# Patient Record
Sex: FEMALE | Race: WHITE | NOT HISPANIC OR LATINO | Employment: OTHER | URBAN - METROPOLITAN AREA
[De-identification: names, ages, dates, MRNs, and addresses within clinical notes are randomized per-mention and may not be internally consistent; named-entity substitution may affect disease eponyms.]

---

## 2018-02-16 ENCOUNTER — TELEPHONE (OUTPATIENT)
Dept: FAMILY MEDICINE | Facility: CLINIC | Age: 53
End: 2018-02-16

## 2018-02-16 NOTE — TELEPHONE ENCOUNTER
----- Message from Sherry Elliott sent at 2/16/2018 12:47 PM CST -----  Contact: Self  Pt is calling because she is new in the area and takes oxycontin and oxycodone and cannot wait until 2/22/18.  Pt says she needs to be seen sooner and requests Staff contact her for rescheduling.    She can be reached at [phone].    Thank you.

## 2018-02-20 ENCOUNTER — HOSPITAL ENCOUNTER (EMERGENCY)
Facility: HOSPITAL | Age: 53
Discharge: LEFT BEFORE DISCHARGE | End: 2018-02-20
Attending: EMERGENCY MEDICINE
Payer: COMMERCIAL

## 2018-02-20 ENCOUNTER — HOSPITAL ENCOUNTER (EMERGENCY)
Facility: HOSPITAL | Age: 53
Discharge: HOME | End: 2018-02-20
Attending: EMERGENCY MEDICINE
Payer: COMMERCIAL

## 2018-02-20 ENCOUNTER — TELEPHONE (OUTPATIENT)
Dept: FAMILY MEDICINE | Facility: CLINIC | Age: 53
End: 2018-02-20

## 2018-02-20 VITALS
RESPIRATION RATE: 15 BRPM | BODY MASS INDEX: 41.95 KG/M2 | OXYGEN SATURATION: 98 % | HEIGHT: 70 IN | DIASTOLIC BLOOD PRESSURE: 111 MMHG | HEART RATE: 95 BPM | SYSTOLIC BLOOD PRESSURE: 178 MMHG | TEMPERATURE: 98 F | WEIGHT: 293 LBS

## 2018-02-20 VITALS
BODY MASS INDEX: 40.09 KG/M2 | TEMPERATURE: 98 F | HEART RATE: 95 BPM | HEIGHT: 70 IN | DIASTOLIC BLOOD PRESSURE: 46 MMHG | SYSTOLIC BLOOD PRESSURE: 128 MMHG | RESPIRATION RATE: 20 BRPM | WEIGHT: 280 LBS | OXYGEN SATURATION: 98 %

## 2018-02-20 DIAGNOSIS — F11.93 OPIATE WITHDRAWAL: Primary | ICD-10-CM

## 2018-02-20 DIAGNOSIS — R11.10 VOMITING: ICD-10-CM

## 2018-02-20 DIAGNOSIS — R07.9 CHEST PAIN: ICD-10-CM

## 2018-02-20 DIAGNOSIS — F11.90 NARCOTIC DRUG USE: ICD-10-CM

## 2018-02-20 DIAGNOSIS — F41.0 ANXIETY ATTACK: Primary | ICD-10-CM

## 2018-02-20 LAB
ALBUMIN SERPL BCP-MCNC: 3.9 G/DL
ALBUMIN SERPL BCP-MCNC: 3.9 G/DL
ALP SERPL-CCNC: 122 U/L
ALP SERPL-CCNC: 127 U/L
ALT SERPL W/O P-5'-P-CCNC: 205 U/L
ALT SERPL W/O P-5'-P-CCNC: 244 U/L
ANION GAP SERPL CALC-SCNC: 11 MMOL/L
ANION GAP SERPL CALC-SCNC: 12 MMOL/L
AST SERPL-CCNC: 148 U/L
AST SERPL-CCNC: 204 U/L
BASOPHILS # BLD AUTO: 0.04 K/UL
BASOPHILS # BLD AUTO: 0.04 K/UL
BASOPHILS NFR BLD: 0.4 %
BASOPHILS NFR BLD: 0.6 %
BILIRUB SERPL-MCNC: 1 MG/DL
BILIRUB SERPL-MCNC: 1.7 MG/DL
BILIRUB UR QL STRIP: NEGATIVE
BNP SERPL-MCNC: 106 PG/ML
BUN SERPL-MCNC: 11 MG/DL
BUN SERPL-MCNC: 13 MG/DL
CALCIUM SERPL-MCNC: 10.1 MG/DL
CALCIUM SERPL-MCNC: 10.3 MG/DL
CHLORIDE SERPL-SCNC: 104 MMOL/L
CHLORIDE SERPL-SCNC: 108 MMOL/L
CLARITY UR: CLEAR
CO2 SERPL-SCNC: 19 MMOL/L
CO2 SERPL-SCNC: 20 MMOL/L
COLOR UR: YELLOW
CREAT SERPL-MCNC: 0.6 MG/DL
CREAT SERPL-MCNC: 0.7 MG/DL
DIFFERENTIAL METHOD: ABNORMAL
DIFFERENTIAL METHOD: NORMAL
EOSINOPHIL # BLD AUTO: 0.1 K/UL
EOSINOPHIL # BLD AUTO: 0.3 K/UL
EOSINOPHIL NFR BLD: 0.7 %
EOSINOPHIL NFR BLD: 4.7 %
ERYTHROCYTE [DISTWIDTH] IN BLOOD BY AUTOMATED COUNT: 14 %
ERYTHROCYTE [DISTWIDTH] IN BLOOD BY AUTOMATED COUNT: 14.5 %
EST. GFR  (AFRICAN AMERICAN): >60 ML/MIN/1.73 M^2
EST. GFR  (AFRICAN AMERICAN): >60 ML/MIN/1.73 M^2
EST. GFR  (NON AFRICAN AMERICAN): >60 ML/MIN/1.73 M^2
EST. GFR  (NON AFRICAN AMERICAN): >60 ML/MIN/1.73 M^2
GLUCOSE SERPL-MCNC: 122 MG/DL
GLUCOSE SERPL-MCNC: 157 MG/DL
GLUCOSE UR QL STRIP: NEGATIVE
HCT VFR BLD AUTO: 44.3 %
HCT VFR BLD AUTO: 46.3 %
HGB BLD-MCNC: 14.9 G/DL
HGB BLD-MCNC: 15.4 G/DL
HGB UR QL STRIP: NEGATIVE
IMM GRANULOCYTES # BLD AUTO: 0.03 K/UL
IMM GRANULOCYTES NFR BLD AUTO: 0.3 %
INR PPP: 1.3
KETONES UR QL STRIP: NEGATIVE
LEUKOCYTE ESTERASE UR QL STRIP: NEGATIVE
LIPASE SERPL-CCNC: 55 U/L
LYMPHOCYTES # BLD AUTO: 2 K/UL
LYMPHOCYTES # BLD AUTO: 4 K/UL
LYMPHOCYTES NFR BLD: 27.3 %
LYMPHOCYTES NFR BLD: 38.9 %
MAGNESIUM SERPL-MCNC: 1.6 MG/DL
MCH RBC QN AUTO: 30.9 PG
MCH RBC QN AUTO: 31.2 PG
MCHC RBC AUTO-ENTMCNC: 33.3 G/DL
MCHC RBC AUTO-ENTMCNC: 33.6 G/DL
MCV RBC AUTO: 93 FL
MCV RBC AUTO: 93 FL
MONOCYTES # BLD AUTO: 0.5 K/UL
MONOCYTES # BLD AUTO: 1 K/UL
MONOCYTES NFR BLD: 7.3 %
MONOCYTES NFR BLD: 9.9 %
NEUTROPHILS # BLD AUTO: 4.3 K/UL
NEUTROPHILS # BLD AUTO: 5.1 K/UL
NEUTROPHILS NFR BLD: 49.8 %
NEUTROPHILS NFR BLD: 60 %
NITRITE UR QL STRIP: NEGATIVE
NRBC BLD-RTO: 0 /100 WBC
PH UR STRIP: 6 [PH] (ref 5–8)
PLATELET # BLD AUTO: 137 K/UL
PLATELET # BLD AUTO: 178 K/UL
PMV BLD AUTO: 12.3 FL
PMV BLD AUTO: 12.5 FL
POTASSIUM SERPL-SCNC: 3.5 MMOL/L
POTASSIUM SERPL-SCNC: 4.8 MMOL/L
PROT SERPL-MCNC: 8.2 G/DL
PROT SERPL-MCNC: 9.2 G/DL
PROT UR QL STRIP: NEGATIVE
PROTHROMBIN TIME: 12.9 SEC
RBC # BLD AUTO: 4.77 M/UL
RBC # BLD AUTO: 4.99 M/UL
SODIUM SERPL-SCNC: 135 MMOL/L
SODIUM SERPL-SCNC: 139 MMOL/L
SP GR UR STRIP: 1.02 (ref 1–1.03)
TROPONIN I SERPL DL<=0.01 NG/ML-MCNC: 0.02 NG/ML
URN SPEC COLLECT METH UR: ABNORMAL
UROBILINOGEN UR STRIP-ACNC: ABNORMAL EU/DL
WBC # BLD AUTO: 10.2 K/UL
WBC # BLD AUTO: 7.17 K/UL

## 2018-02-20 PROCEDURE — 80053 COMPREHEN METABOLIC PANEL: CPT | Mod: 91

## 2018-02-20 PROCEDURE — 83880 ASSAY OF NATRIURETIC PEPTIDE: CPT

## 2018-02-20 PROCEDURE — 93005 ELECTROCARDIOGRAM TRACING: CPT

## 2018-02-20 PROCEDURE — 99285 EMERGENCY DEPT VISIT HI MDM: CPT | Mod: ,,, | Performed by: EMERGENCY MEDICINE

## 2018-02-20 PROCEDURE — 96375 TX/PRO/DX INJ NEW DRUG ADDON: CPT

## 2018-02-20 PROCEDURE — 83690 ASSAY OF LIPASE: CPT

## 2018-02-20 PROCEDURE — 84484 ASSAY OF TROPONIN QUANT: CPT

## 2018-02-20 PROCEDURE — 93010 ELECTROCARDIOGRAM REPORT: CPT | Mod: ,,, | Performed by: INTERNAL MEDICINE

## 2018-02-20 PROCEDURE — 63600175 PHARM REV CODE 636 W HCPCS: Performed by: EMERGENCY MEDICINE

## 2018-02-20 PROCEDURE — 85025 COMPLETE CBC W/AUTO DIFF WBC: CPT

## 2018-02-20 PROCEDURE — 81003 URINALYSIS AUTO W/O SCOPE: CPT

## 2018-02-20 PROCEDURE — 25000003 PHARM REV CODE 250: Performed by: EMERGENCY MEDICINE

## 2018-02-20 PROCEDURE — 96361 HYDRATE IV INFUSION ADD-ON: CPT

## 2018-02-20 PROCEDURE — 85610 PROTHROMBIN TIME: CPT

## 2018-02-20 PROCEDURE — 96374 THER/PROPH/DIAG INJ IV PUSH: CPT

## 2018-02-20 PROCEDURE — 80053 COMPREHEN METABOLIC PANEL: CPT

## 2018-02-20 PROCEDURE — 99285 EMERGENCY DEPT VISIT HI MDM: CPT | Mod: 25,27

## 2018-02-20 PROCEDURE — 83735 ASSAY OF MAGNESIUM: CPT

## 2018-02-20 PROCEDURE — 99284 EMERGENCY DEPT VISIT MOD MDM: CPT | Mod: 25

## 2018-02-20 PROCEDURE — 85025 COMPLETE CBC W/AUTO DIFF WBC: CPT | Mod: 91

## 2018-02-20 RX ORDER — HYDROMORPHONE HYDROCHLORIDE 2 MG/ML
1 INJECTION, SOLUTION INTRAMUSCULAR; INTRAVENOUS; SUBCUTANEOUS
Status: COMPLETED | OUTPATIENT
Start: 2018-02-20 | End: 2018-02-20

## 2018-02-20 RX ORDER — DILTIAZEM HYDROCHLORIDE 240 MG/1
240 CAPSULE, COATED, EXTENDED RELEASE ORAL
Status: DISCONTINUED | OUTPATIENT
Start: 2018-02-21 | End: 2018-02-21 | Stop reason: HOSPADM

## 2018-02-20 RX ORDER — CLONIDINE HYDROCHLORIDE 0.2 MG/1
0.2 TABLET ORAL
Status: COMPLETED | OUTPATIENT
Start: 2018-02-20 | End: 2018-02-20

## 2018-02-20 RX ORDER — METOCLOPRAMIDE HYDROCHLORIDE 5 MG/ML
10 INJECTION INTRAMUSCULAR; INTRAVENOUS
Status: COMPLETED | OUTPATIENT
Start: 2018-02-20 | End: 2018-02-20

## 2018-02-20 RX ADMIN — SODIUM CHLORIDE 1000 ML: 0.9 INJECTION, SOLUTION INTRAVENOUS at 02:02

## 2018-02-20 RX ADMIN — HYDROMORPHONE HYDROCHLORIDE 1 MG: 2 INJECTION INTRAMUSCULAR; INTRAVENOUS; SUBCUTANEOUS at 02:02

## 2018-02-20 RX ADMIN — CLONIDINE HYDROCHLORIDE 0.2 MG: 0.2 TABLET ORAL at 03:02

## 2018-02-20 RX ADMIN — METOCLOPRAMIDE 10 MG: 5 INJECTION, SOLUTION INTRAMUSCULAR; INTRAVENOUS at 02:02

## 2018-02-20 NOTE — TELEPHONE ENCOUNTER
Patient's daughter in law states she will try to get patient into [facility] but will keep appointment with Dr. Davila if she needs an referral.

## 2018-02-20 NOTE — ED NOTES
Pt. Returned to the room from x ray.  Pt. Moving around in bed. States we are not helping her pain and wants to leave and go home. Dr. Alarcon aware and states pt. Can leave AMA.

## 2018-02-20 NOTE — TELEPHONE ENCOUNTER
----- Message from Prudence Hays sent at 2/20/2018  7:50 AM CST -----  Contact: [phone]/Prema pt's daughter in law   Pt's daughter in law called stating pt was admitted in the hospital yesterday because she was having severe pain . Pt's daughter in law was told pt its addicted to pain medications and she needs help on what to do to get her off the pain medications . Please advise

## 2018-02-20 NOTE — ED PROVIDER NOTES
Encounter Date: 2/20/2018       History     Chief Complaint   Patient presents with    Emesis     pt. reports vomiting since this evening and generalized body pain all over. pt. just moved here from [facility]. and is out of oxycodone. pt. has been on oxycodone 30mg six times for years.      Patient recently moved from [place] to [place] after the death of her . She is leaving with her sister in law. She said she has been suffering from chronic pain syndrome for years.  She also said she has been taking 200 mg of oxycodone which she gets from a provider in [place].  She recently ran out of her opiate medication, she wants it to be refilled in the ED.  I explained to her that it cannot be done in the emergency room that she would need to see a pain specialist to get that kind of narcotic prescription.  Patient begged me to give a dose of Dilaudid in the ED which I obliged one time.          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 Arroyo at [place]  [facility]    TONSILLECTOMY      TOTAL KNEE ARTHROPLASTY Bilateral right 6/2014, left 9/30/2014    TOTAL KNEE ARTHROPLASTY Left 7/21/2015    revision     Family History   Problem Relation Age of Onset    Bladder Cancer Father     Heart disease Father      heart attack    Diabetes Father     Hypertension Father     Pulmonary embolism Mother      Social History   Substance Use Topics    Smoking status: Never Smoker    Smokeless tobacco: Never Used    Alcohol use 0.0 oz/week      Comment: occassionally, 2 beers for football games     Review of Systems   Constitutional: Negative.  Negative for chills and fatigue.   HENT: Negative for congestion, ear pain and sneezing.    Eyes: Negative.    Respiratory: Negative for apnea, chest tightness, shortness of breath and wheezing.    Cardiovascular: Negative for chest pain and leg swelling.   Gastrointestinal: Positive for nausea and vomiting. Negative for abdominal distention, abdominal pain, blood in stool and diarrhea.   Endocrine: Negative.    Genitourinary: Negative for difficulty urinating, dysuria, flank pain, frequency and urgency.   Musculoskeletal: Positive for myalgias.   Skin: Negative for color change and pallor.   Allergic/Immunologic: Negative.    Neurological: Negative for dizziness, seizures and headaches.   Hematological: Negative.    Psychiatric/Behavioral: Negative for agitation, behavioral problems and suicidal ideas.   All other systems reviewed and are negative.      Physical Exam     Initial Vitals [02/20/18 0108]   BP Pulse Resp Temp SpO2   (!) 217/115 92 20 98.1 °F (36.7 °C) 97 %      MAP       149         Physical Exam    Nursing note and vitals reviewed.  Constitutional: She appears well-developed and well-nourished.   HENT:   Head: Normocephalic and atraumatic.   Eyes: Conjunctivae and EOM are normal. Pupils are equal, round, and reactive to light.   Neck: Normal range of motion. Neck supple.   Cardiovascular: Normal rate, regular  rhythm, normal heart sounds and intact distal pulses.   Pulmonary/Chest: Breath sounds normal.   Abdominal: Soft. Bowel sounds are normal. She exhibits no distension and no mass. There is no tenderness. There is no rebound and no guarding.   Musculoskeletal: Normal range of motion.   Neurological: She is alert and oriented to person, place, and time. She has normal strength.   Skin: Skin is warm and dry. Capillary refill takes less than 2 seconds.   Psychiatric: She has a normal mood and affect. Her behavior is normal. Judgment and thought content normal.         ED Course   Procedures  Labs Reviewed   CBC W/ AUTO DIFFERENTIAL - Abnormal; Notable for the following:        Result Value    MCH 31.2 (*)     Platelets 137 (*)     All other components within normal limits   COMPREHENSIVE METABOLIC PANEL - Abnormal; Notable for the following:     CO2 20 (*)     Glucose 157 (*)      (*)      (*)     All other components within normal limits   URINALYSIS - Abnormal; Notable for the following:     Urobilinogen, UA 4.0-6.0 (*)     All other components within normal limits   MAGNESIUM   LIPASE   POCT URINE PREGNANCY        Imaging Results          X-Ray Abdomen AP 1 View (KUB) (Final result)  Result time 02/20/18 04:04:02    Final result by Ren Giron MD (02/20/18 04:04:02)                 Impression:             Unremarkable bowel gas pattern.      Electronically signed by: REN GIRON MD  Date:     02/20/18  Time:    04:04              Narrative:    Abdomen, single view.    Indication:.    Findings:    Cholecystectomy clips are upper quadrant.    The bowel gas pattern is within normal limits.                                 Medical Decision Making:   Differential Diagnosis:   Opiate Drug Withdrawa.  Vomiting.  ED Management:  Patient wanted more narcotics in the ED.  She also want to be prescribed 200 mg of oxycodone to go home.  I told her that it is not possible to give her that amount of narcotics  to go home.  I also told her that she needs to see with a pain management specialist who can assess pain and decide on how to manage chronic pain.  Patient then got upset and decided to leave AGAINST MEDICAL ADVICE.  She is alert and oriented ×4 she has medical decision-making capacity.  She signed left AGAINST MEDICAL ADVICE with her sister-in-law.                      Clinical Impression:   The primary encounter diagnosis was Opiate withdrawal. Diagnoses of Vomiting and Narcotic drug use were also pertinent to this visit.    Disposition:   Disposition: AMA  Condition: Stable                        Rachel Alarcon MD  02/20/18 0412

## 2018-02-20 NOTE — ED TRIAGE NOTES
Pt. To the ER with c/o body aches and vomiting after opiate withdrawal. Pt. Placed on cardiac, BP and pulse oximetry monitoring. Pt. Is moving around in bed and instructed to stay in bed. Daughter in law is at the bedside. Side rails elevated x 2 and the bed is in the low position. Pt. Is vomiting yellow bile.

## 2018-02-21 NOTE — ED PROVIDER NOTES
Encounter Date: 2/20/2018    SCRIBE #1 NOTE: I, Todd Montes, am scribing for, and in the presence of,  Dr. Davis NORRIS I have scribed the following portions of the note - Other sections scribed: Partial MDM.       History     Chief Complaint   Patient presents with    Chest Pain     Pt reports CP since being admitted into [facility] - being sent for medical clearance.      Patient is a 52 year old female who presents after having chest pain at a detox facility right as she was enrolling in the program. Patient is accompanied by her son. Patient is reportedly having opiate dependence issues stating she takes oxy for leg pain ever since she had knee replacement years ago. She just recently left [facility] last night after physician would not give her pain medication. She states this situation prompted her to get help. Her and her son were at the detox facility [facility] when patient started complaining of chest pain so the facility required patient to be cleared at hospital before enrolling. Patient states it was just a panic attack which she states she has had similar episodes previously. Describes the chest pain as substernal, no radiation, not associated with exertion. States she does not have CP currently. Admits she should not have called out chest pain saying it was a stupid decision. Denies fever, chills, nausea, vomiting, dysuria, frequency.          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 at [facility]    TONSILLECTOMY      TOTAL KNEE ARTHROPLASTY Bilateral right 6/2014, left 9/30/2014    TOTAL KNEE ARTHROPLASTY Left 7/21/2015    revision     Family History   Problem Relation Age of Onset    Bladder Cancer Father     Heart disease Father      heart attack    Diabetes Father     Hypertension Father     Pulmonary embolism Mother      Social History   Substance Use Topics    Smoking status: Never Smoker    Smokeless tobacco: Never Used    Alcohol use 0.0 oz/week      Comment: occassionally, 2 beers for football games     Review of Systems   Constitutional: Negative for activity change, chills and fever.   HENT: Negative for congestion and sore throat.    Eyes: Negative.    Respiratory: Negative for cough and shortness of breath.    Cardiovascular: Negative for chest pain and leg swelling.   Gastrointestinal: Negative for constipation and diarrhea.   Endocrine: Negative.    Genitourinary: Negative for difficulty urinating, dysuria and frequency.   Musculoskeletal: Negative.        Physical Exam     Initial Vitals   BP Pulse Resp Temp SpO2   02/20/18 2104 02/20/18 2104 02/20/18 2104 02/20/18 2104 02/20/18 2233   (!) 136/90 107 18 97.6 °F (36.4 °C) 99 %      MAP       02/20/18 2104       105.33         Physical Exam    Constitutional: She appears well-developed and well-nourished.   HENT:   Head: Normocephalic and atraumatic.   Eyes: EOM are normal.   Neck: Normal range of motion.   Cardiovascular: Normal rate and regular rhythm. Exam  reveals no gallop and no friction rub.    Pulmonary/Chest: No respiratory distress. She has no wheezes. She has no rales.   Abdominal: Soft. There is no tenderness.   Neurological: She is alert and oriented to person, place, and time.         ED Course   Procedures  Labs Reviewed   PROTIME-INR - Abnormal; Notable for the following:        Result Value    Prothrombin Time 12.9 (*)     INR 1.3 (*)     All other components within normal limits   CBC W/ AUTO DIFFERENTIAL   TROPONIN I   COMPREHENSIVE METABOLIC PANEL   B-TYPE NATRIURETIC PEPTIDE             Medical Decision Making:   History:   Old Medical Records: I decided to obtain old medical records.  Differential Diagnosis:   Ddx includes malingering, MI, aortic dissection, GERD, costochondritis.  Clinical Tests:   Lab Tests: Ordered and Reviewed  Radiological Study: Ordered and Reviewed  Medical Tests: Ordered and Reviewed       APC / Resident Notes:   Patient is a 53 yo F who presents after having chest pain while enrolling at detox facility. CBC, CMP unrevealing. CXR with no acute processes. Troponin flat. Patient discharged in stable condition. Counseled on opiate addiction.       Scribe Attestation:   Scribe #1: I performed the above scribed service and the documentation accurately describes the services I performed. I attest to the accuracy of the note.               Clinical Impression:   The primary encounter diagnosis was Anxiety attack. A diagnosis of Chest pain was also pertinent to this visit.    Disposition:   Disposition: Discharged  Condition: Stable                        Jules Cannon MD  02/21/18 0595

## 2018-02-21 NOTE — ED NOTES
Pain:pt complains of a lot of pain    Psychosocial: Patient is calm and cooperative. Patients insight and judgement are appropriate to situation. Appears clean, well maintained, with clothing appropriate to environment. No evidence of delusions, hallucinations, or psychosis.    Neuro: Eyes open spontaneously. Awake, alert, oriented x 4. Speech clear and appropriate. Tolerating saliva secretions well. Able to follow commands, demonstrating ability to actively and appropriately communicate within context of current conversation. Symmetrical facial muscles. Moving all extremities well with no noted weakness. Adequate muscle tone present. Movement is purposeful. No evidence of impaired sensation. Responds to external stimuli with appropriate reflexes.     Airway: Bilateral chest rise and fall. RR regular and non-labored. Air entry patent and clear x 5 lobes of the lungs. No crepitus or subcutaneous emphysema noted on palpation.     Circulatory: Skin warm, dry, and pink. Apical and radial pulses strong and regular. Capillary refill/skin blanching less than 3 seconds to distal of 4 extremities. Placed on CM in NSR without ectopy.    Abdomen: Abdomen obese, soft and non-distended. Positive normo-active bowel sounds x 4 quadrants.     Urinary: Patient reports routine urination without pain, frequency, or urgency. Voids independently. Reports urine appears rodney/yellow in color.    Extremities: No redness, heat, swelling, deformity, or pain.     Skin: Intact with no bruising/discolorations noted.

## 2018-02-21 NOTE — ED NOTES
Pt is resting comfortably in stretcher with eyes open. Pt is AAOx3.  Respirations are full, even, and non labored. NAD noted. Pt denies pain at this time. No needs verbalized at this time. Call bell is in reach, side rails are up x 2, and bed is in lowest, locked, position. Will continue to monitor.

## 2018-02-21 NOTE — ED TRIAGE NOTES
52 year old pt presents to the ed with complaints of chest pain. Pt is a transfer from [facility] for medical evaluation. Pt denies any sob or nausea. Pt is awake alert and oriented x3. Pt denies any si or hi at the moment.

## 2018-02-23 DIAGNOSIS — R11.10 VOMITING: Primary | ICD-10-CM

## 2018-02-26 ENCOUNTER — OFFICE VISIT (OUTPATIENT)
Dept: FAMILY MEDICINE | Facility: CLINIC | Age: 53
End: 2018-02-26
Payer: COMMERCIAL

## 2018-02-26 VITALS
RESPIRATION RATE: 18 BRPM | OXYGEN SATURATION: 97 % | WEIGHT: 293 LBS | HEART RATE: 80 BPM | HEIGHT: 70 IN | DIASTOLIC BLOOD PRESSURE: 82 MMHG | BODY MASS INDEX: 41.95 KG/M2 | SYSTOLIC BLOOD PRESSURE: 131 MMHG | TEMPERATURE: 98 F

## 2018-02-26 DIAGNOSIS — Z00.00 ANNUAL PHYSICAL EXAM: Primary | ICD-10-CM

## 2018-02-26 DIAGNOSIS — T84.84XD PAINFUL TOTAL KNEE REPLACEMENT, SUBSEQUENT ENCOUNTER: ICD-10-CM

## 2018-02-26 DIAGNOSIS — G89.4 CHRONIC PAIN DISORDER: ICD-10-CM

## 2018-02-26 DIAGNOSIS — Z96.659 PAINFUL TOTAL KNEE REPLACEMENT, SUBSEQUENT ENCOUNTER: ICD-10-CM

## 2018-02-26 PROCEDURE — 90471 IMMUNIZATION ADMIN: CPT | Mod: S$GLB,,, | Performed by: FAMILY MEDICINE

## 2018-02-26 PROCEDURE — 3008F BODY MASS INDEX DOCD: CPT | Mod: S$GLB,,, | Performed by: FAMILY MEDICINE

## 2018-02-26 PROCEDURE — 90715 TDAP VACCINE 7 YRS/> IM: CPT | Mod: S$GLB,,, | Performed by: FAMILY MEDICINE

## 2018-02-26 PROCEDURE — 99396 PREV VISIT EST AGE 40-64: CPT | Mod: 25,S$GLB,, | Performed by: FAMILY MEDICINE

## 2018-02-26 PROCEDURE — 99999 PR PBB SHADOW E&M-EST. PATIENT-LVL V: CPT | Mod: PBBFAC,,, | Performed by: FAMILY MEDICINE

## 2018-02-27 NOTE — PROGRESS NOTES
HPI:  Danika Voss is a 52 y.o. year old female that  Presents to become established. She has recently been discharged from rehab after being in for 5 days detoxing from oxycodine addiction. She states that she did not tolerate suboxone. She has severe left knee pain and has recently moved here to live with her son , who is a . She has had 3 knee replacements on the right knee , per the patient. She was being prescribed over 100 tablets of oxycodone per month and when she moved her she was no longer able to get them and went through withdrawals.   Chief Complaint   Patient presents with    Knee Pain    Establish Care   .     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    MRSA, Peptostreptococcus    NSAID induced gastritis 9/8/2015    Obesity     Parkinson disease 11/17/2015     Social History     Social History    Marital status:      Spouse name: N/A    Number of children: 3    Years of education: N/A     Occupational History    Homemaker      Social History Main Topics    Smoking status: Never Smoker    Smokeless tobacco: Never Used    Alcohol use 0.0 oz/week      Comment: occassionally, 2 beers for football games    Drug use: No    Sexual activity: Not on file     Other Topics Concern    Not on file     Social History Narrative    Living with son and his family here in [facility]. Recently  (06/17)     Past Surgical History:   Procedure Laterality Date    ABCESS DRAINAGE Left 8/6/2015    left knee washout    CARDIAC SURGERY  4/7/2010    artery on wrong side, performed in [place]    CHOLECYSTECTOMY  3/2010    HYSTERECTOMY      No cervix    PERIPHERALLY INSERTED CENTRAL CATHETER INSERTION Right 8/6/2015     "shoulder cyst removal Left 2014    performed by [facility] at [facility]    TONSILLECTOMY      TOTAL KNEE ARTHROPLASTY Bilateral right 6/2014, left 9/30/2014    TOTAL KNEE ARTHROPLASTY Left 7/21/2015    revision     Family History   Problem Relation Age of Onset    Bladder Cancer Father     Heart disease Father      heart attack    Diabetes Father     Hypertension Father     Pulmonary embolism Mother            Review of Systems  General ROS: negative for chills, fever or weight loss  Psychological ROS: negative for hallucination, depression or suicidal ideation  Ophthalmic ROS: negative for blurry vision, photophobia or eye pain  ENT ROS: negative for epistaxis, sore throat or rhinorrhea  Respiratory ROS: no cough, shortness of breath, or wheezing  Cardiovascular ROS: no chest pain or dyspnea on exertion  Gastrointestinal ROS: no abdominal pain, change in bowel habits, or black/ bloody stools  Genito-Urinary ROS: no dysuria, trouble voiding, or hematuria  Musculoskeletal ROS: pos for gait disturbance , lreft knee and lower leg pain  Neurological ROS: no syncope or seizures; no ataxia  Dermatological ROS: negative for pruritis, rash and jaundice      Physical Exam:  /82   Pulse 80   Temp 98.1 °F (36.7 °C)   Resp 18   Ht 5' 10" (1.778 m)   Wt 133.8 kg (295 lb)   SpO2 97%   BMI 42.33 kg/m²   General appearance: alert, cooperative, no distress  Constitutional:Oriented to person, place, and time.appears well-developed and well-nourished.  HEENT: Normocephalic, atraumatic, neck symmetrical, no nasal discharge, TM - clear bilaterally   Eyes: conjunctivae/corneas clear, PERRL, EOM's intact  Lungs: clear to auscultation bilaterally, no dullness to percussion bilaterally  Heart: regular rate and rhythm without rub; no displacement of the PMI   Abdomen: soft, non-tender; bowel sounds normoactive; no organomegaly  Extremities:left knee incision scar visible; no clubbing, cyanosis, or edema, " pos pain to light palpation of the left knee  Integument: Skin color, texture, turgor normal; no rashes; hair distrubution normal  Neurologic: Alert and oriented X 3, normal strength, normal coordination and gait  Psychiatric: no pressured speech; normal affect; no evidence of impaired cognition   Physical Exam  LABS:    Complete Blood Count  Lab Results   Component Value Date    RBC 4.99 02/20/2018    HGB 15.4 02/20/2018    HCT 46.3 02/20/2018    MCV 93 02/20/2018    MCH 30.9 02/20/2018    MCHC 33.3 02/20/2018    RDW 14.5 02/20/2018     02/20/2018    MPV 12.5 02/20/2018    GRAN 5.1 02/20/2018    GRAN 49.8 02/20/2018    LYMPH 4.0 02/20/2018    LYMPH 38.9 02/20/2018    MONO 1.0 02/20/2018    MONO 9.9 02/20/2018    EOS 0.1 02/20/2018    BASO 0.04 02/20/2018    EOSINOPHIL 0.7 02/20/2018    BASOPHIL 0.4 02/20/2018    DIFFMETHOD Automated 02/20/2018       Comprehensive Metabolic Panel  Lab Results   Component Value Date     (H) 02/20/2018    BUN 13 02/20/2018    CREATININE 0.7 02/20/2018     (L) 02/20/2018    K 4.8 02/20/2018     02/20/2018    PROT 9.2 (H) 02/20/2018    ALBUMIN 3.9 02/20/2018    BILITOT 1.7 (H) 02/20/2018     (H) 02/20/2018    ALKPHOS 122 02/20/2018    CO2 19 (L) 02/20/2018     (H) 02/20/2018    ANIONGAP 12 02/20/2018    EGFRNONAA >60.0 02/20/2018    ESTGFRAFRICA >60.0 02/20/2018       LIPID  Lab Results   Component Value Date    CHOL 105 (L) 01/16/2016    HDL 25 01/16/2016       TSH  Lab Results   Component Value Date    TSH 6.360 (H) 01/18/2016       Current Outpatient Prescriptions   Medication Sig Dispense Refill    diltiazem (CARDIZEM CD) 240 MG 24 hr capsule Take 1 capsule (240 mg total) by mouth once daily. 30 capsule 11    levothyroxine (SYNTHROID) 25 MCG tablet Take 1 tablet (25 mcg total) by mouth once daily. 10 tablet 0    NALOXEGOL OXALATE (NALOXEGOL ORAL) Take by mouth.      venlafaxine (EFFEXOR-XR) 150 MG Cp24 Take 1 capsule (150 mg total) by  mouth once daily. 10 capsule 0    losartan (COZAAR) 100 MG tablet Take 1 tablet (100 mg total) by mouth once daily. 30 tablet 11    propranolol (INDERAL LA) 60 MG 24 hr capsule Take 1 capsule (60 mg total) by mouth once daily. 10 capsule 0     No current facility-administered medications for this visit.        Assessment:    ICD-10-CM ICD-9-CM    1. Annual physical exam Z00.00 V70.0 Lipid panel      TSH      Mammo Digital Screening Bilateral With CAD      Tdap Vaccine   2. Chronic pain disorder G89.4 338.4 Ambulatory Referral to Pain Clinic   3. Painful total knee replacement, subsequent encounter T84.84XD V58.89 Ambulatory Referral to Orthopedics    Z96.659 996.77 Ambulatory Referral to Pain Clinic     338.18      V43.65          Plan:    Follow-up in 4 weeks (on 3/26/2018).          Marge Davila MD

## 2018-03-02 ENCOUNTER — OFFICE VISIT (OUTPATIENT)
Dept: PAIN MEDICINE | Facility: CLINIC | Age: 53
End: 2018-03-02
Payer: MEDICAID

## 2018-03-02 VITALS
WEIGHT: 293 LBS | HEART RATE: 87 BPM | HEIGHT: 70 IN | RESPIRATION RATE: 20 BRPM | SYSTOLIC BLOOD PRESSURE: 149 MMHG | DIASTOLIC BLOOD PRESSURE: 97 MMHG | BODY MASS INDEX: 41.95 KG/M2

## 2018-03-02 DIAGNOSIS — Z96.659 PAIN DUE TO TOTAL KNEE REPLACEMENT, UNSPECIFIED LATERALITY, SUBSEQUENT ENCOUNTER: Primary | Chronic | ICD-10-CM

## 2018-03-02 DIAGNOSIS — Z12.11 COLON CANCER SCREENING: ICD-10-CM

## 2018-03-02 DIAGNOSIS — F33.1 MAJOR DEPRESSIVE DISORDER, RECURRENT EPISODE, MODERATE: Chronic | ICD-10-CM

## 2018-03-02 DIAGNOSIS — T84.84XD PAIN DUE TO TOTAL KNEE REPLACEMENT, UNSPECIFIED LATERALITY, SUBSEQUENT ENCOUNTER: Primary | Chronic | ICD-10-CM

## 2018-03-02 DIAGNOSIS — E66.01 MORBID OBESITY WITH BMI OF 40.0-44.9, ADULT: Chronic | ICD-10-CM

## 2018-03-02 DIAGNOSIS — G89.4 CHRONIC PAIN DISORDER: Chronic | ICD-10-CM

## 2018-03-02 PROCEDURE — 99999 PR PBB SHADOW E&M-EST. PATIENT-LVL III: CPT | Mod: PBBFAC,,, | Performed by: PAIN MEDICINE

## 2018-03-02 PROCEDURE — 99213 OFFICE O/P EST LOW 20 MIN: CPT | Mod: PBBFAC,PO | Performed by: PAIN MEDICINE

## 2018-03-02 PROCEDURE — 99204 OFFICE O/P NEW MOD 45 MIN: CPT | Mod: S$PBB,,, | Performed by: PAIN MEDICINE

## 2018-03-02 RX ORDER — BUPRENORPHINE HYDROCHLORIDE AND NALOXONE HYDROCHLORIDE DIHYDRATE 2; .5 MG/1; MG/1
TABLET SUBLINGUAL EVERY 6 HOURS PRN
COMMUNITY
End: 2018-05-01

## 2018-03-02 RX ORDER — FLECAINIDE ACETATE 50 MG/1
100 TABLET ORAL 2 TIMES DAILY
COMMUNITY
End: 2020-07-27

## 2018-03-02 NOTE — LETTER
March 2, 2018      Marge Davila MD  [address]  [address]  [place] [address]           [facility]  [address]  [facility] [address]  Phone: [phone]          Patient: Danika Voss   MR Number: [id number]   YOB: 1965   Date of Visit: 3/2/2018       Dear Dr. Marge Davila:    Thank you for referring Danika Voss to me for evaluation. Attached you will find relevant portions of my assessment and plan of care.    If you have questions, please do not hesitate to call me. I look forward to following Danika Voss along with you.    Sincerely,    Nay Brown Jr., MD    Enclosure  CC:  No Recipients    If you would like to receive this communication electronically, please contact [email or web address] or [phone] to request more information on [employer] access.    For providers and/or their staff who would like to refer a patient to [facility], please contact us through our one-stop-shop provider referral line, [facility], at [phone].    If you feel you have received this communication in error or would no longer like to receive these types of communications, please e-mail [email or web address]

## 2018-03-02 NOTE — PROGRESS NOTES
[facility] New Patient Evaluation    Referred by: Marge Davila   Reason for referral:     CC:   Chief Complaint   Patient presents with    Back Pain    Knee Pain     left     HPI: Danika Voss is a 52 y.o. female who complains of back and left knee pain as characterized below.    Location: mid back and left knee  Severity: Currently: 7/10   Typical Range: 6/10     Exacerbation: 10/10   Onset: 3 to 4 years ago following second knee replacement   Quality: Aching  Radiation: stays in left knee  Axial/Extremity Percentage of Pain: NA  Exacerbating Factors: standing and walking for more than 5 minutes  Mitigating Factors: rest and sitting  Assoc: denies night fever/night sweats, urinary incontinence, bowel incontinence, significant weight loss, significant motor weakness and loss of sensations    Previous Therapies:  PT: Completed after each surgery on knee in 2015 and 2016  HEP:  Intermittently performing  TENS:  Injections: Synvisc and Cortisone injections   Surgery:    - Left knee replacement in 2015   - Left knee revision in 2016  Medications:   - NSAIDS: avoids due to ulcers  - MSK Relaxants: robaxin helped  - TCAs:   - SNRIs: Current  - Topicals: Voltaren didn't help  - Anticonvulsants: Gabapentin & Lyrica didn't help  - Opioids: Previously, but recently completed rehab to wean off of Oxycodone 30 q4    Current Pain Medications:  1. Velanfaxine 150 daily  2. Suboxone - currently reporting lots of side effects    Full Medication List:    Current Outpatient Prescriptions:     buprenorphine-naloxone 2-0.5 mg (SUBOXONE) 2-0.5 mg Subl, Place under the tongue every 6 (six) hours as needed., Disp: , Rfl:     diltiazem (CARDIZEM CD) 240 MG 24 hr capsule, Take 1 capsule (240 mg total) by mouth once daily., Disp: 30 capsule, Rfl: 11    flecainide (TAMBOCOR) 50 MG Tab, Take 100 mg by mouth 2 (two) times daily., Disp: , Rfl:     levothyroxine (SYNTHROID) 25 MCG tablet, Take 1 tablet (25 mcg total) by mouth  once daily., Disp: 10 tablet, Rfl: 0    venlafaxine (EFFEXOR-XR) 150 MG Cp24, Take 1 capsule (150 mg total) by mouth once daily., Disp: 10 capsule, Rfl: 0    losartan (COZAAR) 100 MG tablet, Take 1 tablet (100 mg total) by mouth once daily., Disp: 30 tablet, Rfl: 11    NALOXEGOL OXALATE (NALOXEGOL ORAL), Take by mouth., Disp: , Rfl:     propranolol (INDERAL LA) 60 MG 24 hr capsule, Take 1 capsule (60 mg total) by mouth once daily., Disp: 10 capsule, Rfl: 0     Review of Systems:  Review of Systems   Constitutional: Negative for chills and fever.   HENT: Negative for nosebleeds.    Eyes: Negative for pain.   Respiratory: Negative for hemoptysis.    Cardiovascular: Negative for chest pain.   Gastrointestinal: Negative for nausea and vomiting.   Genitourinary: Negative for dysuria.   Skin: Negative for rash.       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    TONSILLECTOMY      TOTAL KNEE ARTHROPLASTY Bilateral right 6/2014, left 9/30/2014    TOTAL KNEE ARTHROPLASTY Left 7/21/2015    revision        Social  "History:  Social History     Social History    Marital status:      Spouse name: N/A    Number of children: 3    Years of education: N/A     Occupational History    Homemaker      Social History Main Topics    Smoking status: Never Smoker    Smokeless tobacco: Never Used    Alcohol use 0.0 oz/week      Comment: occassionally, 2 beers for football games    Drug use: No    Sexual activity: Not on file     Other Topics Concern    Not on file     Social History Narrative    Living with son and his family here in [facility]. Recently  (06/17)       Physical Exam:  Vitals:    03/02/18 1510   BP: (!) 149/97   Pulse: 87   Resp: 20   Weight: (!) 138.6 kg (305 lb 8.9 oz)   Height: 5' 10" (1.778 m)   PainSc:   7     General    Nursing note and vitals reviewed.  Constitutional: She is oriented to person, place, and time. She appears well-developed and well-nourished. No distress.   HENT:   Head: Normocephalic and atraumatic.   Nose: Nose normal.   Eyes: Conjunctivae and EOM are normal. Pupils are equal, round, and reactive to light. Right eye exhibits no discharge. Left eye exhibits no discharge. No scleral icterus.   Neck: No JVD present.   Cardiovascular: Intact distal pulses.    Pulmonary/Chest: Effort normal. No respiratory distress.   Abdominal: She exhibits no distension.   Neurological: She is alert and oriented to person, place, and time. Coordination normal.   Psychiatric: Judgment and thought content normal.   Labile mood.  Easily tearful.     General Musculoskeletal Exam   Gait: abnormal and antalgic       Right Knee Exam   Right knee exam is normal.    Left Knee Exam     Inspection   Scars: present  Swelling: absent  Effusion: absent    Tenderness   The patient tender to palpation of the lateral joint line, medial retinaculum, patella, patellar tendon and pes anserinus.    Range of Motion   The patient has normal left knee ROM.      Imaging:X-Ray Knee 3 View Left  Three views obtained. "  Comparison December 2015.  Once again there are surgical changes of total knee arthroplasty.  Hardware is intact and there has not been a significant change when compared to the prior study.  No evidence of fracture.  There is edema within the anterior soft tissues.   Impression      As above       Electronically signed by: Harmony White MD  Date: 02/01/16  Time: 11:46          Labs:  BMP  Lab Results   Component Value Date     (L) 02/20/2018    K 4.8 02/20/2018     02/20/2018    CO2 19 (L) 02/20/2018    BUN 13 02/20/2018    CREATININE 0.7 02/20/2018    CALCIUM 10.3 02/20/2018    ANIONGAP 12 02/20/2018    ESTGFRAFRICA >60.0 02/20/2018    EGFRNONAA >60.0 02/20/2018     Lab Results   Component Value Date     (H) 02/20/2018     (H) 02/20/2018    ALKPHOS 122 02/20/2018    BILITOT 1.7 (H) 02/20/2018       Assessment:  Problem List Items Addressed This Visit     Morbid obesity with BMI of 40.0-44.9, adult (Chronic)    Major depressive disorder, recurrent episode, moderate (Chronic)    Painful total knee replacement - Primary (Chronic)    Chronic pain disorder (Chronic)          Miss Voss is a 52 female with chronic left knee pain status post left TKA in 2015 and TKA revision in 2016 without relief.  She is scheduled for orthopedic surgery evaluation in the near future.  She was previously on high-dose opioid therapy for treatment of this pain but recently completed an inpatient rehabilitation program and is currently on Suboxone.  Toward treating her pain, I have recommended a left genicular nerve block and ablation.  The inpatient facility handling her opioid weaning did not provide her with outpatient follow-up.  I strongly recommended that she seek a provider who writes for Suboxone to help her wean off of this medication, which she stated multiple times as a goal.    Treatment Plan:   PT/OT/HEP: I will defer the need for additional PT to orthopedic surgery  Procedures: Left Genicular block  and RFA.  She may be a candidate for DRG stim trial.  Medications: None recommended at this time.  She has tried numerous medications and would be best served by intervention at this point.  Imaging: The need for additional imaging is deferred to ortho.    Follow Up: RTC p natalia Brown Jr, MD  Interventional Pain Medicine / Anesthesiology    Disclaimer: This note was partly generated using dictation software which may occasionally result in transcription errors.

## 2018-03-05 ENCOUNTER — TELEPHONE (OUTPATIENT)
Dept: FAMILY MEDICINE | Facility: CLINIC | Age: 53
End: 2018-03-05

## 2018-03-05 NOTE — TELEPHONE ENCOUNTER
----- Message from Prudence Hays sent at 3/5/2018  1:50 PM CST -----  Contact: [phone]/self  Pt called stating she is having a lot of chest rattling and wheezing and its staring to get painful . Pt want to know what to do . Please advise

## 2018-03-05 NOTE — TELEPHONE ENCOUNTER
Patient wanted to know of any expectorants she can get from OTC, informed her she can take Mucinex or Robitussin. Informed patient to call back if anything changes.

## 2018-03-21 ENCOUNTER — TELEPHONE (OUTPATIENT)
Dept: PAIN MEDICINE | Facility: CLINIC | Age: 53
End: 2018-03-21

## 2018-03-21 NOTE — TELEPHONE ENCOUNTER
----- Message from Tamy Barrientos sent at 3/21/2018  3:26 PM CDT -----  Contact: self / [phone]  Patient is requesting a call back regarding, she said she received a letter from her insurance company saying her procedure will not be covered on tomorrow. Please advise

## 2018-03-21 NOTE — TELEPHONE ENCOUNTER
Spoke with patient regarding procedure scheduled for 3/22/18. Patient stated that she received a letter stating the procedure is denied by the insurance. Patient was informed that the procedure was canceled. Patient stated that she would like to come in to see what else she can do for her pain. Patient verbalized and confirmed appt date and time on 3/26/18 at 145 PM.

## 2018-03-26 ENCOUNTER — TELEPHONE (OUTPATIENT)
Dept: FAMILY MEDICINE | Facility: CLINIC | Age: 53
End: 2018-03-26

## 2018-03-26 NOTE — TELEPHONE ENCOUNTER
Patient would like to know if there is any pain management that takes Medicaid. Informed patient I will give her a phone call back when I receive information.

## 2018-03-26 NOTE — TELEPHONE ENCOUNTER
----- Message from Denise Pizarro sent at 3/26/2018 11:28 AM CDT -----  Contact: self, [phone]  Patient requests to be seen sooner than the next available appointment on 4/24. Please advise.

## 2018-03-27 ENCOUNTER — TELEPHONE (OUTPATIENT)
Dept: FAMILY MEDICINE | Facility: CLINIC | Age: 53
End: 2018-03-27

## 2018-03-27 NOTE — TELEPHONE ENCOUNTER
----- Message from Prudence Hays sent at 3/27/2018  2:16 PM CDT -----  Contact: [phone]/ self   Pt requesting to speak with you in regarding a personal and important question  . Please advise

## 2018-04-02 ENCOUNTER — TELEPHONE (OUTPATIENT)
Dept: PAIN MEDICINE | Facility: CLINIC | Age: 53
End: 2018-04-02

## 2018-04-02 NOTE — TELEPHONE ENCOUNTER
Spoke with patient regarding questions about appt. Patient stated that she had BCBS when seeing the doctor but she now has Medicaid and would like to know if the office takes Medicaid. Patient was informed that Medicaid does not pay for Pain Management procedures. Patient verbalized understanding.

## 2018-04-02 NOTE — TELEPHONE ENCOUNTER
----- Message from Prudence Hays sent at 4/2/2018  2:34 PM CDT -----  Contact: [phone]/ self   Pt requesting to speak with you in regarding an important question . Please advise

## 2018-05-01 ENCOUNTER — OFFICE VISIT (OUTPATIENT)
Dept: FAMILY MEDICINE | Facility: CLINIC | Age: 53
End: 2018-05-01
Payer: MEDICAID

## 2018-05-01 ENCOUNTER — TELEPHONE (OUTPATIENT)
Dept: FAMILY MEDICINE | Facility: CLINIC | Age: 53
End: 2018-05-01

## 2018-05-01 ENCOUNTER — TELEPHONE (OUTPATIENT)
Dept: ORTHOPEDICS | Facility: CLINIC | Age: 53
End: 2018-05-01

## 2018-05-01 VITALS
SYSTOLIC BLOOD PRESSURE: 128 MMHG | OXYGEN SATURATION: 98 % | HEIGHT: 70 IN | HEART RATE: 97 BPM | TEMPERATURE: 98 F | WEIGHT: 293 LBS | BODY MASS INDEX: 41.95 KG/M2 | RESPIRATION RATE: 20 BRPM | DIASTOLIC BLOOD PRESSURE: 78 MMHG

## 2018-05-01 DIAGNOSIS — G47.00 INSOMNIA, UNSPECIFIED TYPE: Primary | ICD-10-CM

## 2018-05-01 DIAGNOSIS — I10 ESSENTIAL HYPERTENSION: Chronic | ICD-10-CM

## 2018-05-01 DIAGNOSIS — E03.9 ACQUIRED HYPOTHYROIDISM: ICD-10-CM

## 2018-05-01 PROCEDURE — 99999 PR PBB SHADOW E&M-EST. PATIENT-LVL III: CPT | Mod: PBBFAC,,, | Performed by: FAMILY MEDICINE

## 2018-05-01 PROCEDURE — 99213 OFFICE O/P EST LOW 20 MIN: CPT | Mod: PBBFAC,PN | Performed by: FAMILY MEDICINE

## 2018-05-01 PROCEDURE — 99214 OFFICE O/P EST MOD 30 MIN: CPT | Mod: S$PBB,,, | Performed by: FAMILY MEDICINE

## 2018-05-01 RX ORDER — TRAZODONE HYDROCHLORIDE 50 MG/1
50 TABLET ORAL NIGHTLY
Qty: 90 TABLET | Refills: 2 | Status: SHIPPED | OUTPATIENT
Start: 2018-05-01 | End: 2018-05-15

## 2018-05-01 RX ORDER — VENLAFAXINE HYDROCHLORIDE 75 MG/1
TABLET, EXTENDED RELEASE ORAL DAILY
COMMUNITY
End: 2018-09-18 | Stop reason: CLARIF

## 2018-05-01 RX ORDER — CARBIDOPA AND LEVODOPA 25; 100 MG/1; MG/1
1 TABLET ORAL 3 TIMES DAILY
COMMUNITY
End: 2018-08-29

## 2018-05-01 NOTE — TELEPHONE ENCOUNTER
----- Message from Kelley Arguello sent at 5/1/2018  7:50 AM CDT -----  Contact: [phone]/self  Patient would like to be seen sooner than the next available appointment. Please advise.

## 2018-05-01 NOTE — PROGRESS NOTES
HPI:  Danika Voss is a 52 y.o. year old female that  presents with complaint of pain that is uncontrolled.  She states that she is following as well has bruises on her arm and leg.  She is having a hard time dealing with the loss of her father and previously her .  She has been to the emergency room for evaluation of fall and cleared.  She has appointments with grief counselor to deal with her depression ongoing.  She still is dealing with her previous addiction to pain medication.  Chief Complaint   Patient presents with    Fall     pt has had 2 falls recently--has bruises on arms and legs    Insomnia     pt states she is going to grief counselor tmrw--she recently lost her father   .     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    MRSA, Peptostreptococcus    NSAID induced gastritis 9/8/2015    Obesity     Parkinson disease 11/17/2015     Social History     Social History    Marital status:      Spouse name: N/A    Number of children: 3    Years of education: N/A     Occupational History    Homemaker      Social History Main Topics    Smoking status: Never Smoker    Smokeless tobacco: Never Used    Alcohol use 0.0 oz/week      Comment: occassionally, 2 beers for football games    Drug use: No    Sexual activity: Not on file     Other Topics Concern    Not on file     Social History Narrative    Living with son and his family here in [facility]. Recently  (06/17)     Past Surgical History:   Procedure Laterality Date    ABCESS DRAINAGE Left 8/6/2015    left knee washout    CARDIAC SURGERY  4/7/2010    artery on wrong side, performed in [place]    CHOLECYSTECTOMY  3/2010    HYSTERECTOMY      No cervix     "PERIPHERALLY INSERTED CENTRAL CATHETER INSERTION Right 8/6/2015    shoulder cyst removal Left 2014    per[facility] at [facility]    TONSILLECTOMY      TOTAL KNEE ARTHROPLASTY Bilateral right 6/2014, left 9/30/2014    TOTAL KNEE ARTHROPLASTY Left 7/21/2015    revision     Family History   Problem Relation Age of Onset    Bladder Cancer Father     Heart disease Father         heart attack    Diabetes Father     Hypertension Father     Pulmonary embolism Mother            Review of Systems  General ROS: negative for chills, fever or weight loss  ENT ROS: negative for epistaxis, sore throat or rhinorrhea  Respiratory ROS: no cough, shortness of breath, or wheezing  Cardiovascular ROS: no chest pain or dyspnea on exertion  Gastrointestinal ROS: no abdominal pain, change in bowel habits, or black/ bloody stools    Physical Exam:  /78 (BP Location: Right arm, Patient Position: Sitting, BP Method: Medium (Manual))   Pulse 97   Temp 98.4 °F (36.9 °C) (Oral)   Resp 20   Ht 5' 10" (1.778 m)   Wt (!) 137 kg (302 lb)   SpO2 98%   BMI 43.33 kg/m²   General appearance: alert, cooperative, no distress  Constitutional:Oriented to person, place, and time.appears well-developed and well-nourished.  HEENT: Normocephalic, atraumatic, neck symmetrical, no nasal discharge, TM- clear bilaterally  Lungs: clear to auscultation bilaterally, no dullness to percussion bilaterally  Heart: regular rate and rhythm without rub; no displacement of the PMI , S1&S2 present  Abdomen: soft, non-tender; bowel sounds normoactive; no organomegaly  Skin: Large ecchymosis on the right arm without firmness.  Physical Exam    LABS:    Complete Blood Count  Lab Results   Component Value Date    RBC 4.99 02/20/2018    HGB 15.4 02/20/2018    HCT 46.3 02/20/2018    MCV 93 02/20/2018    MCH 30.9 02/20/2018    MCHC 33.3 02/20/2018    RDW 14.5 02/20/2018     02/20/2018    MPV 12.5 02/20/2018    GRAN 5.1 " 02/20/2018    GRAN 49.8 02/20/2018    LYMPH 4.0 02/20/2018    LYMPH 38.9 02/20/2018    MONO 1.0 02/20/2018    MONO 9.9 02/20/2018    EOS 0.1 02/20/2018    BASO 0.04 02/20/2018    EOSINOPHIL 0.7 02/20/2018    BASOPHIL 0.4 02/20/2018    DIFFMETHOD Automated 02/20/2018       Comprehensive Metabolic Panel  Lab Results   Component Value Date     (H) 02/20/2018    BUN 13 02/20/2018    CREATININE 0.7 02/20/2018     (L) 02/20/2018    K 4.8 02/20/2018     02/20/2018    PROT 9.2 (H) 02/20/2018    ALBUMIN 3.9 02/20/2018    BILITOT 1.7 (H) 02/20/2018     (H) 02/20/2018    ALKPHOS 122 02/20/2018    CO2 19 (L) 02/20/2018     (H) 02/20/2018    ANIONGAP 12 02/20/2018    EGFRNONAA >60.0 02/20/2018    ESTGFRAFRICA >60.0 02/20/2018       LIPID  Lab Results   Component Value Date    CHOL 105 (L) 01/16/2016    HDL 25 01/16/2016         TSH  Lab Results   Component Value Date    TSH 1.210 05/02/2018       Current Outpatient Prescriptions   Medication Sig Dispense Refill    carbidopa-levodopa  mg (SINEMET)  mg per tablet Take 1 tablet by mouth 3 (three) times daily.      diltiazem (CARDIZEM CD) 240 MG 24 hr capsule Take 1 capsule (240 mg total) by mouth once daily. 30 capsule 11    flecainide (TAMBOCOR) 50 MG Tab Take 100 mg by mouth 2 (two) times daily.      levothyroxine (SYNTHROID) 25 MCG tablet Take 1 tablet (25 mcg total) by mouth once daily. 10 tablet 0    propranolol (INDERAL LA) 60 MG 24 hr capsule Take 1 capsule (60 mg total) by mouth once daily. 10 capsule 0    venlafaxine (EFFEXOR-XR) 150 MG Cp24 Take 1 capsule (150 mg total) by mouth once daily. 10 capsule 0    venlafaxine 75 mg TR24 Take by mouth.      losartan (COZAAR) 100 MG tablet Take 1 tablet (100 mg total) by mouth once daily. 30 tablet 11    traZODone (DESYREL) 50 MG tablet Take 1 tablet (50 mg total) by mouth every evening. 90 tablet 2     No current facility-administered medications for this visit.         Assessment:    ICD-10-CM ICD-9-CM    1. Insomnia, unspecified type G47.00 780.52 traZODone (DESYREL) 50 MG tablet   2. Acquired hypothyroidism E03.9 244.9 TSH   3. Essential hypertension I10 401.9          Plan:    Follow-up in 2 weeks (on 5/15/2018).          Marge Davila MD

## 2018-05-01 NOTE — TELEPHONE ENCOUNTER
Left message for patient to return call to office to reschedule appointment.      ----- Message from Vidya Malik MA sent at 4/26/2018  4:32 PM CDT -----  Anup Man Staff  Caller: Unspecified (Today,  3:33 PM)         Pt has appt for 5/22/2018 but would like to be seen earlier than that, pain in left knee

## 2018-05-04 ENCOUNTER — TELEPHONE (OUTPATIENT)
Dept: FAMILY MEDICINE | Facility: CLINIC | Age: 53
End: 2018-05-04

## 2018-05-04 ENCOUNTER — PATIENT MESSAGE (OUTPATIENT)
Dept: FAMILY MEDICINE | Facility: CLINIC | Age: 53
End: 2018-05-04

## 2018-05-04 NOTE — TELEPHONE ENCOUNTER
----- Message from Amalia Agarwal sent at 5/4/2018  8:09 AM CDT -----  Contact: Self/ [phone]  Patient called in to speak with you since she is still not able to sleep with the medication she was prescribed.    Please call and advise.

## 2018-05-04 NOTE — TELEPHONE ENCOUNTER
Patient states she has tried taking the Trazodone with sleepy time tea and melantonin and it's not working for her, she states she will like another medication for sleep.

## 2018-05-04 NOTE — TELEPHONE ENCOUNTER
----- Message from Mann Block sent at 5/4/2018  4:07 PM CDT -----  Contact: [phone]  Patient advised the traZODone (DESYREL) 50 MG tablet is not working and requested to speak with the nurse. Please call and advise.

## 2018-05-07 ENCOUNTER — TELEPHONE (OUTPATIENT)
Dept: FAMILY MEDICINE | Facility: CLINIC | Age: 53
End: 2018-05-07

## 2018-05-07 DIAGNOSIS — G47.00 INSOMNIA, UNSPECIFIED TYPE: Primary | ICD-10-CM

## 2018-05-07 RX ORDER — ZOLPIDEM TARTRATE 5 MG/1
5 TABLET ORAL NIGHTLY PRN
Qty: 30 TABLET | Refills: 2
Start: 2018-05-07 | End: 2018-05-15

## 2018-05-07 NOTE — TELEPHONE ENCOUNTER
----- Message from Dafne Chacon sent at 5/7/2018  7:39 AM CDT -----  No. [phone]   Patient is having body pain and cannot sleep.   Please call.

## 2018-05-07 NOTE — TELEPHONE ENCOUNTER
Patient states she has been 200mg of the Trazodone since 5/1 and she still hasn't been sleeping at all.

## 2018-05-08 NOTE — TELEPHONE ENCOUNTER
----- Message from Jose Cooper sent at 5/7/2018  4:52 PM CDT -----  Contact: [phone]/self  Pt requesting to speak with you concerning not being able to sleep.  Pt states she doesn't know how to get on her patient portal and she would like a callback from the nurse.  Please call and advise

## 2018-05-09 ENCOUNTER — TELEPHONE (OUTPATIENT)
Dept: FAMILY MEDICINE | Facility: CLINIC | Age: 53
End: 2018-05-09

## 2018-05-09 NOTE — TELEPHONE ENCOUNTER
----- Message from Carole Araujo sent at 5/8/2018  3:25 PM CDT -----  Contact: [phone]  Patient called in requesting to speak with you. Patient prefers to speak with a nurse. Please advise.

## 2018-05-10 ENCOUNTER — TELEPHONE (OUTPATIENT)
Dept: FAMILY MEDICINE | Facility: CLINIC | Age: 53
End: 2018-05-10

## 2018-05-10 NOTE — TELEPHONE ENCOUNTER
Patient called in for pain medication for her legs. She states the pain in her legs are keeping her up at night.

## 2018-05-10 NOTE — TELEPHONE ENCOUNTER
----- Message from Amalia Agarwal sent at 5/10/2018 12:56 PM CDT -----  Contact: Self/ [phone]  Patient called to let you know she is not sleeping and her legs are aching. Patient said she felt 3 times and has been sleeping about 3 hrs at night. Patient needs something called in.     Please call and advise.

## 2018-05-11 NOTE — TELEPHONE ENCOUNTER
Patient would like to have referral to pain management. Referral would have to be external because of her insurance.

## 2018-05-15 ENCOUNTER — OFFICE VISIT (OUTPATIENT)
Dept: FAMILY MEDICINE | Facility: CLINIC | Age: 53
End: 2018-05-15
Payer: MEDICAID

## 2018-05-15 VITALS
BODY MASS INDEX: 41.95 KG/M2 | TEMPERATURE: 98 F | OXYGEN SATURATION: 97 % | HEIGHT: 70 IN | HEART RATE: 104 BPM | SYSTOLIC BLOOD PRESSURE: 128 MMHG | WEIGHT: 293 LBS | RESPIRATION RATE: 18 BRPM | DIASTOLIC BLOOD PRESSURE: 78 MMHG

## 2018-05-15 DIAGNOSIS — M79.662 PAIN OF LEFT LOWER LEG: Primary | ICD-10-CM
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
## 2018-05-15 PROCEDURE — 99999 PR PBB SHADOW E&M-EST. PATIENT-LVL IV: CPT | Mod: PBBFAC,,, | Performed by: FAMILY MEDICINE

## 2018-05-15 PROCEDURE — 99214 OFFICE O/P EST MOD 30 MIN: CPT | Mod: S$PBB,,, | Performed by: FAMILY MEDICINE

## 2018-05-15 PROCEDURE — 99214 OFFICE O/P EST MOD 30 MIN: CPT | Mod: PBBFAC,PN | Performed by: FAMILY MEDICINE

## 2018-05-15 RX ORDER — GABAPENTIN 600 MG/1
TABLET ORAL
COMMUNITY
Start: 2018-05-14 | End: 2018-05-15 | Stop reason: DRUGHIGH

## 2018-05-15 RX ORDER — MIRTAZAPINE 15 MG/1
TABLET, FILM COATED ORAL
COMMUNITY
Start: 2018-05-14 | End: 2018-05-30

## 2018-05-15 RX ORDER — CARBIDOPA AND LEVODOPA 25; 100 MG/1; MG/1
TABLET, EXTENDED RELEASE ORAL
COMMUNITY
Start: 2018-05-08 | End: 2018-05-15

## 2018-05-15 RX ORDER — GABAPENTIN 300 MG/1
300 CAPSULE ORAL 3 TIMES DAILY
Qty: 90 CAPSULE | Refills: 2 | Status: SHIPPED | OUTPATIENT
Start: 2018-05-15 | End: 2018-07-11

## 2018-05-15 NOTE — PROGRESS NOTES
HPI:  Danika Voss is a 52 y.o. year old female that  presents fo f/u. She has been doing much better since her last visit and is sleeping better now.Her leg pain is still present and would like to try something for that  As well as a agrees with pain management referal  Chief Complaint   Patient presents with    Follow-up     lab results--pt has been having trembling in hands--pt would like to have referral for pain management   .     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    MRSA, Peptostreptococcus    NSAID induced gastritis 9/8/2015    Obesity     Parkinson disease 11/17/2015     Social History     Social History    Marital status:      Spouse name: N/A    Number of children: 3    Years of education: N/A     Occupational History    Homemaker      Social History Main Topics    Smoking status: Never Smoker    Smokeless tobacco: Never Used    Alcohol use 0.0 oz/week      Comment: occassionally, 2 beers for football games    Drug use: No    Sexual activity: Not on file     Other Topics Concern    Not on file     Social History Narrative    Living with son and his family here in [facility]. Recently  (06/17)     Past Surgical History:   Procedure Laterality Date    ABCESS DRAINAGE Left 8/6/2015    left knee washout    CARDIAC SURGERY  4/7/2010    artery on wrong side, performed in [place]    CHOLECYSTECTOMY  3/2010    HYSTERECTOMY      No cervix    PERIPHERALLY INSERTED CENTRAL CATHETER INSERTION Right 8/6/2015    shoulder cyst removal Left 2014    performed by Dr[facility] Arroyo at [facility]    TONSILLECTOMY      TOTAL KNEE ARTHROPLASTY Bilateral right 6/2014, left 9/30/2014    TOTAL KNEE ARTHROPLASTY Left  "7/21/2015    revision     Family History   Problem Relation Age of Onset    Bladder Cancer Father     Heart disease Father         heart attack    Diabetes Father     Hypertension Father     Pulmonary embolism Mother            Review of Systems  General ROS: negative for chills, fever or weight loss  ENT ROS: negative for epistaxis, sore throat or rhinorrhea  Respiratory ROS: no cough, shortness of breath, or wheezing  Cardiovascular ROS: no chest pain or dyspnea on exertion  Gastrointestinal ROS: no abdominal pain, change in bowel habits, or black/ bloody stools    Physical Exam:  /78 (BP Location: Right arm, Patient Position: Sitting, BP Method: Medium (Manual))   Pulse 104   Temp 98 °F (36.7 °C) (Oral)   Resp 18   Ht 5' 10" (1.778 m)   Wt 134.3 kg (296 lb)   SpO2 97%   BMI 42.47 kg/m²   General appearance: alert, cooperative, no distress  Constitutional:Oriented to person, place, and time.appears well-developed and well-nourished.  HEENT: Normocephalic, atraumatic, neck symmetrical, no nasal discharge, TM- clear bilaterally  Lungs: clear to auscultation bilaterally, no dullness to percussion bilaterally  Heart: regular rate and rhythm without rub; no displacement of the PMI , S1&S2 present  Abdomen: soft, non-tender; bowel sounds normoactive; no organomegaly  Physical Exam    LABS:    Complete Blood Count  Lab Results   Component Value Date    RBC 4.99 02/20/2018    HGB 15.4 02/20/2018    HCT 46.3 02/20/2018    MCV 93 02/20/2018    MCH 30.9 02/20/2018    MCHC 33.3 02/20/2018    RDW 14.5 02/20/2018     02/20/2018    MPV 12.5 02/20/2018    GRAN 5.1 02/20/2018    GRAN 49.8 02/20/2018    LYMPH 4.0 02/20/2018    LYMPH 38.9 02/20/2018    MONO 1.0 02/20/2018    MONO 9.9 02/20/2018    EOS 0.1 02/20/2018    BASO 0.04 02/20/2018    EOSINOPHIL 0.7 02/20/2018    BASOPHIL 0.4 02/20/2018    DIFFMETHOD Automated 02/20/2018       Comprehensive Metabolic Panel  Lab Results   Component Value Date    GLU " 122 (H) 02/20/2018    BUN 13 02/20/2018    CREATININE 0.7 02/20/2018     (L) 02/20/2018    K 4.8 02/20/2018     02/20/2018    PROT 9.2 (H) 02/20/2018    ALBUMIN 3.9 02/20/2018    BILITOT 1.7 (H) 02/20/2018     (H) 02/20/2018    ALKPHOS 122 02/20/2018    CO2 19 (L) 02/20/2018     (H) 02/20/2018    ANIONGAP 12 02/20/2018    EGFRNONAA >60.0 02/20/2018    ESTGFRAFRICA >60.0 02/20/2018       LIPID  Lab Results   Component Value Date    CHOL 105 (L) 01/16/2016    HDL 25 01/16/2016         TSH  Lab Results   Component Value Date    TSH 1.210 05/02/2018       Current Outpatient Prescriptions   Medication Sig Dispense Refill    carbidopa-levodopa  mg (SINEMET)  mg per tablet Take 1 tablet by mouth 3 (three) times daily.      diltiazem (CARDIZEM CD) 240 MG 24 hr capsule Take 1 capsule (240 mg total) by mouth once daily. 30 capsule 11    flecainide (TAMBOCOR) 50 MG Tab Take 100 mg by mouth 2 (two) times daily.      levothyroxine (SYNTHROID) 25 MCG tablet Take 1 tablet (25 mcg total) by mouth once daily. 10 tablet 0    mirtazapine (REMERON) 15 MG tablet       venlafaxine (EFFEXOR-XR) 150 MG Cp24 Take 1 capsule (150 mg total) by mouth once daily. 10 capsule 0    venlafaxine 75 mg TR24 Take by mouth.      gabapentin (NEURONTIN) 300 MG capsule Take 1 capsule (300 mg total) by mouth 3 (three) times daily. 90 capsule 2    losartan (COZAAR) 100 MG tablet Take 1 tablet (100 mg total) by mouth once daily. 30 tablet 11    propranolol (INDERAL LA) 60 MG 24 hr capsule Take 1 capsule (60 mg total) by mouth once daily. 10 capsule 0     No current facility-administered medications for this visit.        Assessment:    ICD-10-CM ICD-9-CM    1. Pain of left lower leg M79.662 729.5 gabapentin (NEURONTIN) 300 MG capsule      Ambulatory Referral to Pain Clinic   2. Fall, initial encounter W19.XXXA E888.9 Ambulatory Referral to Neurology         Plan:    Follow-up in 3 months (on  8/15/2018).          Marge Davila MD

## 2018-05-21 DIAGNOSIS — M25.562 LEFT KNEE PAIN, UNSPECIFIED CHRONICITY: Primary | ICD-10-CM

## 2018-05-22 ENCOUNTER — OFFICE VISIT (OUTPATIENT)
Dept: ORTHOPEDICS | Facility: CLINIC | Age: 53
End: 2018-05-22
Payer: MEDICAID

## 2018-05-22 ENCOUNTER — TELEPHONE (OUTPATIENT)
Dept: FAMILY MEDICINE | Facility: CLINIC | Age: 53
End: 2018-05-22

## 2018-05-22 DIAGNOSIS — Z96.652 PAIN DUE TO TOTAL LEFT KNEE REPLACEMENT, INITIAL ENCOUNTER: Primary | ICD-10-CM

## 2018-05-22 DIAGNOSIS — T84.84XA PAIN DUE TO TOTAL LEFT KNEE REPLACEMENT, INITIAL ENCOUNTER: Primary | ICD-10-CM

## 2018-05-22 PROCEDURE — 99212 OFFICE O/P EST SF 10 MIN: CPT | Mod: PBBFAC,PN | Performed by: ORTHOPAEDIC SURGERY

## 2018-05-22 PROCEDURE — 99999 PR PBB SHADOW E&M-EST. PATIENT-LVL II: CPT | Mod: PBBFAC,,, | Performed by: ORTHOPAEDIC SURGERY

## 2018-05-22 PROCEDURE — 99213 OFFICE O/P EST LOW 20 MIN: CPT | Mod: S$PBB,,, | Performed by: ORTHOPAEDIC SURGERY

## 2018-05-22 NOTE — LETTER
May 22, 2018      Gina Kwong NP  [address] [facility]  [facility] [address]  [facility] [address]  Phone: [phone]  Fax: [phone]          Patient: Danika Voss   MR Number: [id number]   YOB: 1965   Date of Visit: 5/22/2018       Dear Gina Kwong:    Thank you for referring Danika Voss to me for evaluation. Attached you will find relevant portions of my assessment and plan of care.    If you have questions, please do not hesitate to call me. I look forward to following Danika Voss along with you.    Sincerely,    Jeremy Hand MD    Enclosure  CC:  No Recipients    If you would like to receive this communication electronically, please contact [email or web address] or [phone] to request more information on [employer] Link access.    For providers and/or their staff who would like to refer a patient to [facility], please contact us through our one-stop-shop provider referral line, [facility], at [phone].    If you feel you have received this communication in error or would no longer like to receive these types of communications, please e-mail [email or web address]

## 2018-05-22 NOTE — TELEPHONE ENCOUNTER
Patient calling in to check status of pain management. Informed patient coordinator has been working or referral everyday. Patient verbalized understanding.

## 2018-05-22 NOTE — TELEPHONE ENCOUNTER
----- Message from Anisha Huber sent at 5/22/2018 11:32 AM CDT -----  Contact: Self [phone]  Patient is requesting to talk to nurse has personal questions. Please advice

## 2018-05-22 NOTE — PROGRESS NOTES
Subjective:      Patient ID: Danika Voss is a 52 y.o. female.    Chief Complaint:  Left knee instability      HPI     The patient complains of ongoing giving way in the left knee. She had a revision left total knee several years ago complicated by postoperative infection.  She reports that this was successfully treated with surgery and antibiotics.  Ever since, she has noted instability and giving way.  She denies any recent fever, constitutional symptoms or change in the status of her knee.    Review of Systems   Constitution: Negative for fever and weight loss.   HENT: Negative for congestion.    Eyes: Negative for visual disturbance.   Cardiovascular: Negative for chest pain.   Respiratory: Negative for shortness of breath.    Hematologic/Lymphatic: Negative for bleeding problem. Does not bruise/bleed easily.   Skin: Negative for poor wound healing.   Musculoskeletal: Positive for joint pain.   Gastrointestinal: Negative for abdominal pain.   Genitourinary: Negative for dysuria.   Neurological: Negative for seizures.   Psychiatric/Behavioral: Negative for altered mental status.   Allergic/Immunologic: Negative for persistent infections.         Objective:            Ortho/SPM Exam    Overweight woman  Walks with a slight limp but most  There are multiple healed incisions in the left knee  No erythema, warmth or drainage  The patient is too large to  for effusion  There is diffuse allodynia about the left knee  Active range of motion 0-90 degrees with pain  1+ valgus laxity noted  Distal neurovascular exam is intact    I reviewed the relevant radiographic images for the patient's condition:  There is a well-positioned and well-fixed revision type total knee device without evident complicating process        Assessment:       Encounter Diagnosis   Name Primary?    Pain due to total left knee replacement, initial encounter Yes            Although I cannot rule out chronic, low-grade infection, the patient's  current complaints and impairment are mostly the result of low-grade instability, typical for the procedure  Plan:       Diagnoses and all orders for this visit:    Pain due to total left knee replacement, initial encounter          I explained my diagnostic impression and the reasoning behind it in detail, using layman's terms.  Models and/or pictures were used to help in the explanation.    Weight loss recommended    Brace given    Use of cane recommended    Over the counter analgesics only    Patient instructed to call for any acute signs of infection such as fever, swelling or drainage

## 2018-05-30 ENCOUNTER — OFFICE VISIT (OUTPATIENT)
Dept: FAMILY MEDICINE | Facility: CLINIC | Age: 53
End: 2018-05-30
Payer: MEDICAID

## 2018-05-30 ENCOUNTER — TELEPHONE (OUTPATIENT)
Dept: FAMILY MEDICINE | Facility: CLINIC | Age: 53
End: 2018-05-30

## 2018-05-30 VITALS
HEART RATE: 89 BPM | OXYGEN SATURATION: 97 % | DIASTOLIC BLOOD PRESSURE: 68 MMHG | HEIGHT: 70 IN | RESPIRATION RATE: 20 BRPM | SYSTOLIC BLOOD PRESSURE: 124 MMHG | WEIGHT: 293 LBS | BODY MASS INDEX: 41.95 KG/M2 | TEMPERATURE: 99 F

## 2018-05-30 DIAGNOSIS — J22 LOWER RESPIRATORY INFECTION: Primary | ICD-10-CM

## 2018-05-30 DIAGNOSIS — R05.9 COUGH: ICD-10-CM

## 2018-05-30 DIAGNOSIS — I10 ESSENTIAL HYPERTENSION: ICD-10-CM

## 2018-05-30 PROCEDURE — 99214 OFFICE O/P EST MOD 30 MIN: CPT | Mod: PBBFAC,PN | Performed by: FAMILY MEDICINE

## 2018-05-30 PROCEDURE — 99215 OFFICE O/P EST HI 40 MIN: CPT | Mod: 25,S$PBB,, | Performed by: FAMILY MEDICINE

## 2018-05-30 PROCEDURE — 99999 PR PBB SHADOW E&M-EST. PATIENT-LVL IV: CPT | Mod: PBBFAC,,, | Performed by: FAMILY MEDICINE

## 2018-05-30 RX ORDER — MIRTAZAPINE 30 MG/1
TABLET, FILM COATED ORAL
Refills: 0 | COMMUNITY
Start: 2018-05-29 | End: 2018-10-02

## 2018-05-30 RX ORDER — ALBUTEROL SULFATE 90 UG/1
2 AEROSOL, METERED RESPIRATORY (INHALATION) EVERY 6 HOURS PRN
Qty: 18 G | Refills: 1 | Status: SHIPPED | OUTPATIENT
Start: 2018-05-30 | End: 2020-08-19 | Stop reason: SDUPTHER

## 2018-05-30 RX ORDER — AZITHROMYCIN 250 MG/1
TABLET, FILM COATED ORAL
Qty: 6 TABLET | Refills: 0 | Status: SHIPPED | OUTPATIENT
Start: 2018-05-30 | End: 2018-06-15

## 2018-05-30 RX ORDER — GUAIFENESIN 1200 MG/1
1 TABLET, EXTENDED RELEASE ORAL 2 TIMES DAILY
Qty: 30 TABLET | Refills: 1 | COMMUNITY
Start: 2018-05-30 | End: 2018-06-15

## 2018-05-30 NOTE — TELEPHONE ENCOUNTER
----- Message from Anisha Huber sent at 5/30/2018  8:53 AM CDT -----  Contact: Self [phone]  Patient is calling to talk to nurse concerning to see if she can be seen today due to a severe cough. Please advice

## 2018-05-30 NOTE — PROGRESS NOTES
HPI:  Danika Voss is a 52 y.o. year old female that  presents with cough  That is productive with slight amount of clear sputum. She denies any fever. She started with a cough that has progressively gotten worse. She has not used an inhaler in long time.She has been getting chills at night.  Chief Complaint   Patient presents with    Cough     non-productive cough, pt states she hears rattling in her chest along with chills at night    .     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    MRSA, Peptostreptococcus    NSAID induced gastritis 9/8/2015    Obesity     Parkinson disease 11/17/2015     Social History     Social History    Marital status:      Spouse name: N/A    Number of children: 3    Years of education: N/A     Occupational History    Homemaker      Social History Main Topics    Smoking status: Never Smoker    Smokeless tobacco: Never Used    Alcohol use 0.0 oz/week      Comment: occassionally, 2 beers for football games    Drug use: No    Sexual activity: Not on file     Other Topics Concern    Not on file     Social History Narrative    Living with son and his family here in [facility]. Recently  (06/17)     Past Surgical History:   Procedure Laterality Date    ABCESS DRAINAGE Left 8/6/2015    left knee washout    CARDIAC SURGERY  4/7/2010    artery on wrong side, performed in [place]    CHOLECYSTECTOMY  3/2010    HYSTERECTOMY      No cervix    PERIPHERALLY INSERTED CENTRAL CATHETER INSERTION Right 8/6/2015    shoulder cyst removal Left 2014    per[facility] at [facility]    TONSILLECTOMY      TOTAL KNEE ARTHROPLASTY Bilateral right 6/2014, left 9/30/2014    TOTAL KNEE ARTHROPLASTY  "Left 7/21/2015    revision     Family History   Problem Relation Age of Onset    Bladder Cancer Father     Heart disease Father         heart attack    Diabetes Father     Hypertension Father     Pulmonary embolism Mother            Review of Systems  General ROS: negative for chills, fever or weight loss  ENT ROS: negative for epistaxis, sore throat or rhinorrhea  Respiratory ROS: pos cough, shortness of breath, or wheezing  Cardiovascular ROS: no chest pain or dyspnea on exertion  Gastrointestinal ROS: no abdominal pain, change in bowel habits, or black/ bloody stools    Physical Exam:  /68   Pulse 89   Temp 98.6 °F (37 °C) (Oral)   Resp 20   Ht 5' 10" (1.778 m)   Wt (!) 138.8 kg (306 lb)   SpO2 97%   BMI 43.91 kg/m²   General appearance: alert, cooperative, no distress  Constitutional:Oriented to person, place, and time.appears well-developed and well-nourished, with mild resp distress due to cough.  HEENT: Normocephalic, atraumatic, neck symmetrical, no nasal discharge, TM- clear bilaterally  Lungs: right sided rhonchi, no dullness to percussion bilaterally; better respiratory effort   Heart: regular rate and rhythm without rub; no displacement of the PMI , S1&S2 present  Abdomen: soft, non-tender; bowel sounds normoactive; no organomegaly  Physical Exam    LABS:    Complete Blood Count  Lab Results   Component Value Date    RBC 4.25 05/30/2018    HGB 13.6 05/30/2018    HCT 41.0 05/30/2018    MCV 97 05/30/2018    MCH 32.0 (H) 05/30/2018    MCHC 33.2 05/30/2018    RDW 13.3 05/30/2018     05/30/2018    MPV 11.3 05/30/2018    GRAN 3.9 05/30/2018    GRAN 40.0 05/30/2018    LYMPH 4.3 05/30/2018    LYMPH 44.0 05/30/2018    MONO 1.0 05/30/2018    MONO 9.7 05/30/2018    EOS 0.5 05/30/2018    BASO 0.08 05/30/2018    EOSINOPHIL 5.5 05/30/2018    BASOPHIL 0.8 05/30/2018    DIFFMETHOD Automated 05/30/2018       Comprehensive Metabolic Panel  Lab Results   Component Value Date    GLU 78 05/30/2018    " BUN 13 05/30/2018    CREATININE 0.59 05/30/2018     05/30/2018    K 3.5 05/30/2018     05/30/2018    PROT 7.7 05/30/2018    ALBUMIN 3.6 05/30/2018    BILITOT 0.5 05/30/2018    AST 75 (H) 05/30/2018    ALKPHOS 123 05/30/2018    CO2 25 05/30/2018     (H) 05/30/2018    ANIONGAP 9 05/30/2018    EGFRNONAA >60.0 05/30/2018    ESTGFRAFRICA >60.0 05/30/2018       LIPID  Lab Results   Component Value Date    CHOL 105 (L) 01/16/2016    HDL 25 01/16/2016         TSH  Lab Results   Component Value Date    TSH 1.210 05/02/2018       Current Outpatient Prescriptions   Medication Sig Dispense Refill    carbidopa-levodopa  mg (SINEMET)  mg per tablet Take 1 tablet by mouth 3 (three) times daily.      diltiazem (CARDIZEM CD) 240 MG 24 hr capsule Take 1 capsule (240 mg total) by mouth once daily. 30 capsule 11    flecainide (TAMBOCOR) 50 MG Tab Take 100 mg by mouth 2 (two) times daily.      gabapentin (NEURONTIN) 300 MG capsule Take 1 capsule (300 mg total) by mouth 3 (three) times daily. 90 capsule 2    levothyroxine (SYNTHROID) 25 MCG tablet Take 1 tablet (25 mcg total) by mouth once daily. 10 tablet 0    losartan (COZAAR) 100 MG tablet Take 1 tablet (100 mg total) by mouth once daily. 30 tablet 11    mirtazapine (REMERON) 30 MG tablet Take 1 tablet(s) every day by oral route.  0    propranolol (INDERAL LA) 60 MG 24 hr capsule Take 1 capsule (60 mg total) by mouth once daily. 10 capsule 0    venlafaxine (EFFEXOR-XR) 150 MG Cp24 Take 1 capsule (150 mg total) by mouth once daily. 10 capsule 0    venlafaxine 75 mg TR24 Take by mouth.      albuterol 90 mcg/actuation inhaler Inhale 2 puffs into the lungs every 6 (six) hours as needed for Wheezing. Rescue 18 g 1    azithromycin (ZITHROMAX Z-CHUCK) 250 MG tablet 2 tabs on day one then 1 tab po q day for 4 days 6 tablet 0    benzonatate (TESSALON) 100 MG capsule Take 1 capsule (100 mg total) by mouth 3 (three) times daily as needed for Cough. 20  capsule 0    guaiFENesin 1,200 mg Ta12 Take 1 tablet by mouth 2 (two) times daily. 30 tablet 1     No current facility-administered medications for this visit.        Assessment:    ICD-10-CM ICD-9-CM    1. Lower respiratory infection J22 519.8 azithromycin (ZITHROMAX Z-CHUCK) 250 MG tablet      albuterol 90 mcg/actuation inhaler      guaiFENesin 1,200 mg Ta12      X-Ray Chest PA And Lateral   2. Essential hypertension I10 401.9    3. Cough R05 786.2 albuterol 90 mcg/actuation inhaler      guaiFENesin 1,200 mg Ta12      X-Ray Chest PA And Lateral         Plan:    Follow-up if symptoms worsen or fail to improve.          Marge Davila MD

## 2018-06-14 ENCOUNTER — TELEPHONE (OUTPATIENT)
Dept: FAMILY MEDICINE | Facility: CLINIC | Age: 53
End: 2018-06-14

## 2018-06-14 NOTE — TELEPHONE ENCOUNTER
Patient states her left leg is three times bigger than its usual size and she has been elevating it since yesterday with no change. Patient denies any warmth but states it does have redness. Please advise.

## 2018-06-14 NOTE — TELEPHONE ENCOUNTER
----- Message from Brittany Piña sent at 6/14/2018 10:22 AM CDT -----  Contact: Self. [phone]  Patient would like to speak with you about her leg swelling and she would like to know what can she do. Please advise

## 2018-06-15 ENCOUNTER — HOSPITAL ENCOUNTER (OUTPATIENT)
Dept: RADIOLOGY | Facility: HOSPITAL | Age: 53
Discharge: HOME | End: 2018-06-15
Attending: FAMILY MEDICINE
Payer: MEDICAID

## 2018-06-15 ENCOUNTER — OFFICE VISIT (OUTPATIENT)
Dept: FAMILY MEDICINE | Facility: CLINIC | Age: 53
End: 2018-06-15
Payer: MEDICAID

## 2018-06-15 VITALS
HEIGHT: 70 IN | RESPIRATION RATE: 18 BRPM | DIASTOLIC BLOOD PRESSURE: 68 MMHG | WEIGHT: 293 LBS | SYSTOLIC BLOOD PRESSURE: 108 MMHG | OXYGEN SATURATION: 96 % | BODY MASS INDEX: 41.95 KG/M2 | TEMPERATURE: 98 F | HEART RATE: 75 BPM

## 2018-06-15 DIAGNOSIS — L03.116 CELLULITIS OF LEFT LOWER EXTREMITY: Primary | ICD-10-CM

## 2018-06-15 DIAGNOSIS — I10 ESSENTIAL HYPERTENSION: ICD-10-CM

## 2018-06-15 DIAGNOSIS — L03.116 CELLULITIS OF LEFT LOWER EXTREMITY: ICD-10-CM

## 2018-06-15 DIAGNOSIS — R60.0 LEG EDEMA, LEFT: ICD-10-CM

## 2018-06-15 PROCEDURE — 99214 OFFICE O/P EST MOD 30 MIN: CPT | Mod: S$PBB,,, | Performed by: FAMILY MEDICINE

## 2018-06-15 PROCEDURE — 99213 OFFICE O/P EST LOW 20 MIN: CPT | Mod: PBBFAC,PN,25 | Performed by: FAMILY MEDICINE

## 2018-06-15 PROCEDURE — 99999 PR PBB SHADOW E&M-EST. PATIENT-LVL III: CPT | Mod: PBBFAC,,, | Performed by: FAMILY MEDICINE

## 2018-06-15 PROCEDURE — 93971 EXTREMITY STUDY: CPT | Mod: 26,,, | Performed by: RADIOLOGY

## 2018-06-15 PROCEDURE — 93971 EXTREMITY STUDY: CPT | Mod: TC

## 2018-06-15 RX ORDER — GABAPENTIN 600 MG/1
TABLET ORAL
Refills: 0 | COMMUNITY
Start: 2018-06-09 | End: 2018-09-18 | Stop reason: SDUPTHER

## 2018-06-15 RX ORDER — BUPRENORPHINE 5 UG/H
PATCH TRANSDERMAL
Refills: 0 | COMMUNITY
Start: 2018-06-05 | End: 2018-07-12

## 2018-06-15 RX ORDER — CLINDAMYCIN HYDROCHLORIDE 300 MG/1
300 CAPSULE ORAL DAILY
Qty: 10 CAPSULE | Refills: 0 | Status: SHIPPED | OUTPATIENT
Start: 2018-06-15 | End: 2018-06-25

## 2018-06-15 RX ORDER — LEVOTHYROXINE SODIUM 100 UG/1
100 TABLET ORAL DAILY
Refills: 2 | COMMUNITY
Start: 2018-06-07 | End: 2018-06-15

## 2018-06-15 NOTE — PROGRESS NOTES
HPI:  Danika Voss is a 52 y.o. year old female that  presents with  Left leg swelling for  2 days. She denies any trauma. She has had swelling of this leg before but not with the pain and drainage that she is having now.  Chief Complaint   Patient presents with    Leg Swelling     left leg for about 2 days & she has been elevating it--pt states it is burning now     Cough   .     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    MRSA, Peptostreptococcus    NSAID induced gastritis 9/8/2015    Obesity     Parkinson disease 11/17/2015     Social History     Social History    Marital status:      Spouse name: N/A    Number of children: 3    Years of education: N/A     Occupational History    Homemaker      Social History Main Topics    Smoking status: Never Smoker    Smokeless tobacco: Never Used    Alcohol use 0.0 oz/week      Comment: occassionally, 2 beers for football games    Drug use: No    Sexual activity: Not on file     Other Topics Concern    Not on file     Social History Narrative    Living with son and his family here in [facility]. Recently  (06/17)     Past Surgical History:   Procedure Laterality Date    ABCESS DRAINAGE Left 8/6/2015    left knee washout    CARDIAC SURGERY  4/7/2010    artery on wrong side, performed in [place]    CHOLECYSTECTOMY  3/2010    HYSTERECTOMY      No cervix    PERIPHERALLY INSERTED CENTRAL CATHETER INSERTION Right 8/6/2015    shoulder cyst removal Left 2014    [facility]    TONSILLECTOMY      TOTAL KNEE ARTHROPLASTY Bilateral right 6/2014, left 9/30/2014    TOTAL KNEE ARTHROPLASTY Left 7/21/2015    revision     Family History   Problem  "Relation Age of Onset    Bladder Cancer Father     Heart disease Father         heart attack    Diabetes Father     Hypertension Father     Pulmonary embolism Mother            Review of Systems  General ROS: negative for chills, fever or weight loss  ENT ROS: negative for epistaxis, sore throat or rhinorrhea  Respiratory ROS: no cough, shortness of breath, or wheezing  Cardiovascular ROS: no chest pain or dyspnea on exertion  Gastrointestinal ROS: no abdominal pain, change in bowel habits, or black/ bloody stools    Physical Exam:  /68   Pulse 75   Temp 98 °F (36.7 °C) (Oral)   Resp 18   Ht 5' 10" (1.778 m)   Wt (!) 151.1 kg (333 lb 1.8 oz)   SpO2 96%   BMI 47.80 kg/m²   General appearance: alert, cooperative, no distress  Constitutional:Oriented to person, place, and time.appears well-developed and well-nourished.  HEENT: Normocephalic, atraumatic, neck symmetrical, no nasal discharge, TM- clear bilaterally  Lungs: clear to auscultation bilaterally, no dullness to percussion bilaterally  Heart: regular rate and rhythm without rub; no displacement of the PMI , S1&S2 present  Extrem: left leg edematous, erythema half way up the pretibial with seeping serosang fluid , pos tenderness of calf  Physical Exam    LABS:    Complete Blood Count  Lab Results   Component Value Date    RBC 4.25 05/30/2018    HGB 13.6 05/30/2018    HCT 41.0 05/30/2018    MCV 97 05/30/2018    MCH 32.0 (H) 05/30/2018    MCHC 33.2 05/30/2018    RDW 13.3 05/30/2018     05/30/2018    MPV 11.3 05/30/2018    GRAN 3.9 05/30/2018    GRAN 40.0 05/30/2018    LYMPH 4.3 05/30/2018    LYMPH 44.0 05/30/2018    MONO 1.0 05/30/2018    MONO 9.7 05/30/2018    EOS 0.5 05/30/2018    BASO 0.08 05/30/2018    EOSINOPHIL 5.5 05/30/2018    BASOPHIL 0.8 05/30/2018    DIFFMETHOD Automated 05/30/2018       Comprehensive Metabolic Panel  Lab Results   Component Value Date    GLU 78 05/30/2018    BUN 13 05/30/2018    CREATININE 0.59 05/30/2018    NA " 141 05/30/2018    K 3.5 05/30/2018     05/30/2018    PROT 7.7 05/30/2018    ALBUMIN 3.6 05/30/2018    BILITOT 0.5 05/30/2018    AST 75 (H) 05/30/2018    ALKPHOS 123 05/30/2018    CO2 25 05/30/2018     (H) 05/30/2018    ANIONGAP 9 05/30/2018    EGFRNONAA >60.0 05/30/2018    ESTGFRAFRICA >60.0 05/30/2018       LIPID  Lab Results   Component Value Date    CHOL 105 (L) 01/16/2016    HDL 25 01/16/2016         TSH  Lab Results   Component Value Date    TSH 1.210 05/02/2018       Current Outpatient Prescriptions   Medication Sig Dispense Refill    albuterol 90 mcg/actuation inhaler Inhale 2 puffs into the lungs every 6 (six) hours as needed for Wheezing. Rescue 18 g 1    buprenorphine 5 mcg/hour weekly patch APPLY 1 PATCH ONTO THE SKIN EVERY WEEK FOR 30 DAYS  0    carbidopa-levodopa  mg (SINEMET)  mg per tablet Take 1 tablet by mouth 3 (three) times daily.      diltiazem (CARDIZEM CD) 240 MG 24 hr capsule Take 1 capsule (240 mg total) by mouth once daily. 30 capsule 11    flecainide (TAMBOCOR) 50 MG Tab Take 100 mg by mouth 2 (two) times daily.      gabapentin (NEURONTIN) 300 MG capsule Take 1 capsule (300 mg total) by mouth 3 (three) times daily. 90 capsule 2    gabapentin (NEURONTIN) 600 MG tablet TAKE 1 TABLET BY MOUTH 6 TIMES A DAY  0    levothyroxine (SYNTHROID) 25 MCG tablet Take 1 tablet (25 mcg total) by mouth once daily. 10 tablet 0    losartan (COZAAR) 100 MG tablet Take 1 tablet (100 mg total) by mouth once daily. 30 tablet 11    mirtazapine (REMERON) 30 MG tablet Take 1 tablet(s) every day by oral route.  0    propranolol (INDERAL LA) 60 MG 24 hr capsule Take 1 capsule (60 mg total) by mouth once daily. 10 capsule 0    venlafaxine (EFFEXOR-XR) 150 MG Cp24 Take 1 capsule (150 mg total) by mouth once daily. 10 capsule 0    venlafaxine 75 mg TR24 Take by mouth.      clindamycin (CLEOCIN) 300 MG capsule Take 1 capsule (300 mg total) by mouth Daily. 10 capsule 0     No current  facility-administered medications for this visit.        Assessment:    ICD-10-CM ICD-9-CM    1. Cellulitis of left lower extremity L03.116 682.6 CAR Ultrasound doppler venous leg left         2. Leg edema, left R60.0 782.3 CAR Ultrasound doppler venous leg left         3. Essential hypertension I10 401.9          Plan:  F/u 1 week  Follow-up in 1 week (on 6/22/2018).          Marge Davila MD

## 2018-06-26 DIAGNOSIS — M51.36 DDD (DEGENERATIVE DISC DISEASE), LUMBAR: ICD-10-CM

## 2018-06-26 DIAGNOSIS — M47.26 OTHER SPONDYLOSIS WITH RADICULOPATHY, LUMBAR REGION: Primary | ICD-10-CM

## 2018-06-26 DIAGNOSIS — M47.896 OTHER SPONDYLOSIS, LUMBAR REGION: ICD-10-CM

## 2018-06-26 DIAGNOSIS — M47.816 FACET ARTHROPATHY, LUMBAR: ICD-10-CM

## 2018-07-11 ENCOUNTER — OFFICE VISIT (OUTPATIENT)
Dept: GASTROENTEROLOGY | Facility: CLINIC | Age: 53
End: 2018-07-11
Payer: MEDICAID

## 2018-07-11 ENCOUNTER — TELEPHONE (OUTPATIENT)
Dept: GASTROENTEROLOGY | Facility: CLINIC | Age: 53
End: 2018-07-11

## 2018-07-11 ENCOUNTER — APPOINTMENT (OUTPATIENT)
Dept: LAB | Facility: HOSPITAL | Age: 53
End: 2018-07-11
Attending: PATHOLOGY
Payer: MEDICAID

## 2018-07-11 VITALS — HEIGHT: 71 IN | SYSTOLIC BLOOD PRESSURE: 120 MMHG | DIASTOLIC BLOOD PRESSURE: 68 MMHG

## 2018-07-11 DIAGNOSIS — R10.13 ABDOMINAL PAIN, EPIGASTRIC: ICD-10-CM

## 2018-07-11 DIAGNOSIS — Z80.0 FAMILY HISTORY OF COLON CANCER: ICD-10-CM

## 2018-07-11 DIAGNOSIS — K74.60 HEPATIC CIRRHOSIS, UNSPECIFIED HEPATIC CIRRHOSIS TYPE, UNSPECIFIED WHETHER ASCITES PRESENT: ICD-10-CM

## 2018-07-11 DIAGNOSIS — K22.70 BARRETT'S ESOPHAGUS WITHOUT DYSPLASIA: ICD-10-CM

## 2018-07-11 DIAGNOSIS — K21.00 GASTROESOPHAGEAL REFLUX DISEASE WITH ESOPHAGITIS: Chronic | ICD-10-CM

## 2018-07-11 DIAGNOSIS — R93.2 ABNORMAL LIVER CT: ICD-10-CM

## 2018-07-11 DIAGNOSIS — B18.2 CHRONIC HEPATITIS C WITHOUT HEPATIC COMA: Primary | ICD-10-CM

## 2018-07-11 DIAGNOSIS — R11.0 NAUSEA: ICD-10-CM

## 2018-07-11 DIAGNOSIS — Z12.11 COLON CANCER SCREENING: ICD-10-CM

## 2018-07-11 PROCEDURE — 99204 OFFICE O/P NEW MOD 45 MIN: CPT | Mod: S$PBB,,, | Performed by: NURSE PRACTITIONER

## 2018-07-11 PROCEDURE — 99214 OFFICE O/P EST MOD 30 MIN: CPT | Mod: PBBFAC,PN | Performed by: NURSE PRACTITIONER

## 2018-07-11 PROCEDURE — 99999 PR PBB SHADOW E&M-EST. PATIENT-LVL IV: CPT | Mod: PBBFAC,,, | Performed by: NURSE PRACTITIONER

## 2018-07-11 RX ORDER — PANTOPRAZOLE SODIUM 40 MG/1
40 TABLET, DELAYED RELEASE ORAL DAILY
Qty: 30 TABLET | Refills: 3 | Status: SHIPPED | OUTPATIENT
Start: 2018-07-11 | End: 2018-08-31

## 2018-07-11 RX ORDER — OXYCODONE HYDROCHLORIDE 5 MG/1
5 CAPSULE ORAL EVERY 8 HOURS PRN
Status: ON HOLD | COMMUNITY
End: 2018-07-17

## 2018-07-11 RX ORDER — POLYETHYLENE GLYCOL 3350, SODIUM SULFATE ANHYDROUS, SODIUM BICARBONATE, SODIUM CHLORIDE, POTASSIUM CHLORIDE 236; 22.74; 6.74; 5.86; 2.97 G/4L; G/4L; G/4L; G/4L; G/4L
4 POWDER, FOR SOLUTION ORAL SEE ADMIN INSTRUCTIONS
Qty: 4000 ML | Refills: 0 | Status: ON HOLD | OUTPATIENT
Start: 2018-07-11 | End: 2018-07-27 | Stop reason: ALTCHOICE

## 2018-07-11 NOTE — PROGRESS NOTES
"Subjective:       Patient ID: Danika Voss is a 52 y.o. female.    Chief Complaint: Hepatitis C    HPI Reports diagnosis of hepatitis C ten years ago.  She has no history of IV drug use or blood transfusion.  No tattoos.  Has sons who have Hepatitis C from IV drug use.  She has never been treated.    Recent CT scan with evidence of cirrhosis ( has never been told previously that she had cirrhosis):  No evidence of urolithiasis or obstructive uropathy.    Nodular contour to the liver suggestive of cirrhosis.  Splenomegaly.    Prior cholecystectomy and hysterectomy.    Mild colonic diverticulosis.    Nonspecific soft tissue stranding along the anterior abdominal wall, left more than right.    LFT normal on recent labs.  Platelets slightly low.  Has chronic knee pain and has been on large doses of oxycodone which is now being weaned.  Denies alcohol use.  She reports a new diagnosis of Parkinson's.    Has history of Schafer's esophagus and EGD 3 years ago with reported "mild Schafer's".  Report is in media but I do not see any pathology.  She has chronic acid reflux that is occurring daily.  She is using TUMS for relief.  Has not been on any PPI.  She reports chronic nausea and upper abdominal pain.  Denies dysphagia.  Has had black stool that "comes and goes".  Denies red blood with bowel movements.  Reports regular bowel habit.  Had colonoscopy over ten years ago.  No polyps.  Father with colon cancer.   She has recently moved to this area to live with her son who is a  with the [facility]'s department.    Review of Systems   Constitutional: Negative.  Negative for activity change, appetite change, fatigue, fever and unexpected weight change.   HENT: Negative.  Negative for sore throat and trouble swallowing.    Respiratory: Negative.  Negative for cough, choking and shortness of breath.    Cardiovascular: Negative.  Negative for chest pain.   Gastrointestinal: Positive for abdominal pain and nausea. Negative " for blood in stool, constipation, diarrhea and vomiting.   Genitourinary: Negative.  Negative for difficulty urinating, dysuria and hematuria.   Musculoskeletal: Positive for arthralgias. Negative for neck pain and neck stiffness.   Skin: Negative.    Neurological: Negative.  Negative for dizziness, syncope, weakness and light-headedness.   Psychiatric/Behavioral: Negative.        Objective:      Physical Exam   Constitutional: She is oriented to person, place, and time. She appears well-developed and well-nourished. No distress.   HENT:   Head: Normocephalic.   Eyes: No scleral icterus.   Neck: Neck supple.   Cardiovascular: Normal rate.    Pulmonary/Chest: Effort normal. No respiratory distress.   Abdominal: Soft. She exhibits no distension. There is no tenderness.   Neurological: She is alert and oriented to person, place, and time.   Skin: Skin is warm and dry. She is not diaphoretic.   Psychiatric: She has a normal mood and affect. Her behavior is normal. Judgment and thought content normal.   Vitals reviewed.      Assessment:       1. Chronic hepatitis C without hepatic coma    2. Hepatic cirrhosis, unspecified hepatic cirrhosis type, unspecified whether ascites present    3. Gastroesophageal reflux disease with esophagitis    4. Nausea    5. Abdominal pain, epigastric    6. Schafer's esophagus without dysplasia    7. Family history of colon cancer    8. Colon cancer screening    9. Abnormal liver CT        Plan:         Danika was seen today for hepatitis c.    Diagnoses and all orders for this visit:    Chronic hepatitis C without hepatic coma  -     CBC auto differential; Future  -     Comprehensive metabolic panel; Future  -     AFP tumor marker; Future  -     Protime-INR; Future  -     HCV FIBROSURE; Future  -     Hepatitis panel, acute; Future  -     Hepatitis C genotype; Future  -     HEPATITIS C RNA, QUANTITATIVE, PCR; Future  -     Ambulatory Referral to Hepatology    Hepatic cirrhosis, unspecified  hepatic cirrhosis type, unspecified whether ascites present  -     CBC auto differential; Future  -     Comprehensive metabolic panel; Future  -     AFP tumor marker; Future  -     Protime-INR; Future  -     HCV FIBROSURE; Future  -     Hepatitis panel, acute; Future  -     Hepatitis C genotype; Future  -     HEPATITIS C RNA, QUANTITATIVE, PCR; Future  -     Case request GI: ESOPHAGOGASTRODUODENOSCOPY (EGD)  -     Ambulatory Referral to Hepatology    Gastroesophageal reflux disease with esophagitis  -     Case request GI: ESOPHAGOGASTRODUODENOSCOPY (EGD)    Nausea  -     Case request GI: ESOPHAGOGASTRODUODENOSCOPY (EGD)    Abdominal pain, epigastric  -     Case request GI: ESOPHAGOGASTRODUODENOSCOPY (EGD)    Schafer's esophagus without dysplasia  -     Case request GI: ESOPHAGOGASTRODUODENOSCOPY (EGD)    Family history of colon cancer  -     Case request GI: COLONOSCOPY    Colon cancer screening  -     Case request GI: COLONOSCOPY    Abnormal liver CT  -     Ambulatory Referral to Hepatology    Other orders  -     Cancel: Case request GI: ESOPHAGOGASTRODUODENOSCOPY (EGD)  -     Cancel: Case request GI: COLONOSCOPY Golytely  -     polyethylene glycol (GOLYTELY,NULYTELY) 236-22.74-6.74 -5.86 gram suspension; Take 4,000 mLs (4 L total) by mouth As instructed.  -     pantoprazole (PROTONIX) 40 MG tablet; Take 1 tablet (40 mg total) by mouth once daily.    Will schedule EGD for variceal screening and Schafer's surveillance as well as complaints of constant GERD, nausea and abdominal pain.  Begin PPI.    Colonoscopy for colon cancer screening.    We have discussed hepatitis C and transmission.  Briefly have discussed treatment of hepatitis C  I have reviewed imaging with patient and concern for cirrhosis.  Will refer to hepatology for hepatitis C treatment and cirrhosis evaluation.

## 2018-07-11 NOTE — LETTER
July 11, 2018      Marge Davila MD  [address] [address] [facility] [address]  [address]  [facility] [address]           [facility]  [address] , [address]   [facility] [address]  Phone: [phone]  Fax: [phone]          Patient: Danika Voss   MR Number: [id number]   YOB: 1965   Date of Visit: 7/11/2018       Dear Dr. Marge Davila:    Thank you for referring Danika Voss to me for evaluation. Attached you will find relevant portions of my assessment and plan of care.    If you have questions, please do not hesitate to call me. I look forward to following Danika Voss along with you.    Sincerely,    Anisha Houston, [facility]    Enclosure  CC:  No Recipients    If you would like to receive this communication electronically, please contact [email or web address] or [phone] to request more information on [employer] access.    For providers and/or their staff who would like to refer a patient to [facility], please contact us through our one-stop-shop provider referral line, [facility], at [phone].    If you feel you have received this communication in error or would no longer like to receive these types of communications, please e-mail [email or web address]

## 2018-07-13 ENCOUNTER — DOCUMENTATION ONLY (OUTPATIENT)
Dept: TRANSPLANT | Facility: CLINIC | Age: 53
End: 2018-07-13

## 2018-07-13 NOTE — LETTER
July 13, 2018    Dan[place] [id number]      Dear Danika Voss:    Your doctor has referred you to the [facility]. We are sending this letter as a reminder for you to make an appointment with us to complete the referral process.   Please call us at your earliest convenience at [phone] to schedule your appointment.  We look forward to seeing you soon.  If you have already been scheduled, please disregard this letter.      Sincerely,        [facility]   [address] [place] [id number]  [phone]

## 2018-07-13 NOTE — NURSING
Pt records reviewed.   Pt will be referred to Hepatology.    Initial referral received  from the [facility]que.   Referring Provider/diagnosis  YASMIN POTTS Provider:   Diagnosis: Chronic hepatitis C without hepatic coma  Hepatic cirrhosis, unspecified hepatic cirrhosis type, unspecified whether ascites present  Abnormal liver CT           Referral letter sent to provider and patient.

## 2018-07-16 ENCOUNTER — TELEPHONE (OUTPATIENT)
Dept: GASTROENTEROLOGY | Facility: CLINIC | Age: 53
End: 2018-07-16

## 2018-07-16 ENCOUNTER — TELEPHONE (OUTPATIENT)
Dept: HEPATOLOGY | Facility: CLINIC | Age: 53
End: 2018-07-16

## 2018-07-16 DIAGNOSIS — E03.4 HYPOTHYROIDISM DUE TO ACQUIRED ATROPHY OF THYROID: Chronic | ICD-10-CM

## 2018-07-16 RX ORDER — LEVOTHYROXINE SODIUM 100 UG/1
25 CAPSULE ORAL DAILY
OUTPATIENT
Start: 2018-07-16

## 2018-07-16 RX ORDER — METHOCARBAMOL 500 MG/1
TABLET, FILM COATED ORAL
Refills: 0 | COMMUNITY
Start: 2018-06-28 | End: 2018-08-29

## 2018-07-16 RX ORDER — ZOLPIDEM TARTRATE 5 MG/1
5 TABLET ORAL NIGHTLY
Refills: 1 | Status: ON HOLD | COMMUNITY
Start: 2018-07-02 | End: 2018-07-17 | Stop reason: HOSPADM

## 2018-07-16 RX ORDER — OXYCODONE AND ACETAMINOPHEN 5; 325 MG/1; MG/1
TABLET ORAL
Refills: 0 | COMMUNITY
Start: 2018-07-09 | End: 2018-10-15 | Stop reason: SDUPTHER

## 2018-07-16 RX ORDER — LEVOTHYROXINE SODIUM 25 UG/1
25 TABLET ORAL DAILY
Qty: 10 TABLET | Refills: 0 | OUTPATIENT
Start: 2018-07-16

## 2018-07-16 NOTE — TELEPHONE ENCOUNTER
Christ,  According to patient records - patient has not been prescribed this medication since Jan. 2016 and only for 10 tablets by Dr. Esteves.  Please call pharmacy and confirm that this was last time medication filled and what doctor prescribed.

## 2018-07-16 NOTE — TELEPHONE ENCOUNTER
----- Message from Monse Quinones sent at 7/16/2018 11:54 AM CDT -----  Contact: [phone]/self  Patient requesting to speak with you regarding instructions for his upcoming procedure. Please call and advise.

## 2018-07-16 NOTE — TELEPHONE ENCOUNTER
Advise patient that I am sorry - but Dr. Davila is out of town and I have no record of patient being on this medication since 2016 and not on that dose and she has been following Dr. Davila since February 2018 - so I will not fill this medication.  She will have to wait until Dr. Davila returns next week to discuss.  Then forward this message to Dr. Davila.

## 2018-07-16 NOTE — TELEPHONE ENCOUNTER
----- Message from Amalia Agarwal sent at 7/16/2018 10:26 AM CDT -----  Contact: Self/ [phone]  Patient called in to get prescription refill. Please call and advise.     levothyroxine (SYNTHROID) 25 MCG     [facility] - [address] - [address] [facility] [address]

## 2018-07-16 NOTE — TELEPHONE ENCOUNTER
----- Message from Beth Iraheta sent at 7/16/2018  9:47 AM CDT -----  Patient Requesting Sooner Appointment.     Reason for sooner appt.:  When is the first available appointment?  Communication Preference: [phone]  Additional Information:  Pt needs appt for Hep C

## 2018-07-16 NOTE — TELEPHONE ENCOUNTER
Patient states her levothyroxine is now 100mcg and she was receiving it from a doctor in [place].

## 2018-07-17 PROBLEM — K22.70 BARRETT'S ESOPHAGUS: Status: ACTIVE | Noted: 2018-07-17

## 2018-07-18 ENCOUNTER — TELEPHONE (OUTPATIENT)
Dept: GASTROENTEROLOGY | Facility: CLINIC | Age: 53
End: 2018-07-18

## 2018-07-18 NOTE — TELEPHONE ENCOUNTER
----- Message from JERMAIN Bond sent at 7/18/2018  3:36 PM CDT -----  Let her know that she does have hepatitis C as we suspected.  She has type 3.  Her labs suggest minimal fibrosis or damage to the liver but does show active inflammation.  She should keep her appt for July 30 with hepatology at [facility] to discuss treatment.

## 2018-07-19 ENCOUNTER — TELEPHONE (OUTPATIENT)
Dept: FAMILY MEDICINE | Facility: CLINIC | Age: 53
End: 2018-07-19

## 2018-07-19 DIAGNOSIS — E03.4 HYPOTHYROIDISM DUE TO ACQUIRED ATROPHY OF THYROID: Chronic | ICD-10-CM

## 2018-07-19 RX ORDER — LEVOTHYROXINE SODIUM 25 UG/1
25 TABLET ORAL DAILY
Qty: 30 TABLET | Refills: 0 | Status: SHIPPED | OUTPATIENT
Start: 2018-07-19 | End: 2018-08-14 | Stop reason: SDUPTHER

## 2018-07-19 NOTE — TELEPHONE ENCOUNTER
Prescription for Levothyroxine 100 mcg has been filled for 1 month. Please call pt to schedule an appt to discuss continuation of this prescription with Dr. Davila.

## 2018-07-19 NOTE — TELEPHONE ENCOUNTER
----- Message from Tamy Barrientos sent at 7/19/2018  9:55 AM CDT -----  Contact: [phone]  Patient is requesting a refill on the below. Please advise        levothyroxine       Pharmacy     [facility] - [place] - [address]

## 2018-07-25 ENCOUNTER — TELEPHONE (OUTPATIENT)
Dept: GASTROENTEROLOGY | Facility: CLINIC | Age: 53
End: 2018-07-25

## 2018-07-25 NOTE — TELEPHONE ENCOUNTER
----- Message from Teresa Sena MD sent at 7/23/2018  7:58 AM CDT -----  No evidence of Schafer's on biopsies, and the endoscopic evaluation showed only small patches of possible Schafer's .  HP negative as well.  No signs of portal hypertension, repeat EGD in 2 years.

## 2018-07-27 PROBLEM — Z80.0 FAMILY HISTORY OF COLON CANCER: Status: ACTIVE | Noted: 2018-07-27

## 2018-07-27 PROBLEM — Z12.11 SCREEN FOR COLON CANCER: Status: ACTIVE | Noted: 2018-07-27

## 2018-08-14 DIAGNOSIS — E03.4 HYPOTHYROIDISM DUE TO ACQUIRED ATROPHY OF THYROID: Chronic | ICD-10-CM

## 2018-08-15 RX ORDER — ZOLPIDEM TARTRATE 5 MG/1
TABLET ORAL
Qty: 30 TABLET | Refills: 2 | Status: SHIPPED | OUTPATIENT
Start: 2018-08-15 | End: 2018-08-29

## 2018-08-15 RX ORDER — LEVOTHYROXINE SODIUM 100 UG/1
TABLET ORAL
Qty: 30 TABLET | Refills: 2 | Status: SHIPPED | OUTPATIENT
Start: 2018-08-15 | End: 2018-10-29 | Stop reason: SDUPTHER

## 2018-08-29 ENCOUNTER — TELEPHONE (OUTPATIENT)
Dept: FAMILY MEDICINE | Facility: CLINIC | Age: 53
End: 2018-08-29

## 2018-08-29 ENCOUNTER — OFFICE VISIT (OUTPATIENT)
Dept: FAMILY MEDICINE | Facility: CLINIC | Age: 53
End: 2018-08-29
Payer: MEDICAID

## 2018-08-29 VITALS
WEIGHT: 293 LBS | SYSTOLIC BLOOD PRESSURE: 130 MMHG | TEMPERATURE: 98 F | OXYGEN SATURATION: 96 % | RESPIRATION RATE: 16 BRPM | HEART RATE: 92 BPM | BODY MASS INDEX: 41.02 KG/M2 | HEIGHT: 71 IN | DIASTOLIC BLOOD PRESSURE: 80 MMHG

## 2018-08-29 DIAGNOSIS — L02.214 ABSCESS OF GROIN, RIGHT: ICD-10-CM

## 2018-08-29 PROCEDURE — 10060 I&D ABSCESS SIMPLE/SINGLE: CPT | Mod: S$PBB,,, | Performed by: FAMILY MEDICINE

## 2018-08-29 PROCEDURE — 99999 PR PBB SHADOW E&M-EST. PATIENT-LVL III: CPT | Mod: PBBFAC,,, | Performed by: FAMILY MEDICINE

## 2018-08-29 PROCEDURE — 10060 I&D ABSCESS SIMPLE/SINGLE: CPT | Mod: PBBFAC,PN | Performed by: FAMILY MEDICINE

## 2018-08-29 PROCEDURE — 99213 OFFICE O/P EST LOW 20 MIN: CPT | Mod: S$PBB,25,, | Performed by: FAMILY MEDICINE

## 2018-08-29 PROCEDURE — 99213 OFFICE O/P EST LOW 20 MIN: CPT | Mod: PBBFAC,PN,25 | Performed by: FAMILY MEDICINE

## 2018-08-29 RX ORDER — DOXYCYCLINE 100 MG/1
100 CAPSULE ORAL 2 TIMES DAILY
Qty: 14 CAPSULE | Refills: 0 | Status: SHIPPED | OUTPATIENT
Start: 2018-08-29 | End: 2018-09-05

## 2018-08-29 NOTE — ASSESSMENT & PLAN NOTE
- s/p I&D packing placed  - rec doxycycline 100 mg BID x 7 day  - RTC for dressing change  - wound care instruction given

## 2018-08-29 NOTE — PROGRESS NOTES
"FAMILY MEDICINE    Patient Active Problem List   Diagnosis    Essential hypertension    Morbid obesity with BMI of 40.0-44.9, adult    Major depressive disorder, recurrent episode, moderate    Hypothyroidism due to acquired atrophy of thyroid    Chronic hepatitis C without hepatic coma    Painful total knee replacement    Gastroesophageal reflux disease with esophagitis    Parkinson disease    Familial tremor    Opioid dependence    Chronic diastolic heart failure    Chronic pain disorder    Schafer's esophagus    Family history of colon cancer    Abscess of groin, right       CC:   Chief Complaint   Patient presents with    Cyst     lower abdomen cyst memo 2 days ago smelly with bleeding with pain       SUBJECTIVE:  Danika Voss   is a 52 y.o. female  - with HTN, anxiety/depression and chronic pain presents today s/p 2 day h/o of boil right groin area and reports that yesterday it "burst" with +foul odor drainage and bleeding. Pain is throbbing in the area 6/10. No fevers. No prior similar issues. No pelvic pain, vaginal bleeding (h/o hysterectomy), dysuria, or unintended weight loss.         ROS: Review of Systems   Constitutional: Negative for appetite change, chills, fatigue, fever and unexpected weight change.   Respiratory: Negative for cough, chest tightness and shortness of breath.    Cardiovascular: Negative for chest pain.   Gastrointestinal: Negative for abdominal pain, constipation, diarrhea and vomiting.   Genitourinary: Negative for decreased urine volume, difficulty urinating, dyspareunia, dysuria, flank pain, frequency, genital sores, hematuria, pelvic pain, urgency, vaginal bleeding and vaginal pain.   Musculoskeletal: Negative for myalgias.   Skin: Positive for rash.   Neurological: Negative for dizziness, facial asymmetry, weakness, light-headedness and numbness.   Psychiatric/Behavioral: The patient is nervous/anxious.        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    TONSILLECTOMY      TOTAL KNEE ARTHROPLASTY Bilateral right 6/2014, left 9/30/2014    TOTAL KNEE ARTHROPLASTY Left 7/21/2015    revision       ALLERGIES:   Review of patient's allergies indicates:   Allergen Reactions    Fentanyl Other (See Comments)    Lisinopril Other (See Comments)     cough    Nsaids (non-steroidal anti-inflammatory drug) Other (See Comments)     gastritis    Toradol [ketorolac] Nausea And Vomiting and Rash       MEDS:   Current Outpatient Medications:     albuterol 90 mcg/actuation inhaler, Inhale 2 puffs into the lungs every 6 (six) hours as needed for Wheezing. Rescue, Disp: 18 g, Rfl: 1    diltiazem (CARDIZEM CD) 240 MG 24 hr capsule, Take 1 capsule (240 mg total) by mouth once daily., Disp: 30 capsule, Rfl: 11    flecainide (TAMBOCOR) 50 MG Tab, Take 100 mg by mouth 2 (two) times daily., Disp: , Rfl:     gabapentin (NEURONTIN) 600 MG tablet, TAKE 1 TABLET BY MOUTH 6 TIMES A DAY, Disp: , Rfl: 0    levothyroxine (SYNTHROID) 100 MCG tablet, Take 1 tablet by mouth once daily., Disp: 30 tablet, Rfl: 2    mirtazapine (REMERON) 30 MG tablet, Take 1 tablet(s) every day by oral route., Disp: , Rfl: 0    oxyCODONE-acetaminophen  "(PERCOCET) 5-325 mg per tablet, TAKE 1 TABLET BY MOUTH THREE TIMES A DAY FOR 30 DAYS, Disp: , Rfl: 0    pantoprazole (PROTONIX) 40 MG tablet, Take 1 tablet (40 mg total) by mouth once daily., Disp: 30 tablet, Rfl: 3    venlafaxine (EFFEXOR-XR) 150 MG Cp24, Take 1 capsule (150 mg total) by mouth once daily., Disp: 10 capsule, Rfl: 0    venlafaxine 75 mg TR24, Take by mouth once daily. , Disp: , Rfl:     doxycycline (VIBRAMYCIN) 100 MG Cap, Take 1 capsule (100 mg total) by mouth 2 (two) times daily. for 7 days, Disp: 14 capsule, Rfl: 0    OBJECTIVE:   Vitals:    08/29/18 1321   BP: 130/80   BP Location: Left arm   Patient Position: Sitting   BP Method: Large (Manual)   Pulse: 92   Resp: 16   Temp: 98.4 °F (36.9 °C)   TempSrc: Oral   SpO2: 96%   Weight: (!) 145.5 kg (320 lb 12.8 oz)   Height: 5' 10.5" (1.791 m)       Physical Exam   Constitutional: No distress.   Neck: Neck supple.   Cardiovascular: Normal rate, regular rhythm, normal heart sounds and intact distal pulses.   No murmur heard.  Pulmonary/Chest: Effort normal and breath sounds normal.   Abdominal:       Neurological: She is alert.       ASSESSMENT:  Problem List Items Addressed This Visit     Abscess of groin, right    Current Assessment & Plan     - s/p I&D packing placed  - rec doxycycline 100 mg BID x 7 day  - RTC for dressing change  - wound care instruction given         Relevant Medications    doxycycline (VIBRAMYCIN) 100 MG Cap          PLAN:   Orders Placed This Encounter    doxycycline (VIBRAMYCIN) 100 MG Cap     Follow-up with Dr. Lewis in 2-3 days for dressing change.     Dr. Rosario Lewis D.O.   Family Medicine    PROCEDURE NOTE    PROCEDURE: I&D right groin abscess    CONSENT: Risks and benefits of procedure were discussed with the patient and questions illicited and answered. Pt consented verbally.    INDICATION: abscess    DESCRIPTION: Pt placed in supine position and area draped. Right groin abscess prepped with betadine and alcohol swab. " Skin surrounding the area was anesthetized with 2% lidocaine with epi about 1 mL used. Incision made into the center of lesion with +prurulent bloody drainage. Incision about 0.6 cm and wound explored for any pocketing. No tracking. Depth of wound 0.5 cm. Hemostatis achieved with pressure and packing placed. Wound covered with 2x2 and papertape. Area cleaned.     ASSISTANT: Tessie Brady    COMPLICATIONS: none    EBL: <0.5 mL    DISPO: Pt awake and alert. No issues. Rec keep area covered and RTC 2 days for dressing and packing change.     Follow-up 2-3 days    Dr. Rosario Lewis D.O.   [facility]

## 2018-08-29 NOTE — TELEPHONE ENCOUNTER
----- Message from Norma George sent at 8/29/2018  7:17 AM CDT -----  Contact: self [phone]  Patient would like to be seen today. Please call and advise.

## 2018-08-31 ENCOUNTER — OFFICE VISIT (OUTPATIENT)
Dept: FAMILY MEDICINE | Facility: CLINIC | Age: 53
End: 2018-08-31
Payer: MEDICAID

## 2018-08-31 VITALS
WEIGHT: 293 LBS | OXYGEN SATURATION: 97 % | SYSTOLIC BLOOD PRESSURE: 150 MMHG | DIASTOLIC BLOOD PRESSURE: 104 MMHG | TEMPERATURE: 99 F | HEART RATE: 90 BPM | BODY MASS INDEX: 41.02 KG/M2 | HEIGHT: 71 IN

## 2018-08-31 DIAGNOSIS — I10 ESSENTIAL HYPERTENSION: Chronic | ICD-10-CM

## 2018-08-31 DIAGNOSIS — S31.109D: ICD-10-CM

## 2018-08-31 DIAGNOSIS — L02.214 ABSCESS OF GROIN, RIGHT: Primary | ICD-10-CM

## 2018-08-31 PROBLEM — S31.109A OPEN WOUND OF GROIN WITHOUT COMPLICATION: Status: ACTIVE | Noted: 2018-08-31

## 2018-08-31 PROCEDURE — 99024 POSTOP FOLLOW-UP VISIT: CPT | Mod: S$PBB,,, | Performed by: FAMILY MEDICINE

## 2018-08-31 PROCEDURE — 99214 OFFICE O/P EST MOD 30 MIN: CPT | Mod: PBBFAC,PN | Performed by: FAMILY MEDICINE

## 2018-08-31 PROCEDURE — 99999 PR PBB SHADOW E&M-EST. PATIENT-LVL IV: CPT | Mod: PBBFAC,,, | Performed by: FAMILY MEDICINE

## 2018-08-31 NOTE — ASSESSMENT & PLAN NOTE
- prurulent drainge resolved  - healing well  - depth decrease and no longer needs packing  - complete abx

## 2018-08-31 NOTE — PATIENT INSTRUCTIONS
1. Okay to removed dressing tomorrow evening with bath  2. Wash area with warm water and soap and rinse. Pat dry. Do no scrub  3. Place Medipore dressing to cover area until fully heals  4. Follow-up with PCP in 1-2 weeks

## 2018-08-31 NOTE — ASSESSMENT & PLAN NOTE
- healing well with decrease in depth to 0.3 cm  - packing removed and no new packing placed  - close wound with steristrips  - cover with Medipore dressing  - wound care instructions  - f/u 2 weeks for wound check or if any concerns

## 2018-08-31 NOTE — PROGRESS NOTES
FAMILY MEDICINE    Patient Active Problem List   Diagnosis    Essential hypertension    Morbid obesity with BMI of 40.0-44.9, adult    Major depressive disorder, recurrent episode, moderate    Hypothyroidism due to acquired atrophy of thyroid    Chronic hepatitis C without hepatic coma    Painful total knee replacement    Gastroesophageal reflux disease with esophagitis    Parkinson disease    Familial tremor    Opioid dependence    Chronic diastolic heart failure    Chronic pain disorder    Schafer's esophagus    Family history of colon cancer    Abscess of groin, right    Open wound of groin without complication       CC:   Chief Complaint   Patient presents with    Follow-up       SUBJECTIVE:  Danika Voss   is a 52 y.o. female  - here for follow-up wound and packing change from I&D right groin abscess 8/29/18. Pt reports doing well without any pain at the site. Dressing has remained in place. Rash improved. No fevers or chills. Reports BP elevated 2/2 worsening acute on chronic diffuse pain and working with pain management.         ROS: Review of Systems   Constitutional: Negative for chills and fever.   Respiratory: Negative for chest tightness and shortness of breath.    Cardiovascular: Negative for chest pain, palpitations and leg swelling.   Gastrointestinal: Negative for abdominal pain, constipation, diarrhea, nausea and vomiting.   Genitourinary: Negative for difficulty urinating, flank pain, frequency, genital sores and urgency.   Musculoskeletal: Negative for myalgias.   Psychiatric/Behavioral: The patient is nervous/anxious.        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    MRSA, Peptostreptococcus    NSAID induced gastritis  9/8/2015    Obesity     Parkinson disease 11/17/2015       Past Surgical History:   Procedure Laterality Date    ABCESS DRAINAGE Left 8/6/2015    left knee washout    CARDIAC SURGERY  4/7/2010    artery on wrong side, performed in [place]    CHOLECYSTECTOMY  3/2010    HYSTERECTOMY      No cervix    PERIPHERALLY INSERTED CENTRAL CATHETER INSERTION Right 8/6/2015    shoulder cyst removal Left 2014    performed [facility]o at [facility]    TONSILLECTOMY      TOTAL KNEE ARTHROPLASTY Bilateral right 6/2014, left 9/30/2014    TOTAL KNEE ARTHROPLASTY Left 7/21/2015    revision       ALLERGIES:   Review of patient's allergies indicates:   Allergen Reactions    Fentanyl Other (See Comments)    Lisinopril Other (See Comments)     cough    Nsaids (non-steroidal anti-inflammatory drug) Other (See Comments)     gastritis    Toradol [ketorolac] Nausea And Vomiting and Rash       MEDS:   Current Outpatient Medications:     albuterol 90 mcg/actuation inhaler, Inhale 2 puffs into the lungs every 6 (six) hours as needed for Wheezing. Rescue, Disp: 18 g, Rfl: 1    diltiazem (CARDIZEM CD) 240 MG 24 hr capsule, Take 1 capsule (240 mg total) by mouth once daily., Disp: 30 capsule, Rfl: 11    doxycycline (VIBRAMYCIN) 100 MG Cap, Take 1 capsule (100 mg total) by mouth 2 (two) times daily. for 7 days, Disp: 14 capsule, Rfl: 0    flecainide (TAMBOCOR) 50 MG Tab, Take 100 mg by mouth 2 (two) times daily., Disp: , Rfl:     gabapentin (NEURONTIN) 600 MG tablet, TAKE 1 TABLET BY MOUTH 6 TIMES A DAY, Disp: , Rfl: 0    levothyroxine (SYNTHROID) 100 MCG tablet, Take 1 tablet by mouth once daily., Disp: 30 tablet, Rfl: 2    mirtazapine (REMERON) 30 MG tablet, Take 1 tablet(s) every day by oral route., Disp: , Rfl: 0    oxyCODONE-acetaminophen (PERCOCET) 5-325 mg per tablet, TAKE 1 TABLET BY MOUTH THREE TIMES A DAY FOR 30 DAYS, Disp: , Rfl: 0    venlafaxine (EFFEXOR-XR) 150 MG Cp24, Take 1 capsule (150 mg total)  "by mouth once daily., Disp: 10 capsule, Rfl: 0    venlafaxine 75 mg TR24, Take by mouth once daily. , Disp: , Rfl:     OBJECTIVE:   Vitals:    08/31/18 1010   BP: (!) 150/104   BP Location: Left arm   Patient Position: Sitting   BP Method: Large (Manual)   Pulse: 90   Temp: 98.6 °F (37 °C)   TempSrc: Oral   SpO2: 97%   Weight: (!) 145.5 kg (320 lb 12.3 oz)   Height: 5' 10.5" (1.791 m)       Physical Exam   Constitutional: No distress.   Cardiovascular: Normal rate.   Pulmonary/Chest: Breath sounds normal.   Skin:            ASSESSMENT/PLAN:  Problem List Items Addressed This Visit     Essential hypertension (Chronic)    Current Assessment & Plan     - poorly contrlled  - 2/2 pain?   - rec f/u with PCP         Abscess of groin, right - Primary    Current Assessment & Plan     - prurulent drainge resolved  - healing well  - depth decrease and no longer needs packing  - complete abx         Open wound of groin without complication    Current Assessment & Plan     - healing well with decrease in depth to 0.3 cm  - packing removed and no new packing placed  - close wound with steristrips  - cover with Medipore dressing  - wound care instructions  - f/u 2 weeks for wound check or if any concerns             Follow-up with Dr. Davila in 1-2 weeks.     Dr. Rosario Lewis D.O.   Family Medicine    "

## 2018-09-18 ENCOUNTER — OFFICE VISIT (OUTPATIENT)
Dept: FAMILY MEDICINE | Facility: CLINIC | Age: 53
End: 2018-09-18
Payer: MEDICAID

## 2018-09-18 VITALS
HEART RATE: 92 BPM | WEIGHT: 293 LBS | HEIGHT: 71 IN | SYSTOLIC BLOOD PRESSURE: 182 MMHG | BODY MASS INDEX: 41.02 KG/M2 | DIASTOLIC BLOOD PRESSURE: 100 MMHG | OXYGEN SATURATION: 96 % | RESPIRATION RATE: 16 BRPM | TEMPERATURE: 98 F

## 2018-09-18 DIAGNOSIS — B18.2 CHRONIC HEPATITIS C WITHOUT HEPATIC COMA: Chronic | ICD-10-CM

## 2018-09-18 DIAGNOSIS — G89.4 CHRONIC PAIN DISORDER: Chronic | ICD-10-CM

## 2018-09-18 DIAGNOSIS — I10 ESSENTIAL HYPERTENSION: Primary | Chronic | ICD-10-CM

## 2018-09-18 DIAGNOSIS — I11.9 HYPERTENSIVE LEFT VENTRICULAR HYPERTROPHY, WITHOUT HEART FAILURE: ICD-10-CM

## 2018-09-18 DIAGNOSIS — S31.109D: ICD-10-CM

## 2018-09-18 DIAGNOSIS — F33.1 MAJOR DEPRESSIVE DISORDER, RECURRENT EPISODE, MODERATE: Chronic | ICD-10-CM

## 2018-09-18 DIAGNOSIS — L02.214 ABSCESS OF GROIN, RIGHT: ICD-10-CM

## 2018-09-18 PROBLEM — S31.109A OPEN WOUND OF GROIN WITHOUT COMPLICATION: Status: RESOLVED | Noted: 2018-08-31 | Resolved: 2018-09-18

## 2018-09-18 PROCEDURE — 99214 OFFICE O/P EST MOD 30 MIN: CPT | Mod: S$PBB,,, | Performed by: FAMILY MEDICINE

## 2018-09-18 PROCEDURE — 99999 PR PBB SHADOW E&M-EST. PATIENT-LVL IV: CPT | Mod: PBBFAC,,, | Performed by: FAMILY MEDICINE

## 2018-09-18 PROCEDURE — 99214 OFFICE O/P EST MOD 30 MIN: CPT | Mod: PBBFAC,PN | Performed by: FAMILY MEDICINE

## 2018-09-18 RX ORDER — GABAPENTIN 600 MG/1
TABLET ORAL
Qty: 180 TABLET | Refills: 0 | Status: SHIPPED | OUTPATIENT
Start: 2018-09-18 | End: 2018-10-29 | Stop reason: SDUPTHER

## 2018-09-18 RX ORDER — VALSARTAN AND HYDROCHLOROTHIAZIDE 160; 12.5 MG/1; MG/1
1 TABLET, FILM COATED ORAL DAILY
Qty: 30 TABLET | Refills: 0 | Status: SHIPPED | OUTPATIENT
Start: 2018-09-18 | End: 2018-10-29 | Stop reason: SDUPTHER

## 2018-09-18 NOTE — ASSESSMENT & PLAN NOTE
- worsening symptoms 2/2 poor control of pain  - no SI/SA  - supportive care and resources low for pt  - good family support and encouraged pt to notify me with any concerns  - discussed considering switching Effexor to Cymbalta

## 2018-09-18 NOTE — PROGRESS NOTES
FAMILY MEDICINE    Patient Active Problem List   Diagnosis    Essential hypertension    Morbid obesity with BMI of 40.0-44.9, adult    Major depressive disorder, recurrent episode, moderate    Hypothyroidism due to acquired atrophy of thyroid    Chronic hepatitis C without hepatic coma    Gastroesophageal reflux disease with esophagitis    Opioid dependence    Chronic pain disorder    Schafer's esophagus    Family history of colon cancer    Hypertensive left ventricular hypertrophy, without heart failure       CC:   Chief Complaint   Patient presents with    Medication Refill       SUBJECTIVE:  Danika Voss   is a 52 y.o. female  - with chronic pain, HTN, LVH (2016 Echo) and hypothyroidism here for follow-up of her blood pressure from her last visit here 8/31/18 for a right groin abscess. She was unable to get an appt with her PCP Dr. Marge Davila. Has not been monitoring BP but reports complaint with medication.  - Increased stressors 2/2 her Pain Management physician no longer accepts her insurance and she has run out of her Gabapentin. Tearful with pain diffuse and poorly controlled. Has appt with [facility] in 1 month. Pain 7/10 her low back, knees, hips, shoulder, upper back and legs that is the same has her chronic pain but has had to decrease her Gabapentin from 6 times a day to 3-4. No weakness. No bowel or bladder incontinence.   - Depression 2/2 worsening of pain and reports that has been stable on Effexor for about 2-3 years taking 150 mg daily. Reports that worsening seems more situational but looking forward to the birth of her grandchild next week. No thoughts of hurting herself or SI. Good support from family since her  passed away.   - Right groin lesion completely healed and no issues.         ROS: Review of Systems   Constitutional: Negative for activity change, appetite change, fatigue and unexpected weight change.   Eyes: Negative for photophobia and visual  disturbance.   Respiratory: Negative for cough and shortness of breath.    Cardiovascular: Negative for chest pain, palpitations and leg swelling.   Gastrointestinal: Negative for abdominal pain, constipation, diarrhea, nausea and vomiting.   Endocrine: Negative for cold intolerance, heat intolerance, polydipsia, polyphagia and polyuria.   Genitourinary: Negative for difficulty urinating, frequency and urgency.   Musculoskeletal: Positive for arthralgias and myalgias.   Skin: Negative for rash and wound (right groin abscess resolved).   Neurological: Negative for dizziness, tremors, syncope, weakness, light-headedness and headaches.   Psychiatric/Behavioral: Positive for dysphoric mood and sleep disturbance. Negative for confusion, decreased concentration and suicidal ideas. The patient is nervous/anxious.        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    MRSA, Peptostreptococcus    NSAID induced gastritis 9/8/2015    Obesity     Parkinson disease 11/17/2015       Past Surgical History:   Procedure Laterality Date    ABCESS DRAINAGE Left 8/6/2015    left knee washout    CARDIAC SURGERY  4/7/2010    artery on wrong side, performed in [place]    CHOLECYSTECTOMY  3/2010    COLONOSCOPY N/A 7/27/2018    Procedure: COLONOSCOPY;  Surgeon: Teresa Sena MD;  Location: [facility];  Service: Endoscopy;  Laterality: N/A;    COLONOSCOPY N/A 7/27/2018    [facility]    ESOPHAGOGASTRODUODENOSCOPY N/A 7/17/2018    Procedure: ESOPHAGOGASTRODUODENOSCOPY (EGD);  Surgeon: Teresa Sena MD;  Location: [facility];  Service: Endoscopy;  Laterality: N/A;    ESOPHAGOGASTRODUODENOSCOPY (EGD) N/A 7/17/2018    Performed by Teresa DUQUE  MD Jaida at [facility]    HYSTERECTOMY      No cervix    INCISION AND DRAINAGE LEG Left 2/2/2016    [facility]ed [facility] Jr[facility]    INCISION AND DRAINAGE-KNEE Left 8/6/2015    Per[facility] by [facility] at [facility] OR    PERIPHERALLY INSERTED CENTRAL CATHETER INSERTION Right 8/6/2015    PLACEMENT-WOUND VAC Left 2/2/2016    Performed by [facility]ed [facility] Jr[facility] at [facility]    REVISION-ARTHROPLASTY-KNEE-TOTAL Left 7/21/2015    Performed by Brittany Walsh MD at [facility]    shoulder cyst removal Left 2014    [facility] at [facility]    TONSILLECTOMY      TOTAL KNEE ARTHROPLASTY Bilateral right 6/2014, left 9/30/2014    TOTAL KNEE ARTHROPLASTY Left 7/21/2015    revision       ALLERGIES:   Review of patient's allergies indicates:   Allergen Reactions    Fentanyl Other (See Comments)    Lisinopril Other (See Comments)     cough    Nsaids (non-steroidal anti-inflammatory drug) Other (See Comments)     gastritis    Toradol [ketorolac] Nausea And Vomiting and Rash       MEDS:   Current Outpatient Medications:     albuterol 90 mcg/actuation inhaler, Inhale 2 puffs into the lungs every 6 (six) hours as needed for Wheezing. Rescue, Disp: 18 g, Rfl: 1    diltiazem (CARDIZEM CD) 240 MG 24 hr capsule, Take 1 capsule (240 mg total) by mouth once daily., Disp: 30 capsule, Rfl: 11    flecainide (TAMBOCOR) 50 MG Tab, Take 100 mg by mouth 2 (two) times daily., Disp: , Rfl:     gabapentin (NEURONTIN) 600 MG tablet, TAKE 1 TABLET BY MOUTH 6 TIMES A DAY, Disp: 180 tablet, Rfl: 0    levothyroxine (SYNTHROID) 100 MCG tablet, Take 1 tablet by mouth once daily., Disp: 30 tablet, Rfl: 2    mirtazapine (REMERON) 30 MG tablet, Take 1 tablet(s) every day by oral route., Disp: , Rfl: 0    oxyCODONE-acetaminophen (PERCOCET) 5-325 mg per tablet, TAKE 1 TABLET BY MOUTH THREE TIMES A DAY FOR 30 DAYS, Disp: , Rfl: 0    venlafaxine (EFFEXOR-XR) 150 MG Cp24, Take 1 capsule (150  "mg total) by mouth once daily., Disp: 10 capsule, Rfl: 0    valsartan-hydrochlorothiazide (DIOVAN-HCT) 160-12.5 mg per tablet, Take 1 tablet by mouth once daily., Disp: 30 tablet, Rfl: 0    OBJECTIVE:   Vitals:    09/18/18 0841   BP: (!) 182/100   BP Location: Left arm   Patient Position: Sitting   BP Method: Large (Manual)   Pulse: 92   Resp: 16   Temp: 97.9 °F (36.6 °C)   TempSrc: Oral   SpO2: 96%   Weight: (!) 148 kg (326 lb 4.8 oz)   Height: 5' 10.5" (1.791 m)     Body mass index is 46.16 kg/m².    Physical Exam   Constitutional: No distress.   Neck: Neck supple. No thyromegaly present.   Cardiovascular: Normal rate, regular rhythm, normal heart sounds and intact distal pulses.   No murmur heard.  Pulmonary/Chest: Effort normal and breath sounds normal.   Abdominal: Soft. Bowel sounds are normal.   Musculoskeletal: She exhibits no edema.   Neurological: She is alert. She exhibits normal muscle tone.   Skin: Skin is warm.   Psychiatric: She expresses no suicidal ideation. She expresses no suicidal plans.   Mood "I feel stupid running out of my medications," sad  Affect Tearful         ASSESSMENT:  Problem List Items Addressed This Visit     Essential hypertension - Primary (Chronic)    Current Assessment & Plan     - poorly controlled on Dilitazem  mg daily  - add Valsartan/HCTZ 160/12.5 mg daily  - rec BP <130/80         Relevant Medications    valsartan-hydrochlorothiazide (DIOVAN-HCT) 160-12.5 mg per tablet    Major depressive disorder, recurrent episode, moderate (Chronic)    Current Assessment & Plan     - worsening symptoms 2/2 poor control of pain  - no SI/SA  - supportive care and resources low for pt  - good family support and encouraged pt to notify me with any concerns  - discussed considering switching Effexor to Cymbalta         Chronic hepatitis C without hepatic coma (Chronic)    Overview     - 7/2018 GI eval: Type 3 with minimal fibrosis but +active inflammation          Current Assessment " & Plan     - had appt with Hepatology 7/30/18         Chronic pain disorder (Chronic)    Current Assessment & Plan     - followed by Dr. [facility] but no longer to follow 2/2 insurance changes  - had appt pending with [facility]  - gordo to refill Gabapentin         Relevant Medications    gabapentin (NEURONTIN) 600 MG tablet    RESOLVED: Abscess of groin, right    RESOLVED: Open wound of groin without complication    Hypertensive left ventricular hypertrophy, without heart failure    Current Assessment & Plan     - reviewed prior Echo               PLAN:   Orders Placed This Encounter    valsartan-hydrochlorothiazide (DIOVAN-HCT) 160-12.5 mg per tablet    gabapentin (NEURONTIN) 600 MG tablet       Follow-up with 2 weeks    Dr. Rosario Lewis D.O.   Family Medicine

## 2018-09-18 NOTE — ASSESSMENT & PLAN NOTE
- followed by Dr. [facility] but no longer to follow 2/2 insurance changes  - had appt pending with [facility]  - okay to refill Gabapentin

## 2018-09-18 NOTE — ASSESSMENT & PLAN NOTE
- poorly controlled on Dilitazem  mg daily  - add Valsartan/HCTZ 160/12.5 mg daily  - rec BP <130/80

## 2018-10-02 ENCOUNTER — OFFICE VISIT (OUTPATIENT)
Dept: FAMILY MEDICINE | Facility: CLINIC | Age: 53
End: 2018-10-02
Payer: MEDICAID

## 2018-10-02 VITALS
BODY MASS INDEX: 41.02 KG/M2 | SYSTOLIC BLOOD PRESSURE: 148 MMHG | OXYGEN SATURATION: 98 % | DIASTOLIC BLOOD PRESSURE: 90 MMHG | HEIGHT: 71 IN | HEART RATE: 88 BPM | WEIGHT: 293 LBS | RESPIRATION RATE: 16 BRPM | TEMPERATURE: 98 F

## 2018-10-02 DIAGNOSIS — G47.09 OTHER INSOMNIA: ICD-10-CM

## 2018-10-02 DIAGNOSIS — G89.4 CHRONIC PAIN DISORDER: Chronic | ICD-10-CM

## 2018-10-02 DIAGNOSIS — F33.1 MAJOR DEPRESSIVE DISORDER, RECURRENT EPISODE, MODERATE: Chronic | ICD-10-CM

## 2018-10-02 DIAGNOSIS — I10 ESSENTIAL HYPERTENSION: Primary | Chronic | ICD-10-CM

## 2018-10-02 PROCEDURE — 90686 IIV4 VACC NO PRSV 0.5 ML IM: CPT | Mod: PBBFAC,PN

## 2018-10-02 PROCEDURE — 99999 PR PBB SHADOW E&M-EST. PATIENT-LVL IV: CPT | Mod: PBBFAC,,, | Performed by: FAMILY MEDICINE

## 2018-10-02 PROCEDURE — 99214 OFFICE O/P EST MOD 30 MIN: CPT | Mod: PBBFAC,PN,25 | Performed by: FAMILY MEDICINE

## 2018-10-02 PROCEDURE — 99214 OFFICE O/P EST MOD 30 MIN: CPT | Mod: S$PBB,,, | Performed by: FAMILY MEDICINE

## 2018-10-02 RX ORDER — TRAZODONE HYDROCHLORIDE 50 MG/1
50 TABLET ORAL NIGHTLY PRN
Qty: 30 TABLET | Refills: 3 | Status: SHIPPED | OUTPATIENT
Start: 2018-10-02 | End: 2018-11-19

## 2018-10-02 RX ORDER — DULOXETIN HYDROCHLORIDE 30 MG/1
CAPSULE, DELAYED RELEASE ORAL
Qty: 46 CAPSULE | Refills: 0 | Status: SHIPPED | OUTPATIENT
Start: 2018-10-02 | End: 2018-10-29

## 2018-10-02 RX ORDER — CARVEDILOL 3.12 MG/1
3.12 TABLET ORAL 2 TIMES DAILY
Qty: 60 TABLET | Refills: 0 | Status: SHIPPED | OUTPATIENT
Start: 2018-10-02 | End: 2018-10-29 | Stop reason: SDUPTHER

## 2018-10-02 RX ORDER — VENLAFAXINE HYDROCHLORIDE 37.5 MG/1
CAPSULE, EXTENDED RELEASE ORAL
Qty: 42 CAPSULE | Refills: 0 | Status: SHIPPED | OUTPATIENT
Start: 2018-10-02 | End: 2018-10-29

## 2018-10-02 NOTE — ASSESSMENT & PLAN NOTE
- previously Remeron used for sleep without h/o severe mood disorder  - stop Remeron  - trial Trazodone for sleep  - counseling on sleep hygiene
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
## 2018-10-02 NOTE — PATIENT INSTRUCTIONS
Week 1-2: Decrease Effexor to 75 mg daily by taking two 37.5 mg pills daily AND start Cymbalta 30 mg daily for 1 week  Week 3-4: Decrease Effexor to 37.5 mg daily AND continue Cymbalta 60 mg daily  Week 5: STOP Effexor and continue Cymbalta to 60 mg daily    1. Trazadone for sleep  - stop Mirtazapine  2. Start Carvedilol 3.125 mg twice a day for blood pressure

## 2018-10-15 ENCOUNTER — OFFICE VISIT (OUTPATIENT)
Dept: FAMILY MEDICINE | Facility: CLINIC | Age: 53
End: 2018-10-15
Payer: MEDICAID

## 2018-10-15 VITALS
TEMPERATURE: 98 F | DIASTOLIC BLOOD PRESSURE: 80 MMHG | BODY MASS INDEX: 41.02 KG/M2 | OXYGEN SATURATION: 98 % | SYSTOLIC BLOOD PRESSURE: 142 MMHG | WEIGHT: 293 LBS | HEIGHT: 71 IN | HEART RATE: 95 BPM

## 2018-10-15 DIAGNOSIS — R00.2 PALPITATIONS: ICD-10-CM

## 2018-10-15 DIAGNOSIS — L97.829 VENOUS STASIS ULCER OF OTHER PART OF LEFT LOWER LEG WITH VARICOSE VEINS, UNSPECIFIED ULCER STAGE: ICD-10-CM

## 2018-10-15 DIAGNOSIS — E03.4 HYPOTHYROIDISM DUE TO ACQUIRED ATROPHY OF THYROID: Chronic | ICD-10-CM

## 2018-10-15 DIAGNOSIS — I11.9 HYPERTENSIVE LEFT VENTRICULAR HYPERTROPHY, WITHOUT HEART FAILURE: ICD-10-CM

## 2018-10-15 DIAGNOSIS — I83.028 VENOUS STASIS ULCER OF OTHER PART OF LEFT LOWER LEG WITH VARICOSE VEINS, UNSPECIFIED ULCER STAGE: ICD-10-CM

## 2018-10-15 DIAGNOSIS — I10 ESSENTIAL HYPERTENSION: Chronic | ICD-10-CM

## 2018-10-15 DIAGNOSIS — L30.9 DERMATITIS: Primary | ICD-10-CM

## 2018-10-15 PROBLEM — I83.009 VENOUS STASIS ULCER WITH VARICOSE VEINS: Status: ACTIVE | Noted: 2018-10-15

## 2018-10-15 PROBLEM — L97.909 VENOUS STASIS ULCER WITH VARICOSE VEINS: Status: ACTIVE | Noted: 2018-10-15

## 2018-10-15 PROCEDURE — 99214 OFFICE O/P EST MOD 30 MIN: CPT | Mod: S$PBB,,, | Performed by: FAMILY MEDICINE

## 2018-10-15 PROCEDURE — 99999 PR PBB SHADOW E&M-EST. PATIENT-LVL V: CPT | Mod: PBBFAC,,, | Performed by: FAMILY MEDICINE

## 2018-10-15 PROCEDURE — 99215 OFFICE O/P EST HI 40 MIN: CPT | Mod: PBBFAC,PN | Performed by: FAMILY MEDICINE

## 2018-10-15 RX ORDER — OXYCODONE HYDROCHLORIDE 10 MG/1
10 TABLET ORAL 3 TIMES DAILY PRN
Refills: 0 | COMMUNITY
Start: 2018-10-04 | End: 2018-10-29

## 2018-10-15 RX ORDER — BETAMETHASONE VALERATE 1.2 MG/G
CREAM TOPICAL 2 TIMES DAILY
Qty: 45 G | Refills: 0 | Status: SHIPPED | OUTPATIENT
Start: 2018-10-15 | End: 2019-03-07 | Stop reason: SDUPTHER

## 2018-10-15 NOTE — ASSESSMENT & PLAN NOTE
- pt possibly had h/o SVT  - on diltiazem and HR RRR this visit  - check TSH and CBC  - refer to Cardiology

## 2018-10-15 NOTE — PROGRESS NOTES
"FAMILY MEDICINE    Patient Active Problem List   Diagnosis    Essential hypertension    Morbid obesity with BMI of 40.0-44.9, adult    Major depressive disorder, recurrent episode, moderate    Hypothyroidism due to acquired atrophy of thyroid    Chronic hepatitis C without hepatic coma    Gastroesophageal reflux disease with esophagitis    Opioid dependence    Chronic pain disorder    Schafer's esophagus    Family history of colon cancer    Hypertensive left ventricular hypertrophy, without heart failure    Other insomnia    Venous stasis ulcer with varicose veins    Dermatitis    Palpitations       CC:   Chief Complaint   Patient presents with    Edema    leg infected       SUBJECTIVE:  Danika Voss   is a 53 y.o. female  - with HTN, depression, chronic pain and hypothyroidism presents with concerns for recurrent wound left leg with +drainage, hand rash and reports that she would like to see a Cardiologist because she has had a h/o severe tachycardia.   1. Left anterior leg wound: reports that wound opened up again about 2 weeks ago and last week started oozing and she became very concerned. She has been suffering from this wound on and off for about 4 years. During her last visits, it had healed after she was instructed on skin care of the area and she was compliant with it. However she has become more busy caring for her grandchild and with chronic pain has not be able to care for her legs or elevate them and the wound reopened. She reports that initially it was red and she started putting "an antibiotic ointment on it" (she not sure of the name) and redness improved. Weeping clear discharge. No swelling.   2. Reports that she has a h/o a severe "fast heart rate that even Adenosine did not work" [place]. She reports that she feels like her heart rate is going up again and is concerned. +palpitations without SOB or chest pain that last several minutes. Worsening her " anxiety. No syncope  3. Bilateral hand rash that started about 2 weeks ago. Denies h/o similar rash. No pain. +itching. No h/o psoriasis. No contact with new chemical, detergent or fabris        ROS: Review of Systems   Constitutional: Positive for unexpected weight change (+10 lbs weight gain since last visit). Negative for activity change, appetite change, chills and fever.   HENT: Negative for mouth sores.    Eyes: Negative for photophobia and visual disturbance.   Respiratory: Negative for chest tightness, shortness of breath and wheezing.    Cardiovascular: Positive for leg swelling. Negative for chest pain and palpitations.   Gastrointestinal: Negative for abdominal distention, abdominal pain, constipation, diarrhea, nausea and vomiting.   Endocrine: Negative for cold intolerance, heat intolerance, polydipsia, polyphagia and polyuria.   Genitourinary: Negative for difficulty urinating.   Musculoskeletal: Positive for arthralgias and myalgias.   Skin: Positive for wound.   Neurological: Negative for dizziness, syncope, speech difficulty, weakness, light-headedness, numbness and headaches.   Psychiatric/Behavioral: The patient is nervous/anxious.        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    MRSA, Peptostreptococcus    NSAID induced gastritis 9/8/2015    Obesity     Parkinson disease 11/17/2015       Past Surgical History:   Procedure Laterality Date    ABCESS DRAINAGE Left 8/6/2015    left knee washout    CARDIAC SURGERY  4/7/2010    artery on wrong side, performed in [place]    CHOLECYSTECTOMY  3/2010    COLONOSCOPY N/A 7/27/2018    Procedure: COLONOSCOPY;  Surgeon: Teresa Sena MD;  Location[facility];  Service: Endoscopy;  Laterality:  N/A;    COLONOSCOPY N/A 7/27/2018    Per[facility]    ESOPHAGOGASTRODUODENOSCOPY N/A 7/17/2018    Procedure: ESOPHAGOGASTRODUODENOSCOPY (EGD);  Surgeon: Teresa Sena MD;  Location: [facility];  Service: Endoscopy;  Laterality: N/A;    ESOPHAGOGASTRODUODENOSCOPY (EGD) N/A 7/17/2018    Performed by [facility] MD at [facility]    HYSTERECTOMY      No cervix    INCISION AND DRAINAGE LEG Left 2/2/2016    Per[facility] by [facility]ed [facility]uchs Jr[facility] at [facility] OR    INCISION AND DRAINAGE-KNEE Left 8/6/2015    [facility] at [facility] OR    PERIPHERALLY INSERTED CENTRAL CATHETER INSERTION Right 8/6/2015    PLACEMENT-WOUND VAC Left 2/2/2016    Performed by [facility] Jr[facility] at [facility] OR    REVISION-ARTHROPLASTY-KNEE-TOTAL Left 7/21/2015    Performed by Brittany Walsh MD at [facility] OR    shoulder cyst removal Left 2014    [facility]    TONSILLECTOMY      TOTAL KNEE ARTHROPLASTY Bilateral right 6/2014, left 9/30/2014    TOTAL KNEE ARTHROPLASTY Left 7/21/2015    revision       ALLERGIES:   Review of patient's allergies indicates:   Allergen Reactions    Fentanyl Other (See Comments)    Lisinopril Other (See Comments)     cough    Nsaids (non-steroidal anti-inflammatory drug) Other (See Comments)     gastritis    Toradol [ketorolac] Nausea And Vomiting and Rash       MEDS:   Current Outpatient Medications:     albuterol 90 mcg/actuation inhaler, Inhale 2 puffs into the lungs every 6 (six) hours as needed for Wheezing. Rescue, Disp: 18 g, Rfl: 1    carvedilol (COREG) 3.125 MG tablet, Take 1 tablet (3.125 mg total) by mouth 2 (two) times daily., Disp: 60 tablet, Rfl: 0    diltiazem (CARDIZEM CD) 240 MG 24 hr capsule, Take 1 capsule (240 mg total) by mouth once daily., Disp: 30 capsule, Rfl: 11    DULoxetine (CYMBALTA) 30 MG capsule, Take 1 capsule (30 mg total) by mouth once daily for 14 days, THEN 2  "capsules (60 mg total) once daily for 16 days., Disp: 46 capsule, Rfl: 0    flecainide (TAMBOCOR) 50 MG Tab, Take 100 mg by mouth 2 (two) times daily., Disp: , Rfl:     gabapentin (NEURONTIN) 600 MG tablet, TAKE 1 TABLET BY MOUTH 6 TIMES A DAY, Disp: 180 tablet, Rfl: 0    levothyroxine (SYNTHROID) 100 MCG tablet, Take 1 tablet by mouth once daily., Disp: 30 tablet, Rfl: 2    oxyCODONE (ROXICODONE) 10 mg Tab immediate release tablet, Take 10 mg by mouth 3 (three) times daily as needed., Disp: , Rfl: 0    traZODone (DESYREL) 50 MG tablet, Take 1 tablet (50 mg total) by mouth nightly as needed for Insomnia., Disp: 30 tablet, Rfl: 3    valsartan-hydrochlorothiazide (DIOVAN-HCT) 160-12.5 mg per tablet, Take 1 tablet by mouth once daily., Disp: 30 tablet, Rfl: 0    betamethasone valerate 0.1% (VALISONE) 0.1 % Crea, Apply topically 2 (two) times daily. for 10 days, Disp: 45 g, Rfl: 0    venlafaxine (EFFEXOR-XR) 37.5 MG 24 hr capsule, Take 2 capsules (75 mg total) by mouth once daily for 14 days, THEN 1 capsule (37.5 mg total) once daily for 14 days., Disp: 42 capsule, Rfl: 0    OBJECTIVE:   Vitals:    10/15/18 1104   BP: (!) 142/80   BP Location: Left arm   Patient Position: Sitting   BP Method: Large (Manual)   Pulse: 95   Temp: 98 °F (36.7 °C)   TempSrc: Oral   SpO2: 98%   Weight: (!) 152.9 kg (337 lb)   Height: 5' 10.5" (1.791 m)     Body mass index is 47.67 kg/m².    Physical Exam   Constitutional: No distress.   Neck: Neck supple.   Cardiovascular: Normal rate, regular rhythm, normal heart sounds and intact distal pulses. Exam reveals no gallop and no friction rub.   No murmur heard.  Pulmonary/Chest: Effort normal and breath sounds normal.   Musculoskeletal: She exhibits edema (trace bilateral LE).   Skin: Capillary refill takes less than 2 seconds.        +varicose vein and varicose changes of her feet                             ASSESSMENT:  Problem List Items Addressed This Visit     Essential hypertension " (Chronic)    Current Assessment & Plan     - improved with addition of medications but still above goa;  - refer to Cardiology for assistance         Relevant Orders    Comprehensive metabolic panel    CBC auto differential (Completed)    Hypothyroidism due to acquired atrophy of thyroid (Chronic)    Current Assessment & Plan     - check TSH         Relevant Orders    TSH    T4, free    Hypertensive left ventricular hypertrophy, without heart failure    Current Assessment & Plan     - BP control  - refer to Cardiology for assistance         Relevant Orders    Ambulatory Referral to Cardiology    Venous stasis ulcer with varicose veins    Current Assessment & Plan     - recurrent  - no signs of infection  - wrapped with petroleum guaze, Kerlix and stockinette  - rec elevate feet  - refer to Wound Care         Relevant Orders    Ambulatory Referral to Wound Clinic    Dermatitis - Primary    Current Assessment & Plan     - possible psorasis  - topical betamethasone and if does not resolve will refer to Dermatology         Relevant Medications    betamethasone valerate 0.1% (VALISONE) 0.1 % Crea    Palpitations    Current Assessment & Plan     - pt possibly had h/o SVT  - on diltiazem and HR RRR this visit  - check TSH and CBC  - refer to Cardiology               PLAN:   Orders Placed This Encounter    Comprehensive metabolic panel    CBC auto differential    TSH    T4, free    Ambulatory Referral to Cardiology    Ambulatory Referral to Wound Clinic    betamethasone valerate 0.1% (VALISONE) 0.1 % Crea     Follow-up in 1 month.     Dr. Rosario Lewis D.O.   Family Medicine

## 2018-10-15 NOTE — ASSESSMENT & PLAN NOTE
- improved with addition of medications but still above goa;  - refer to Cardiology for assistance

## 2018-10-15 NOTE — ASSESSMENT & PLAN NOTE
- recurrent  - no signs of infection  - wrapped with petroleum guaze, Kerlix and stockinette  - rec elevate feet  - refer to Wound Care

## 2018-10-16 ENCOUNTER — OFFICE VISIT (OUTPATIENT)
Dept: CARDIOLOGY | Facility: CLINIC | Age: 53
End: 2018-10-16
Payer: MEDICAID

## 2018-10-16 VITALS
OXYGEN SATURATION: 98 % | HEIGHT: 70 IN | SYSTOLIC BLOOD PRESSURE: 166 MMHG | DIASTOLIC BLOOD PRESSURE: 102 MMHG | HEART RATE: 76 BPM | BODY MASS INDEX: 41.95 KG/M2 | WEIGHT: 293 LBS

## 2018-10-16 DIAGNOSIS — L97.101 VENOUS STASIS ULCER OF THIGH LIMITED TO BREAKDOWN OF SKIN WITH VARICOSE VEINS, UNSPECIFIED LATERALITY: ICD-10-CM

## 2018-10-16 DIAGNOSIS — I10 ESSENTIAL HYPERTENSION: Chronic | ICD-10-CM

## 2018-10-16 DIAGNOSIS — F11.20 UNCOMPLICATED OPIOID DEPENDENCE: ICD-10-CM

## 2018-10-16 DIAGNOSIS — E66.01 MORBID OBESITY WITH BMI OF 40.0-44.9, ADULT: Chronic | ICD-10-CM

## 2018-10-16 DIAGNOSIS — I11.9 HYPERTENSIVE LEFT VENTRICULAR HYPERTROPHY, WITHOUT HEART FAILURE: ICD-10-CM

## 2018-10-16 DIAGNOSIS — R00.2 PALPITATIONS: Primary | ICD-10-CM

## 2018-10-16 DIAGNOSIS — G89.4 CHRONIC PAIN DISORDER: Chronic | ICD-10-CM

## 2018-10-16 DIAGNOSIS — F33.1 MAJOR DEPRESSIVE DISORDER, RECURRENT EPISODE, MODERATE: Chronic | ICD-10-CM

## 2018-10-16 DIAGNOSIS — I83.001 VENOUS STASIS ULCER OF THIGH LIMITED TO BREAKDOWN OF SKIN WITH VARICOSE VEINS, UNSPECIFIED LATERALITY: ICD-10-CM

## 2018-10-16 PROCEDURE — 99999 PR PBB SHADOW E&M-EST. PATIENT-LVL III: CPT | Mod: PBBFAC,,, | Performed by: INTERNAL MEDICINE

## 2018-10-16 PROCEDURE — 99213 OFFICE O/P EST LOW 20 MIN: CPT | Mod: PBBFAC,PN | Performed by: INTERNAL MEDICINE

## 2018-10-16 PROCEDURE — 99205 OFFICE O/P NEW HI 60 MIN: CPT | Mod: S$PBB,,, | Performed by: INTERNAL MEDICINE
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
## 2018-10-16 NOTE — LETTER
October 16, 2018      Rosario Lewis, DO  [address]  [address]   [facility] [address]           [facility]  [address] [place]   [facility] [address]  Phone: [phone]  Fax: [phone]          Patient: Danika Voss   MR Number: [id number]   YOB: 1965   Date of Visit: 10/16/2018       Dear Dr. Rosario Lewis:    Thank you for referring Danika Voss to me for evaluation. Attached you will find relevant portions of my assessment and plan of care.    If you have questions, please do not hesitate to call me. I look forward to following Danika Voss along with you.    Sincerely,    Glenroy Montague MD PhD    Enclosure  CC:  No Recipients    If you would like to receive this communication electronically, please contact [email or web address] or [phone] to request more information on [employer] access.    For providers and/or their staff who would like to refer a patient to [facility], please contact us through our one-stop-shop provider referral line, [facility], at [phone].    If you feel you have received this communication in error or would no longer like to receive these types of communications, please e-mail [email or web address]

## 2018-10-16 NOTE — PATIENT INSTRUCTIONS
Assessment/Plan:  Danika Voss is a 53 y.o. female with a past medical history of HTN, cardiac surgery (artery on wrong side per pt), morbid obesity, who presents for an initial appointment.     1. Palpitations- Pt with symptomatic palpitations as described.  Check echo and 30 day event monitor to evaluate further.      2. HTN- Continue current regimen.  Pt following up with Dr. Lewis in 2 weeks.  Continue to monitor.     3. Morbid Obesity- Pt extensively counseled on the importance of diet, exercise and weight loss.  Continue to monitor BMI.     Obtain outside records  Follow up in 8 weeks

## 2018-10-17 ENCOUNTER — CLINICAL SUPPORT (OUTPATIENT)
Dept: ELECTROPHYSIOLOGY | Facility: CLINIC | Age: 53
End: 2018-10-17
Attending: INTERNAL MEDICINE
Payer: MEDICAID

## 2018-10-17 DIAGNOSIS — R00.2 PALPITATIONS: ICD-10-CM

## 2018-10-17 DIAGNOSIS — F11.20 UNCOMPLICATED OPIOID DEPENDENCE: ICD-10-CM

## 2018-10-17 DIAGNOSIS — F33.1 MAJOR DEPRESSIVE DISORDER, RECURRENT EPISODE, MODERATE: Chronic | ICD-10-CM

## 2018-10-17 DIAGNOSIS — G89.4 CHRONIC PAIN DISORDER: Chronic | ICD-10-CM

## 2018-10-17 DIAGNOSIS — I11.9 HYPERTENSIVE LEFT VENTRICULAR HYPERTROPHY, WITHOUT HEART FAILURE: ICD-10-CM

## 2018-10-17 DIAGNOSIS — I83.001 VENOUS STASIS ULCER OF THIGH LIMITED TO BREAKDOWN OF SKIN WITH VARICOSE VEINS, UNSPECIFIED LATERALITY: ICD-10-CM

## 2018-10-17 DIAGNOSIS — I10 ESSENTIAL HYPERTENSION: Chronic | ICD-10-CM

## 2018-10-17 DIAGNOSIS — E66.01 MORBID OBESITY WITH BMI OF 40.0-44.9, ADULT: Chronic | ICD-10-CM

## 2018-10-17 DIAGNOSIS — L97.101 VENOUS STASIS ULCER OF THIGH LIMITED TO BREAKDOWN OF SKIN WITH VARICOSE VEINS, UNSPECIFIED LATERALITY: ICD-10-CM

## 2018-10-17 PROCEDURE — 93270 REMOTE 30 DAY ECG REV/REPORT: CPT | Mod: PBBFAC | Performed by: INTERNAL MEDICINE

## 2018-10-22 ENCOUNTER — HOSPITAL ENCOUNTER (OUTPATIENT)
Dept: RADIOLOGY | Facility: HOSPITAL | Age: 53
Discharge: HOME | End: 2018-10-22
Attending: EMERGENCY MEDICINE
Payer: MEDICAID

## 2018-10-22 ENCOUNTER — HOSPITAL ENCOUNTER (OUTPATIENT)
Dept: WOUND CARE | Facility: HOSPITAL | Age: 53
Discharge: HOME | End: 2018-10-22
Attending: EMERGENCY MEDICINE
Payer: MEDICAID

## 2018-10-22 VITALS
BODY MASS INDEX: 41.95 KG/M2 | HEART RATE: 91 BPM | SYSTOLIC BLOOD PRESSURE: 175 MMHG | DIASTOLIC BLOOD PRESSURE: 110 MMHG | TEMPERATURE: 99 F | HEIGHT: 70 IN | WEIGHT: 293 LBS

## 2018-10-22 DIAGNOSIS — I83.028 VENOUS STASIS ULCER OF OTHER PART OF LEFT LOWER LEG LIMITED TO BREAKDOWN OF SKIN WITH VARICOSE VEINS: ICD-10-CM

## 2018-10-22 DIAGNOSIS — L97.821 VENOUS STASIS ULCER OF OTHER PART OF LEFT LOWER LEG LIMITED TO BREAKDOWN OF SKIN WITH VARICOSE VEINS: ICD-10-CM

## 2018-10-22 DIAGNOSIS — I83.028 VENOUS STASIS ULCER OF OTHER PART OF LEFT LOWER LEG LIMITED TO BREAKDOWN OF SKIN WITH VARICOSE VEINS: Primary | ICD-10-CM

## 2018-10-22 DIAGNOSIS — L97.821 VENOUS STASIS ULCER OF OTHER PART OF LEFT LOWER LEG LIMITED TO BREAKDOWN OF SKIN WITH VARICOSE VEINS: Primary | ICD-10-CM

## 2018-10-22 PROCEDURE — 93971 EXTREMITY STUDY: CPT | Mod: TC

## 2018-10-22 PROCEDURE — 99204 OFFICE O/P NEW MOD 45 MIN: CPT | Mod: 25

## 2018-10-22 PROCEDURE — 93971 EXTREMITY STUDY: CPT | Mod: 26,,, | Performed by: RADIOLOGY

## 2018-10-22 PROCEDURE — 87075 CULTR BACTERIA EXCEPT BLOOD: CPT

## 2018-10-22 PROCEDURE — 87186 SC STD MICRODIL/AGAR DIL: CPT

## 2018-10-22 PROCEDURE — 87077 CULTURE AEROBIC IDENTIFY: CPT

## 2018-10-22 PROCEDURE — 87070 CULTURE OTHR SPECIMN AEROBIC: CPT

## 2018-10-22 RX ORDER — DOXYCYCLINE HYCLATE 100 MG
100 TABLET ORAL EVERY 12 HOURS
Qty: 28 TABLET | Refills: 0 | Status: SHIPPED | OUTPATIENT
Start: 2018-10-22 | End: 2018-11-05

## 2018-10-22 RX ORDER — HYDROXYZINE HYDROCHLORIDE 50 MG/1
50 TABLET, FILM COATED ORAL 4 TIMES DAILY PRN
Qty: 30 TABLET | Refills: 1 | Status: SHIPPED | OUTPATIENT
Start: 2018-10-22 | End: 2018-11-09 | Stop reason: SDUPTHER

## 2018-10-22 RX ORDER — HYDROMORPHONE HYDROCHLORIDE 2 MG/1
2 TABLET ORAL EVERY 12 HOURS PRN
Qty: 20 TABLET | Refills: 0 | Status: SHIPPED | OUTPATIENT
Start: 2018-10-22 | End: 2018-11-09 | Stop reason: SDUPTHER

## 2018-10-22 NOTE — PROGRESS NOTES
Much of the documentation for this visit was completed in the [facility] system. Please see the attached documentation for further details about this patient's care.     Loretta Rodriguez RN

## 2018-10-24 LAB — BACTERIA SPEC AEROBE CULT: NORMAL

## 2018-10-26 PROBLEM — R79.89 ELEVATED LFTS: Status: ACTIVE | Noted: 2018-10-26

## 2018-10-26 LAB — BACTERIA SPEC ANAEROBE CULT: NORMAL

## 2018-10-26 NOTE — PROGRESS NOTES
FAMILY MEDICINE    Patient Active Problem List   Diagnosis    Essential hypertension    Morbid obesity with BMI of 40.0-44.9, adult    Recurrent major depressive disorder, in partial remission    Hypothyroidism due to acquired atrophy of thyroid    Chronic hepatitis C without hepatic coma    Gastroesophageal reflux disease with esophagitis    Opioid dependence    Chronic pain disorder    Schafer's esophagus    Family history of colon cancer    Hypertensive left ventricular hypertrophy, without heart failure    Other insomnia    Venous stasis ulcer with varicose veins    Dermatitis    Elevated LFTs       CC:   Chief Complaint   Patient presents with    Foot Pain       SUBJECTIVE:  Danika Voss   is a 53 y.o. female  - with HTN, depression, hypothyroidism, h/o SVT, chronic pain and venous statis with ulcer and MRSA infection (followed wound care) presents for follow-up BP. Reports that she is seeing wound care and start Doxycycline 2/2 MRSA infection. Wound care started her on Dilaudid 2 mg BID and she stopped Oxycodone 10 mg TID prescribed by her previous pain management physician. She is feeling remarkably better with this change in medication with also noted in improvement in her BP.   She is tolerating her BP medication and HR stable on Cardizem, Coreg and flecainide without bradycardia, fatigue, SOB, LEE, headache, lightheadedness or pre-syncope/syncope.   Depression and anxiety: tolerated transition from Effexor XR to Cymbalta and has uptitrated to 60 mg daily. Denies any unwanted side effects. Depression and anxiety also improved with improved pain control.         ROS: Review of Systems   Constitutional: Positive for activity change (increased activity with improved pain control). Negative for appetite change, fatigue and unexpected weight change.   Eyes: Negative for visual disturbance.   Respiratory: Negative for chest tightness and shortness of breath.    Cardiovascular: Negative for chest  pain, palpitations and leg swelling.   Gastrointestinal: Negative for abdominal pain, constipation, diarrhea, nausea and vomiting.   Endocrine: Negative for cold intolerance, heat intolerance, polydipsia, polyphagia and polyuria.   Musculoskeletal: Positive for arthralgias (improved control of pain) and myalgias (improved control of pain).   Skin: Positive for rash and wound.   Neurological: Negative for dizziness, weakness and headaches.   Psychiatric/Behavioral: Positive for dysphoric mood (improved). The patient is nervous/anxious (improved control).        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    MRSA, Peptostreptococcus    NSAID induced gastritis 9/8/2015    Obesity     Parkinson disease 11/17/2015       Past Surgical History:   Procedure Laterality Date    ABCESS DRAINAGE Left 8/6/2015    left knee washout    CARDIAC SURGERY  4/7/2010    artery on wrong side, performed in [place]    CHOLECYSTECTOMY  3/2010    COLONOSCOPY N/A 7/27/2018    Procedure: COLONOSCOPY;  Surgeon: Teresa Sena MD;  Location: [facility];  Service: Endoscopy;  Laterality: N/A;    COLONOSCOPY N/A 7/27/2018    Performed [facility] at [facility]    ESOPHAGOGASTRODUODENOSCOPY N/A 7/17/2018    Procedure: ESOPHAGOGASTRODUODENOSCOPY (EGD);  Surgeon: Teresa Sena MD;  Location: [facility];  Service: Endoscopy;  Laterality: N/A;    ESOPHAGOGASTRODUODENOSCOPY (EGD) N/A 7/17/2018    Performed by [facility] at [facility]    HYSTERECTOMY      No cervix    INCISION AND DRAINAGE LEG Left 2/2/2016    [facility]ed [facility]uchs Jr[facility]    INCISION AND DRAINAGE-KNEE Left 8/6/2015    [facility] at [facility] OR     PERIPHERALLY INSERTED CENTRAL CATHETER INSERTION Right 8/6/2015    PLACEMENT-WOUND VAC Left 2/2/2016    [facility]    REVISION-ARTHROPLASTY-KNEE-TOTAL Left 7/21/2015    [facility] Britt[facility] Walsh [facility] at [facility]    shoulder cyst removal Left 2014    [facility] Arroyo at [facility]    TONSILLECTOMY      TOTAL KNEE ARTHROPLASTY Bilateral right 6/2014, left 9/30/2014    TOTAL KNEE ARTHROPLASTY Left 7/21/2015    revision       ALLERGIES:   Review of patient's allergies indicates:   Allergen Reactions    Fentanyl Other (See Comments)     Fentanyl patches burn skin    Lisinopril Other (See Comments)     cough    Nsaids (non-steroidal anti-inflammatory drug) Other (See Comments)     gastritis    Toradol [ketorolac] Nausea And Vomiting and Rash       MEDS:   Current Outpatient Medications:     albuterol 90 mcg/actuation inhaler, Inhale 2 puffs into the lungs every 6 (six) hours as needed for Wheezing. Rescue, Disp: 18 g, Rfl: 1    carvedilol (COREG) 3.125 MG tablet, Take 1 tablet (3.125 mg total) by mouth 2 (two) times daily., Disp: 180 tablet, Rfl: 1    diltiaZEM (CARDIZEM CD) 240 MG 24 hr capsule, Take 1 capsule (240 mg total) by mouth once daily., Disp: 90 capsule, Rfl: 1    doxycycline (VIBRA-TABS) 100 MG tablet, Take 1 tablet (100 mg total) by mouth every 12 (twelve) hours. for 14 days, Disp: 28 tablet, Rfl: 0    DULoxetine (CYMBALTA) 60 MG capsule, Take 1 capsule (60 mg total) by mouth once daily., Disp: 90 capsule, Rfl: 1    flecainide (TAMBOCOR) 50 MG Tab, Take 100 mg by mouth 2 (two) times daily., Disp: , Rfl:     gabapentin (NEURONTIN) 600 MG tablet, Take 1 tablet (600 mg total) by mouth 6 (six) times daily. TAKE 1 TABLET BY MOUTH 6 TIMES A DAY, Disp: 180 tablet, Rfl: 2    HYDROmorphone (DILAUDID) 2 MG tablet, Take 1 tablet (2 mg total) by mouth every 12 (twelve) hours as needed for Pain., Disp: 20 tablet, Rfl: 0    hydrOXYzine (ATARAX)  "50 MG tablet, Take 1 tablet (50 mg total) by mouth 4 (four) times daily as needed for Itching., Disp: 30 tablet, Rfl: 1    levothyroxine (SYNTHROID) 100 MCG tablet, Take 1 tablet (100 mcg total) by mouth once daily., Disp: 90 tablet, Rfl: 1    traZODone (DESYREL) 50 MG tablet, Take 1 tablet (50 mg total) by mouth nightly as needed for Insomnia., Disp: 30 tablet, Rfl: 3    valsartan-hydrochlorothiazide (DIOVAN-HCT) 160-12.5 mg per tablet, Take 1 tablet by mouth once daily., Disp: 90 tablet, Rfl: 1    betamethasone valerate 0.1% (VALISONE) 0.1 % Crea, Apply topically 2 (two) times daily. for 10 days, Disp: 45 g, Rfl: 0    OBJECTIVE:   Vitals:    10/29/18 0834   BP: 136/80   BP Location: Left arm   Patient Position: Sitting   BP Method: Large (Manual)   Pulse: 97   Temp: 97.6 °F (36.4 °C)   TempSrc: Oral   SpO2: 98%   Weight: (!) 144.9 kg (319 lb 6.4 oz)   Height: 5' 10.5" (1.791 m)     Body mass index is 45.18 kg/m².    Physical Exam   Constitutional: No distress.   Cardiovascular: Normal rate, regular rhythm, normal heart sounds and intact distal pulses.   Pulmonary/Chest: Effort normal and breath sounds normal.   Skin: Skin is warm.   Psychiatric: Her behavior is normal. Thought content normal. Cognition and memory are normal. She expresses no suicidal ideation. She expresses no suicidal plans.   Mood: pleased "I feel like me again"  Affect: improved from previous visits, smiles and not tearful as she has been on previous visit         PERTINENT RESULTS:   10/22/2018   US LOWER EXTREMITY VEINS LEFT    CLINICAL HISTORY:  R/O DVT; Varicose veins of left lower extremity with ulcer other part of lower leg    TECHNIQUE:  Duplex and color flow Doppler evaluation and graded compression of the left lower extremity veins was performed.    COMPARISON:  06/15/2018    FINDINGS:  Left thigh veins: The common femoral, femoral, popliteal, upper greater saphenous, and deep femoral veins are patent and free of thrombus. The " veins are normally compressible and have normal phasic flow and augmentation response.    Left calf veins: The visualized calf veins are patent.    Contralateral CFV: The contralateral (right) common femoral vein is patent and free of thrombus.    Miscellaneous: None      Impression       No evidence of deep venous thrombosis in the left lower extremity.       ASSESSMENT:  Problem List Items Addressed This Visit        Neuro    Chronic pain disorder (Chronic)    Current Assessment & Plan     - was previously followed by [facility]  - reviewed CT lumbar with mild DJD noted, Xray knees noted prosthesis  - unknown etiology of chronic pain whether from weight, OA, fibromyalgia or combination   - she is no longer able to see her Pain Management Specialist 2/2 insurance  - she seems to have improved with Dilaudid 2 mg BID by Wound Care and discussed that she will need formal evaluation of etiology of pain and rec that no other treatment was effective and she is a candidate for chronic opioid treatment if she is to continue  - I would not mind assuming care but I would need a formal recommendation by her Pain Management Specialist with etiology of pain         Relevant Medications    gabapentin (NEURONTIN) 600 MG tablet    Other Relevant Orders    Ambulatory Referral to Pain Clinic       Psychiatric    Recurrent major depressive disorder, in partial remission    Current Assessment & Plan     - improved control  - continue Cymbalta 60 mg daily         Relevant Medications    DULoxetine (CYMBALTA) 60 MG capsule    Opioid dependence       Cardiac/Vascular    Essential hypertension - Primary (Chronic)    Current Assessment & Plan     - improved control noted with improved pain control  - continue current meds         Relevant Medications    valsartan-hydrochlorothiazide (DIOVAN-HCT) 160-12.5 mg per tablet    diltiaZEM (CARDIZEM CD) 240 MG 24 hr capsule    carvedilol (COREG) 3.125 MG tablet       Endocrine     Hypothyroidism due to acquired atrophy of thyroid (Chronic)    Current Assessment & Plan     - TSH at goal  - continue Lexothyroxine 100 mcg daily         Relevant Medications    levothyroxine (SYNTHROID) 100 MCG tablet       GI    Chronic hepatitis C without hepatic coma (Chronic)    Overview     - 7/2018 GI eval: Type 3 with minimal fibrosis but +active inflammation          Current Assessment & Plan     - pt reports that she has appt at the end of this month for Hep C treatment evaluation         Relevant Orders    Hepatitis B surface antibody    Hepatitis B surface antigen    Elevated LFTs    Current Assessment & Plan     - improved from previous testing  - pt to f/u with Hepatology for Hep C treatment  - check for Hepatitis B immunity                PLAN:   Orders Placed This Encounter    Hepatitis B surface antibody    Hepatitis B surface antigen    Ambulatory Referral to Pain Clinic    valsartan-hydrochlorothiazide (DIOVAN-HCT) 160-12.5 mg per tablet    diltiaZEM (CARDIZEM CD) 240 MG 24 hr capsule    carvedilol (COREG) 3.125 MG tablet    gabapentin (NEURONTIN) 600 MG tablet    DULoxetine (CYMBALTA) 60 MG capsule    levothyroxine (SYNTHROID) 100 MCG tablet     Follow-up in 1 month.     Dr. Rosario Lewis D.O.   Family Medicine

## 2018-10-29 ENCOUNTER — OFFICE VISIT (OUTPATIENT)
Dept: FAMILY MEDICINE | Facility: CLINIC | Age: 53
End: 2018-10-29
Payer: MEDICAID

## 2018-10-29 ENCOUNTER — HOSPITAL ENCOUNTER (OUTPATIENT)
Dept: WOUND CARE | Facility: HOSPITAL | Age: 53
Discharge: HOME | End: 2018-10-29
Attending: EMERGENCY MEDICINE
Payer: MEDICAID

## 2018-10-29 VITALS — SYSTOLIC BLOOD PRESSURE: 138 MMHG | DIASTOLIC BLOOD PRESSURE: 73 MMHG | TEMPERATURE: 98 F | HEART RATE: 95 BPM

## 2018-10-29 VITALS
DIASTOLIC BLOOD PRESSURE: 80 MMHG | TEMPERATURE: 98 F | HEIGHT: 71 IN | OXYGEN SATURATION: 98 % | HEART RATE: 97 BPM | BODY MASS INDEX: 41.02 KG/M2 | WEIGHT: 293 LBS | SYSTOLIC BLOOD PRESSURE: 136 MMHG

## 2018-10-29 DIAGNOSIS — F33.1 MAJOR DEPRESSIVE DISORDER, RECURRENT EPISODE, MODERATE: Chronic | ICD-10-CM

## 2018-10-29 DIAGNOSIS — I83.028 VENOUS STASIS ULCER OF OTHER PART OF LEFT LOWER LEG LIMITED TO BREAKDOWN OF SKIN WITH VARICOSE VEINS: Primary | ICD-10-CM

## 2018-10-29 DIAGNOSIS — G89.4 CHRONIC PAIN DISORDER: Chronic | ICD-10-CM

## 2018-10-29 DIAGNOSIS — I10 ESSENTIAL HYPERTENSION: Primary | Chronic | ICD-10-CM

## 2018-10-29 DIAGNOSIS — L97.821 VENOUS STASIS ULCER OF OTHER PART OF LEFT LOWER LEG LIMITED TO BREAKDOWN OF SKIN WITH VARICOSE VEINS: Primary | ICD-10-CM

## 2018-10-29 DIAGNOSIS — B18.2 CHRONIC HEPATITIS C WITHOUT HEPATIC COMA: Chronic | ICD-10-CM

## 2018-10-29 DIAGNOSIS — F11.20 UNCOMPLICATED OPIOID DEPENDENCE: ICD-10-CM

## 2018-10-29 DIAGNOSIS — F33.41 RECURRENT MAJOR DEPRESSIVE DISORDER, IN PARTIAL REMISSION: ICD-10-CM

## 2018-10-29 DIAGNOSIS — E03.4 HYPOTHYROIDISM DUE TO ACQUIRED ATROPHY OF THYROID: Chronic | ICD-10-CM

## 2018-10-29 DIAGNOSIS — R79.89 ELEVATED LFTS: ICD-10-CM

## 2018-10-29 PROBLEM — R00.2 PALPITATIONS: Status: RESOLVED | Noted: 2018-10-15 | Resolved: 2018-10-29

## 2018-10-29 PROCEDURE — 99213 OFFICE O/P EST LOW 20 MIN: CPT | Mod: S$PBB,,, | Performed by: FAMILY MEDICINE

## 2018-10-29 PROCEDURE — 99214 OFFICE O/P EST MOD 30 MIN: CPT | Mod: 27

## 2018-10-29 PROCEDURE — 99214 OFFICE O/P EST MOD 30 MIN: CPT | Mod: PBBFAC,PN | Performed by: FAMILY MEDICINE

## 2018-10-29 PROCEDURE — 99999 PR PBB SHADOW E&M-EST. PATIENT-LVL IV: CPT | Mod: PBBFAC,,, | Performed by: FAMILY MEDICINE

## 2018-10-29 RX ORDER — VALSARTAN AND HYDROCHLOROTHIAZIDE 160; 12.5 MG/1; MG/1
1 TABLET, FILM COATED ORAL DAILY
Qty: 90 TABLET | Refills: 1 | Status: SHIPPED | OUTPATIENT
Start: 2018-10-29 | End: 2019-02-19 | Stop reason: SDUPTHER

## 2018-10-29 RX ORDER — DILTIAZEM HYDROCHLORIDE 240 MG/1
240 CAPSULE, COATED, EXTENDED RELEASE ORAL DAILY
Qty: 90 CAPSULE | Refills: 1 | Status: SHIPPED | OUTPATIENT
Start: 2018-10-29 | End: 2019-02-19 | Stop reason: SDUPTHER

## 2018-10-29 RX ORDER — GABAPENTIN 600 MG/1
600 TABLET ORAL
Qty: 180 TABLET | Refills: 2 | Status: SHIPPED | OUTPATIENT
Start: 2018-10-29 | End: 2018-12-27 | Stop reason: SDUPTHER

## 2018-10-29 RX ORDER — DULOXETIN HYDROCHLORIDE 60 MG/1
60 CAPSULE, DELAYED RELEASE ORAL DAILY
Qty: 90 CAPSULE | Refills: 1 | Status: SHIPPED | OUTPATIENT
Start: 2018-10-29 | End: 2019-02-19 | Stop reason: SDUPTHER

## 2018-10-29 RX ORDER — CARVEDILOL 3.12 MG/1
3.12 TABLET ORAL 2 TIMES DAILY
Qty: 180 TABLET | Refills: 1 | Status: SHIPPED | OUTPATIENT
Start: 2018-10-29 | End: 2019-02-19 | Stop reason: SDUPTHER

## 2018-10-29 RX ORDER — LEVOTHYROXINE SODIUM 100 UG/1
100 TABLET ORAL DAILY
Qty: 90 TABLET | Refills: 1 | Status: SHIPPED | OUTPATIENT
Start: 2018-10-29 | End: 2019-02-19 | Stop reason: SDUPTHER

## 2018-10-29 NOTE — ASSESSMENT & PLAN NOTE
- was previously followed by [facility]  - reviewed CT lumbar with mild DJD noted, Xray knees noted prosthesis  - unknown etiology of chronic pain whether from weight, OA, fibromyalgia or combination   - she is no longer able to see her Pain Management Specialist 2/2 insurance  - she seems to have improved with Dilaudid 2 mg BID by Wound Care and discussed that she will need formal evaluation of etiology of pain and rec that no other treatment was effective and she is a candidate for chronic opioid treatment if she is to continue  - I would not mind assuming care but I would need a formal recommendation by her Pain Management Specialist with etiology of pain

## 2018-10-29 NOTE — PROGRESS NOTES
Subjective:      Subjective: Pt. C/o severe wound site pain unrelieved by current Rx; Pain worse recently.       Patient ID: Danika Voss is a 53 y.o. female.    Chief Complaint: No chief complaint on file.    Bilateral venous ulcers; right leg started within last couple of weeks; left leg started nearly 2 year ago, clears up with local treatment then returns per patient unna boots help.  Hx of HTN on heart monitor for 30 days until 11/16/18.      Review of Systems    Objective: Mod. LE erythema & hyperemia of LE; Marilu's sign present.      Physical Exam    Assessment:       Wound #1 Lower Leg  Other Orders  Edema Control  Bilateral Extremities - Tubi  F toes to knee  Wound #2 Right Lower Leg  Other Orders  Edema Control  Bilateral Extremities - Tubi  F toes to knee  Additional Orders  Other Orders  Dressings & Wound Care  Cleanse wound with Normal Saline.  May shower  Other: - Iodosorb to wound cover with abd, secrue with kerlix and tape. BLE Tubi  F toes to knee  Change dressing every: - Wednesday, Friday per patient. Demonstrated in clinic verbalized understanding  Follow-Up Appointments  Return Appointment in 1 week. - Mondays Dr. Ibrahim  Other: - Culture of wound taken in clinic today 11/05/18    LLE ordered today             Plan:            Prescriptions for Doxycycline 100mg PO one tab BID and Dilaudid 2mg PO one tab BID ordered in clinic today by Dr. Ibrahim.    Follow-up in about 1 week (around 11/5/2018).

## 2018-10-29 NOTE — ASSESSMENT & PLAN NOTE
- improved from previous testing  - pt to f/u with Hepatology for Hep C treatment  - check for Hepatitis B immunity

## 2018-11-01 ENCOUNTER — TELEPHONE (OUTPATIENT)
Dept: FAMILY MEDICINE | Facility: CLINIC | Age: 53
End: 2018-11-01

## 2018-11-01 NOTE — TELEPHONE ENCOUNTER
----- Message from Rosario Lewis DO sent at 10/30/2018 12:52 PM CDT -----  Please call pt. Hepatitis B testing negative for infection but also negative for immunity. She will like need Hepatitis B immunization and recommend follow-up with Hepatology for her Hepatitis C treatment and hepatitis B immunizations

## 2018-11-01 NOTE — TELEPHONE ENCOUNTER
Spoke with patient and she has her appt with Hepatology set up for 11/7. Explained to her that she can go to any pharmacy to receive the Hep B vaccine series. Patient verbalized understanding

## 2018-11-09 ENCOUNTER — HOSPITAL ENCOUNTER (OUTPATIENT)
Dept: WOUND CARE | Facility: HOSPITAL | Age: 53
Discharge: HOME | End: 2018-11-09
Attending: EMERGENCY MEDICINE
Payer: MEDICAID

## 2018-11-09 VITALS
TEMPERATURE: 98 F | HEIGHT: 70 IN | BODY MASS INDEX: 41.95 KG/M2 | DIASTOLIC BLOOD PRESSURE: 119 MMHG | HEART RATE: 84 BPM | WEIGHT: 293 LBS | SYSTOLIC BLOOD PRESSURE: 181 MMHG

## 2018-11-09 DIAGNOSIS — L97.821 VENOUS STASIS ULCER OF OTHER PART OF LEFT LOWER LEG LIMITED TO BREAKDOWN OF SKIN WITH VARICOSE VEINS: Primary | ICD-10-CM

## 2018-11-09 DIAGNOSIS — I83.028 VENOUS STASIS ULCER OF OTHER PART OF LEFT LOWER LEG LIMITED TO BREAKDOWN OF SKIN WITH VARICOSE VEINS: Primary | ICD-10-CM

## 2018-11-09 PROCEDURE — 99213 OFFICE O/P EST LOW 20 MIN: CPT

## 2018-11-09 RX ORDER — HYDROXYZINE HYDROCHLORIDE 50 MG/1
50 TABLET, FILM COATED ORAL 4 TIMES DAILY PRN
Qty: 30 TABLET | Refills: 1 | Status: SHIPPED | OUTPATIENT
Start: 2018-11-09 | End: 2018-12-14 | Stop reason: SDUPTHER

## 2018-11-09 RX ORDER — HYDROMORPHONE HYDROCHLORIDE 2 MG/1
2 TABLET ORAL EVERY 12 HOURS PRN
Qty: 20 TABLET | Refills: 0 | Status: SHIPPED | OUTPATIENT
Start: 2018-11-09 | End: 2018-11-19

## 2018-11-09 NOTE — PROGRESS NOTES
[facility]                Progress Note    Subjective:  Pt. Reports < pain @ wound site, & that pain management referral per her PMD is unsuccessful so far due to ins. Status, as usual.       Patient ID: Danika Voss is a 53 y.o. female.    Chief Complaint: Venous Stasis (bilateral lower legs)    HPI    Review of Systems      Objective:Wounds appear resolved except for scattered dry, brown, adherent eschars @ wound site. Some oneal-wound tenderness & LLE pre-tibial pitting edema persist w/o Marilu's sign or s/s of infection.  Wound care as ordered; hydromorphone 2mg. Refilled x #20.  Will re-eval. In 1 wk.        Physical Exam    Vitals:    11/09/18 0831   BP: (!) 181/119   Pulse: 84   Temp: 97.7 °F (36.5 °C)       Assessment:           ICD-10-CM ICD-9-CM   1. Venous stasis ulcer of other part of left lower leg limited to breakdown of skin with varicose veins I83.028 454.0    L97.821             Wound 02/01/16 1200 Ulceration lower shin (Active)   02/01/16 1200    Pre-existing: Yes   Primary Wound Type:    Side: Left   Orientation: lower   Location: shin   Wound/PI Number (optional):    Ankle-Brachial Index:    Pulses:    Removal Indication and Assessment:    Wound Outcome:    (Retired) Wound Type: Ulceration   (Retired) Wound Length (cm):    (Retired) Wound Width (cm):    (Retired) Depth (cm):    Wound Description (Comments):    Removal Indications:    Dressing Appearance Intact;Dried drainage 11/9/2018  9:15 AM   Drainage Amount Scant 11/9/2018  9:15 AM   Drainage Characteristics/Odor Serosanguineous 11/9/2018  9:15 AM   Appearance Red 11/9/2018  9:15 AM   Tissue loss description Partial thickness 11/9/2018  9:15 AM   Red (%), Wound Tissue Color 100 % 11/9/2018  9:15 AM   Periwound Area Excoriated;Redness 11/9/2018  9:15 AM   Wound Edges Irregular 11/9/2018  9:15 AM   Wound Length (cm) 6 cm 11/9/2018  9:15 AM   Wound Width (cm) 6 cm 11/9/2018  9:15 AM   Wound  Depth (cm) 0.1 cm 11/9/2018  9:15 AM   Wound Volume (cm^3) 3.6 cm^3 11/9/2018  9:15 AM   Care Cleansed with:;Sterile normal saline 11/9/2018  9:15 AM   Dressing Applied;Foam;Tubular bandage;Other (see comments) 11/9/2018  1:22 PM   Dressing Change Due 11/11/18 11/9/2018  1:22 PM      Clean with Normal saline. Betamethasone to left lower leg. Sween cream to dry skin bilateral lower legs. Aquacel foam x1 over open areas, Tubigrip F toes to knee to bilateral lower legs. Patient to change dressing to left lower leg every other day. Next appt. Dr. Ibrahim Friday 11/16/18. Rx for pain provided per Dr. Ibrahim. Antibiotics completed.    Plan:                  [unfilled]

## 2018-11-16 ENCOUNTER — HOSPITAL ENCOUNTER (OUTPATIENT)
Dept: WOUND CARE | Facility: HOSPITAL | Age: 53
Discharge: HOME | End: 2018-11-16
Attending: EMERGENCY MEDICINE
Payer: MEDICAID

## 2018-11-16 VITALS
DIASTOLIC BLOOD PRESSURE: 92 MMHG | WEIGHT: 293 LBS | HEART RATE: 81 BPM | TEMPERATURE: 98 F | BODY MASS INDEX: 41.95 KG/M2 | SYSTOLIC BLOOD PRESSURE: 156 MMHG | HEIGHT: 70 IN

## 2018-11-16 DIAGNOSIS — L97.821 VENOUS STASIS ULCER OF OTHER PART OF LEFT LOWER LEG LIMITED TO BREAKDOWN OF SKIN WITH VARICOSE VEINS: Primary | ICD-10-CM

## 2018-11-16 DIAGNOSIS — I83.028 VENOUS STASIS ULCER OF OTHER PART OF LEFT LOWER LEG LIMITED TO BREAKDOWN OF SKIN WITH VARICOSE VEINS: Primary | ICD-10-CM

## 2018-11-16 PROCEDURE — 99213 OFFICE O/P EST LOW 20 MIN: CPT

## 2018-11-16 NOTE — PROGRESS NOTES
[facility]                Progress Note    Subjective:       Patient ID: Danika Voss is a 53 y.o. female.    Chief Complaint: Venous Stasis (left lower leg)      Review of Systems      Objective:        Physical Exam    Vitals:    11/16/18 0843   BP: (!) 156/92   Pulse: 81   Temp: 98 °F (36.7 °C)       Assessment:           ICD-10-CM ICD-9-CM   1. Venous stasis ulcer of other part of left lower leg limited to breakdown of skin with varicose veins I83.028 454.0    L97.821             Wound 10/29/18 1200 Abrasion(s) lower Leg #1 (Active)   10/29/18 1200    Pre-existing: Yes   Primary Wound Type: Abrasion(s)   Side: Left   Orientation: lower   Location: Leg   Wound/PI Number (optional): #1   Ankle-Brachial Index:    Pulses:    Removal Indication and Assessment:    Wound Outcome:    (Retired) Wound Type:    (Retired) Wound Length (cm):    (Retired) Wound Width (cm):    (Retired) Depth (cm):    Wound Description (Comments):    Removal Indications:    Dressing Appearance Dry;Intact;Clean 11/16/2018  9:12 AM   Drainage Amount None 11/16/2018  9:12 AM   Appearance Pink;Red;Moist;Granulating 11/16/2018  9:12 AM   Tissue loss description Partial thickness 11/16/2018  9:12 AM   Red (%), Wound Tissue Color 100 % 11/16/2018  9:12 AM   Periwound Area Intact;Dry 11/16/2018  9:12 AM   Wound Edges Irregular 11/16/2018  9:12 AM   Wound Length (cm) 5 cm 11/16/2018  9:12 AM   Wound Width (cm) 2 cm 11/16/2018  9:12 AM   Wound Depth (cm) 0.1 cm 11/16/2018  9:12 AM   Wound Volume (cm^3) 1 cm^3 11/16/2018  9:12 AM   Care Cleansed with:;Sterile normal saline 11/16/2018  9:12 AM   Dressing Foam;Tubular bandage;Other (see comments) 11/16/2018  9:12 AM   Periwound Care Moisturizer applied 11/16/2018  9:12 AM      Clean left sites with NS. Sween cream to oneal area, and betamethasone to venous excoriations. Cover with aquacel foam. Tubigrip F to bilateral lower legs. Next appt. Dr. Ibrahim  11/30/18.     Plan:                  [unfilled]

## 2018-11-16 NOTE — PROGRESS NOTES
Pt. C/o persistent wound site pain, & states that she was unable to completely fill her Rx for hydromorphone due to cost.  She states that she is still awaiting pain management referral w/o results.  Wounds 100% epithelialized w/o Marilu's sign or s/s of infection; 1+L pre-tibial pitting edema persists.  Wound care as per orders; will attempt pain management referral if not successful by next visit.  Will re-start Percodan 10mg. q 6h. Prn. (#30)

## 2018-11-16 NOTE — PROGRESS NOTES
FAMILY MEDICINE    Patient Active Problem List   Diagnosis    Essential hypertension    Morbid obesity with BMI of 40.0-44.9, adult    Recurrent major depressive disorder, in partial remission    Hypothyroidism due to acquired atrophy of thyroid    Chronic hepatitis C without hepatic coma    Gastroesophageal reflux disease with esophagitis    Opioid dependence    Chronic pain disorder    Schafer's esophagus    Family history of colon cancer    Hypertensive left ventricular hypertrophy, without heart failure    Other insomnia    Venous stasis ulcer with varicose veins    Dermatitis    Elevated LFTs    Generalized anxiety disorder    Acute gastritis without hemorrhage       CC:   Chief Complaint   Patient presents with    Abdominal Pain       SUBJECTIVE:  Danika Voss   is a 53 y.o. female  - with HTN, depression, hypothyroidism, h/o SVT, chronic pain and venous statis with ulcer and MRSA infection (followed wound care) presents for abdominal pain that started about 3 days ago s/p starting Oxycodone/ASA.       Abdominal Pain   This is a new problem. Episode onset: x 1 day. The onset quality is sudden. The problem occurs constantly. The problem has been waxing and waning. The pain is located in the epigastric region. The pain is at a severity of 5/10. The pain is moderate. The quality of the pain is burning. The abdominal pain does not radiate. Associated symptoms include anorexia, belching and nausea. Pertinent negatives include no arthralgias, constipation, diarrhea, dysuria, fever, flatus, frequency, headaches, hematochezia, hematuria, melena, myalgias, vomiting or weight loss. The pain is aggravated by NSAIDs (and aspirin which she started yesterday). The pain is relieved by nothing. She has tried nothing for the symptoms.       ROS: Review of Systems   Constitutional: Positive for appetite change. Negative for activity change, fatigue, fever, unexpected weight change and weight loss.    Respiratory: Negative for cough, chest tightness and shortness of breath.    Cardiovascular: Negative for chest pain, palpitations and leg swelling.   Gastrointestinal: Positive for abdominal pain, anorexia and nausea. Negative for anal bleeding, blood in stool, constipation, diarrhea, flatus, hematochezia, melena and vomiting.   Genitourinary: Negative for difficulty urinating, dysuria, frequency and hematuria.   Musculoskeletal: Negative for arthralgias and myalgias.   Neurological: Negative for dizziness, weakness, light-headedness and headaches.       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    MRSA, Peptostreptococcus    NSAID induced gastritis 9/8/2015    Obesity     Parkinson disease 11/17/2015       Past Surgical History:   Procedure Laterality Date    ABCESS DRAINAGE Left 8/6/2015    left knee washout    CARDIAC SURGERY  4/7/2010    artery on wrong side, performed in [place]    CHOLECYSTECTOMY  3/2010    COLONOSCOPY N/A 7/27/2018    Procedure: COLONOSCOPY;  Surgeon: Teresa Sena MD;  Location: [facility];  Service: Endoscopy;  Laterality: N/A;    COLONOSCOPY N/A 7/27/2018    [facility]    ESOPHAGOGASTRODUODENOSCOPY N/A 7/17/2018    Procedure: ESOPHAGOGASTRODUODENOSCOPY (EGD);  Surgeon: Teresa Sena MD;  Location: [facility];  Service: Endoscopy;  Laterality: N/A;    ESOPHAGOGASTRODUODENOSCOPY (EGD) N/A 7/17/2018    [facility]    HYSTERECTOMY      No cervix    INCISION AND DRAINAGE LEG Left 2/2/2016    [facility]uchs Jr[facility] at [facility] OR    INCISION AND DRAINAGE-KNEE Left 8/6/2015    [facility] at [facility] OR     PERIPHERALLY INSERTED CENTRAL CATHETER INSERTION Right 8/6/2015    PLACEMENT-WOUND VAC Left 2/2/2016    Performed by [facility]ed [facility] Jr[facility] at [facility] OR    REVISION-ARTHROPLASTY-KNEE-TOTAL Left 7/21/2015    Performed by Brittany Walsh [facility] at [facility] OR    shoulder cyst removal Left 2014    [facility]    TONSILLECTOMY      TOTAL KNEE ARTHROPLASTY Bilateral right 6/2014, left 9/30/2014    TOTAL KNEE ARTHROPLASTY Left 7/21/2015    revision       ALLERGIES:   Review of patient's allergies indicates:   Allergen Reactions    Fentanyl Other (See Comments)     Fentanyl patches burn skin    Lisinopril Other (See Comments)     cough    Nsaids (non-steroidal anti-inflammatory drug) Other (See Comments)     gastritis    Toradol [ketorolac] Nausea And Vomiting and Rash       MEDS:   Current Outpatient Medications:     albuterol 90 mcg/actuation inhaler, Inhale 2 puffs into the lungs every 6 (six) hours as needed for Wheezing. Rescue, Disp: 18 g, Rfl: 1    betamethasone valerate 0.1% (VALISONE) 0.1 % Crea, Apply topically 2 (two) times daily. for 10 days, Disp: 45 g, Rfl: 0    carvedilol (COREG) 3.125 MG tablet, Take 1 tablet (3.125 mg total) by mouth 2 (two) times daily., Disp: 180 tablet, Rfl: 1    diltiaZEM (CARDIZEM CD) 240 MG 24 hr capsule, Take 1 capsule (240 mg total) by mouth once daily., Disp: 90 capsule, Rfl: 1    DULoxetine (CYMBALTA) 60 MG capsule, Take 1 capsule (60 mg total) by mouth once daily., Disp: 90 capsule, Rfl: 1    flecainide (TAMBOCOR) 50 MG Tab, Take 100 mg by mouth 2 (two) times daily., Disp: , Rfl:     gabapentin (NEURONTIN) 600 MG tablet, Take 1 tablet (600 mg total) by mouth 6 (six) times daily. TAKE 1 TABLET BY MOUTH 6 TIMES A DAY, Disp: 180 tablet, Rfl: 2    hydrOXYzine (ATARAX) 50 MG tablet, Take 1 tablet (50 mg total) by mouth 4 (four) times daily as needed for Itching., Disp: 30 tablet, Rfl: 1    levothyroxine (SYNTHROID) 100 MCG tablet,  "Take 1 tablet (100 mcg total) by mouth once daily., Disp: 90 tablet, Rfl: 1    oxycodone-aspirin 4.8355-325 mg (PERCODAN) 4.8355-325 mg Tab, Take 1 tablet by mouth every 6 (six) hours as needed., Disp: , Rfl: 0    valsartan-hydrochlorothiazide (DIOVAN-HCT) 160-12.5 mg per tablet, Take 1 tablet by mouth once daily., Disp: 90 tablet, Rfl: 1    pantoprazole (PROTONIX) 40 MG tablet, Take 1 tablet (40 mg total) by mouth once daily., Disp: 30 tablet, Rfl: 0    Current Facility-Administered Medications:     cadexomer iodine 0.9 % gel, , Topical (Top), Once, Patricio Ibrahim MD    OBJECTIVE:   Vitals:    11/19/18 0903 11/19/18 0958   BP: 130/89 130/78   BP Location: Left arm    Patient Position: Sitting    BP Method: Large (Manual)    Pulse: 82    Resp: 16    Temp: 97.9 °F (36.6 °C)    TempSrc: Oral    SpO2: 98%    Weight: (!) 147.8 kg (325 lb 12.8 oz)    Height: 5' 10" (1.778 m)      Body mass index is 46.75 kg/m².    Physical Exam   Constitutional: No distress.   Neck: Neck supple.   Cardiovascular: Normal rate, regular rhythm, normal heart sounds and intact distal pulses.   Pulmonary/Chest: Effort normal and breath sounds normal.   Abdominal: Soft. Normal appearance and bowel sounds are normal. There is tenderness in the epigastric area. There is no rigidity, no rebound and no guarding. No hernia.   Neurological: She is alert.   Skin: Skin is warm. Capillary refill takes less than 2 seconds.         PERTINENT RESULTS:   7/17/2018   Upper GI endoscopy   Impression:           - Esophageal mucosal changes suspicious for                         short-segment Schafer's esophagus. Biopsied.                        - Erythematous mucosa in the antrum. Biopsied.                        - Normal examined duodenum     ASSESSMENT:  Problem List Items Addressed This Visit        Neuro    Chronic pain disorder (Chronic)    Current Assessment & Plan     - currently followed by Wound Care  - Dilaudid changed to Oxycodone/ASA which " may be exacerbating her gastritis. Rec she discuss with wound care that she is unable to tolerate this medication            Cardiac/Vascular    Essential hypertension - Primary (Chronic)    Current Assessment & Plan     - continued improved control  - continue same meds            GI    Acute gastritis without hemorrhage    Current Assessment & Plan     - rec 7/2018 EGD  - likely exacerbated by ASA  - rec Pantoprazole 40 mg daily         Relevant Medications    pantoprazole (PROTONIX) 40 MG tablet          PLAN:   Orders Placed This Encounter    pantoprazole (PROTONIX) 40 MG tablet     Follow-up 2-3 months or sooner if no improvement  Counseling no severe symptoms including but not limited to fever, vomiting, bloody or dark tarry stool to proceed to the ER    Dr. Rosario Lewis D.O.   Family Medicine

## 2018-11-19 ENCOUNTER — OFFICE VISIT (OUTPATIENT)
Dept: FAMILY MEDICINE | Facility: CLINIC | Age: 53
End: 2018-11-19
Payer: MEDICAID

## 2018-11-19 VITALS
SYSTOLIC BLOOD PRESSURE: 130 MMHG | HEART RATE: 82 BPM | DIASTOLIC BLOOD PRESSURE: 78 MMHG | OXYGEN SATURATION: 98 % | TEMPERATURE: 98 F | WEIGHT: 293 LBS | RESPIRATION RATE: 16 BRPM | HEIGHT: 70 IN | BODY MASS INDEX: 41.95 KG/M2

## 2018-11-19 DIAGNOSIS — I10 ESSENTIAL HYPERTENSION: Primary | Chronic | ICD-10-CM

## 2018-11-19 DIAGNOSIS — G89.4 CHRONIC PAIN DISORDER: Chronic | ICD-10-CM

## 2018-11-19 DIAGNOSIS — K29.00 OTHER ACUTE GASTRITIS WITHOUT HEMORRHAGE: ICD-10-CM

## 2018-11-19 PROBLEM — F41.1 GENERALIZED ANXIETY DISORDER: Status: ACTIVE | Noted: 2018-11-19

## 2018-11-19 PROCEDURE — 99213 OFFICE O/P EST LOW 20 MIN: CPT | Mod: S$PBB,,, | Performed by: FAMILY MEDICINE

## 2018-11-19 PROCEDURE — 99215 OFFICE O/P EST HI 40 MIN: CPT | Mod: PBBFAC,PN | Performed by: FAMILY MEDICINE

## 2018-11-19 PROCEDURE — 99999 PR PBB SHADOW E&M-EST. PATIENT-LVL V: CPT | Mod: PBBFAC,,, | Performed by: FAMILY MEDICINE

## 2018-11-19 RX ORDER — OXYCODONE HYDROCHLORIDE AND ASPIRIN 4.8355; 325 MG/1; MG/1
1 TABLET ORAL EVERY 6 HOURS PRN
Refills: 0 | COMMUNITY
Start: 2018-11-17 | End: 2018-12-12

## 2018-11-19 RX ORDER — PANTOPRAZOLE SODIUM 40 MG/1
40 TABLET, DELAYED RELEASE ORAL DAILY
Qty: 30 TABLET | Refills: 0 | Status: SHIPPED | OUTPATIENT
Start: 2018-11-19 | End: 2019-02-19 | Stop reason: ALTCHOICE

## 2018-11-19 NOTE — ASSESSMENT & PLAN NOTE
- currently followed by Wound Care  - Dilaudid changed to Oxycodone/ASA which may be exacerbating her gastritis. Rec she discuss with wound care that she is unable to tolerate this medication

## 2018-11-20 ENCOUNTER — TELEPHONE (OUTPATIENT)
Dept: FAMILY MEDICINE | Facility: CLINIC | Age: 53
End: 2018-11-20

## 2018-11-20 NOTE — TELEPHONE ENCOUNTER
Patient states that she reached out to the wound care provider to get a prescription for tramadol and he is out for 2 weeks. She would like to know if you can give her the prescription.     Pharm: [facility]

## 2018-11-20 NOTE — TELEPHONE ENCOUNTER
----- Message from Anna Rutherford sent at 11/20/2018 10:34 AM CST -----  Contact: [phone]/self  Pt would like to speak with you concerning a question she has about yesterdays appt. Please call and advise.

## 2018-11-21 ENCOUNTER — TELEPHONE (OUTPATIENT)
Dept: FAMILY MEDICINE | Facility: CLINIC | Age: 53
End: 2018-11-21

## 2018-11-21 NOTE — TELEPHONE ENCOUNTER
Spoke with patient and explained to her that there is someone covering for her wound care provider and a message has been sent, they are just waiting on a response. Patient verbalized understanding

## 2018-11-21 NOTE — TELEPHONE ENCOUNTER
----- Message from Pam Hoskins sent at 11/21/2018 10:48 AM CST -----  Contact: [phone]/self  Patient is requesting a call back. She is in a lot of pain. Thanks

## 2018-11-23 ENCOUNTER — TELEPHONE (OUTPATIENT)
Dept: FAMILY MEDICINE | Facility: CLINIC | Age: 53
End: 2018-11-23

## 2018-11-23 NOTE — TELEPHONE ENCOUNTER
----- Message from Prudence Hays sent at 11/21/2018  2:38 PM CST -----  Contact: [phone]  Pt called stating the wound care doctor its gone for the day and she needs a prescription for rx tramadol because she is in severe pain . Please advise

## 2018-11-23 NOTE — TELEPHONE ENCOUNTER
Spoke to the wound care department and the patient left several messages with them also. They contacted patient back and informed her to go to the ER since she is in severe pain.

## 2018-11-29 PROCEDURE — 93272 ECG/REVIEW INTERPRET ONLY: CPT | Mod: ,,, | Performed by: INTERNAL MEDICINE

## 2018-11-30 ENCOUNTER — HOSPITAL ENCOUNTER (OUTPATIENT)
Dept: WOUND CARE | Facility: HOSPITAL | Age: 53
Discharge: HOME | End: 2018-11-30
Attending: EMERGENCY MEDICINE
Payer: MEDICAID

## 2018-11-30 VITALS
SYSTOLIC BLOOD PRESSURE: 148 MMHG | RESPIRATION RATE: 20 BRPM | DIASTOLIC BLOOD PRESSURE: 80 MMHG | HEIGHT: 70 IN | WEIGHT: 293 LBS | BODY MASS INDEX: 41.95 KG/M2 | TEMPERATURE: 98 F | HEART RATE: 92 BPM

## 2018-11-30 DIAGNOSIS — L97.821 VENOUS STASIS ULCER OF OTHER PART OF LEFT LOWER LEG LIMITED TO BREAKDOWN OF SKIN WITH VARICOSE VEINS: Primary | ICD-10-CM

## 2018-11-30 DIAGNOSIS — I83.028 VENOUS STASIS ULCER OF OTHER PART OF LEFT LOWER LEG LIMITED TO BREAKDOWN OF SKIN WITH VARICOSE VEINS: Primary | ICD-10-CM

## 2018-11-30 PROCEDURE — 99214 OFFICE O/P EST MOD 30 MIN: CPT | Mod: 27 | Performed by: EMERGENCY MEDICINE

## 2018-11-30 PROCEDURE — 99212 OFFICE O/P EST SF 10 MIN: CPT

## 2018-11-30 RX ORDER — HYDROMORPHONE HYDROCHLORIDE 2 MG/1
2 TABLET ORAL EVERY 4 HOURS PRN
Qty: 30 TABLET | Refills: 0 | Status: SHIPPED | OUTPATIENT
Start: 2018-11-30 | End: 2018-12-07 | Stop reason: SDUPTHER

## 2018-11-30 NOTE — PROGRESS NOTES
[facility]                Progress Note    Subjective:       Patient ID: Danika Voss is a 53 y.o. female.    Chief Complaint: Non-healing Wound    Patient presents to clinic with Venous Stasis (left lower leg). States she fell last week and c/o to right knee pain, area swollen, bruise noted to right knee. [place]        Review of Systems      Objective:        Physical Exam    Vitals:    11/30/18 0858   BP: (!) 148/80   Pulse: 92   Resp: 20   Temp: 97.8 °F (36.6 °C)       Assessment:         Venous stasis ulcer of other part of left lower leg limited to breakdown of skin with varicose veins.         Cleanse wound with: Normal saline  Lidocaine: N/A  Silver nitrate:N/A  Periwound care:Sween cream  Primary dressing: Betamethasone to venous excoriations, cover with Aquacel foam  Secondary dressing: tubigrip F toes to knee  Offloading: N/A  Edema control: Tubigrip F bilateral lower legs  Frequency: Every 3rd day  Follow-up: Dr. Ibrahim Friday 12/07/18  Home Health:N/A  Other Orders: Elevate legs as much as possible      Plan:          Pain management follow up on 01/24/2019.        Follow-up in about 1 week (around 12/7/2018).

## 2018-11-30 NOTE — PROGRESS NOTES
Pt. Reports < wound site pain, but states that she tripped over carpet & fell directly on her R knee.  She has been ambulatory since fall, but reports increasing R knee pain since fall.  She is c.2yrs. S/p R total knee replacement.  L ankle wound appears 100% epithelialized w/o s/s of infection.  A mild L knee effusion is present w/ AROM c.90 degrees.  Mod. prepatellar tenderness also noted w/o deformity or distal vascular or neuro. Deficit.  Wound care as ordered w/ f/u as per appt.  Pt. Advised to use knee immobilizer on L knee.  Dilaudid 2mg. Refilled x #30.  Pt. Also advised to f/u w/ Pain Management as per appt. On 1/24/19.  Will consider imaging and/or Orthopaedic consult if no improvement.

## 2018-12-07 ENCOUNTER — HOSPITAL ENCOUNTER (OUTPATIENT)
Dept: WOUND CARE | Facility: HOSPITAL | Age: 53
Discharge: HOME | End: 2018-12-07
Attending: EMERGENCY MEDICINE
Payer: MEDICAID

## 2018-12-07 VITALS
WEIGHT: 293 LBS | DIASTOLIC BLOOD PRESSURE: 97 MMHG | HEIGHT: 70 IN | BODY MASS INDEX: 41.95 KG/M2 | HEART RATE: 90 BPM | SYSTOLIC BLOOD PRESSURE: 147 MMHG | TEMPERATURE: 97 F

## 2018-12-07 DIAGNOSIS — F41.1 GENERALIZED ANXIETY DISORDER: ICD-10-CM

## 2018-12-07 DIAGNOSIS — I11.9 HYPERTENSIVE LEFT VENTRICULAR HYPERTROPHY, WITHOUT HEART FAILURE: ICD-10-CM

## 2018-12-07 DIAGNOSIS — I83.028 VENOUS STASIS ULCER OF OTHER PART OF LEFT LOWER LEG LIMITED TO BREAKDOWN OF SKIN WITH VARICOSE VEINS: Primary | ICD-10-CM

## 2018-12-07 DIAGNOSIS — E66.01 MORBID OBESITY WITH BMI OF 40.0-44.9, ADULT: Chronic | ICD-10-CM

## 2018-12-07 DIAGNOSIS — L97.821 VENOUS STASIS ULCER OF OTHER PART OF LEFT LOWER LEG LIMITED TO BREAKDOWN OF SKIN WITH VARICOSE VEINS: Primary | ICD-10-CM

## 2018-12-07 PROCEDURE — 99213 OFFICE O/P EST LOW 20 MIN: CPT

## 2018-12-07 RX ORDER — HYDROMORPHONE HYDROCHLORIDE 2 MG/1
2 TABLET ORAL EVERY 4 HOURS PRN
Qty: 30 TABLET | Refills: 0 | Status: SHIPPED | OUTPATIENT
Start: 2018-12-07 | End: 2018-12-14 | Stop reason: SDUPTHER

## 2018-12-07 NOTE — PROGRESS NOTES
Subjective:       Patient ID: Danika Voss is a 53 y.o. female.    Chief Complaint: Venous Stasis    Chief Complaint: Non-healing Wound     Patient presents to clinic with Venous Stasis (left lower leg). States she fell last week and c/o to right knee pain, area swollen, bruise noted to right knee. Hx. right knee [place] ago [place].          Review of Systems  As above; no change in R knee pain since fall.  Objective:    L ankle wounds nearly 100% epithelialized. Mod. R prepatellar tenderness w/o open wound or s/s of infection. A mod. R knee effussion is present w/ AROM c.0-40 degrees.  No deformity seen.  Physical Exam    Assessment:       1. Venous stasis ulcer of other part of left lower leg limited to breakdown of skin with varicose veins    2. Hypertensive left ventricular hypertrophy, without heart failure    3. Generalized anxiety disorder    4. Morbid obesity with BMI of 40.0-44.9, adult           Wound 10/29/18 1200 Abrasion(s) lower Leg #1 (Active)   10/29/18 1200    Pre-existing: Yes   Primary Wound Type: Abrasion(s)   Side: Left   Orientation: lower   Location: Leg   Wound/PI Number (optional): #1   Ankle-Brachial Index:    Pulses:    Removal Indication and Assessment:    Wound Outcome:    (Retired) Wound Type:    (Retired) Wound Length (cm):    (Retired) Wound Width (cm):    (Retired) Depth (cm):    Wound Description (Comments):    Removal Indications:    Wound Image   12/7/2018  8:55 AM   Dressing Appearance Open to air 12/7/2018  8:55 AM   Drainage Amount None 12/7/2018  8:55 AM   Appearance Pink;Dry 12/7/2018  8:55 AM   Tissue loss description Partial thickness 12/7/2018  8:55 AM   Red (%), Wound Tissue Color 25 % 12/7/2018  8:55 AM   Periwound Area Pink;Warm;Edematous 12/7/2018  8:55 AM   Wound Edges Irregular 12/7/2018  8:55 AM   Wound Length (cm) 5.5 cm 12/7/2018  8:55 AM   Wound Width (cm) 12 cm 12/7/2018  8:55 AM   Wound Depth (cm) 0.1 cm 12/7/2018  8:55 AM   Wound Volume  (cm^3) 6.6 cm^3 12/7/2018  8:55 AM   Wound Surface Area (cm^2) 66 cm^2 12/7/2018  8:55 AM   Care Cleansed with:;Sterile normal saline 12/7/2018  8:55 AM   Dressing Applied 12/7/2018  8:55 AM   Compression Tubular elasticized bandage 12/7/2018  8:55 AM   Dressing Change Due 12/08/18 12/7/2018  8:55 AM   Wound #1: Left lower leg venous ulcer     Cleanse wound with: Normal saline  Periwound care:Sween cream  Primary dressing: Betamethasone to venous excoriations, cover with Aquacel foam  tubigrip F toes to knee both legs  Rx sent to patients pharmacy for Dilaudid 2mg#30) X-ray of right leg ordered.    Plan:                Follow up with Dr. Ibrahim 12/14/18

## 2018-12-12 ENCOUNTER — OFFICE VISIT (OUTPATIENT)
Dept: CARDIOLOGY | Facility: CLINIC | Age: 53
End: 2018-12-12
Payer: MEDICAID

## 2018-12-12 VITALS
SYSTOLIC BLOOD PRESSURE: 136 MMHG | OXYGEN SATURATION: 98 % | BODY MASS INDEX: 41.95 KG/M2 | HEIGHT: 70 IN | DIASTOLIC BLOOD PRESSURE: 70 MMHG | HEART RATE: 81 BPM | WEIGHT: 293 LBS

## 2018-12-12 DIAGNOSIS — I11.9 HYPERTENSIVE LEFT VENTRICULAR HYPERTROPHY, WITHOUT HEART FAILURE: ICD-10-CM

## 2018-12-12 DIAGNOSIS — E66.01 MORBID OBESITY WITH BMI OF 40.0-44.9, ADULT: Chronic | ICD-10-CM

## 2018-12-12 DIAGNOSIS — F41.1 GENERALIZED ANXIETY DISORDER: Primary | ICD-10-CM

## 2018-12-12 DIAGNOSIS — I10 ESSENTIAL HYPERTENSION: Chronic | ICD-10-CM

## 2018-12-12 PROCEDURE — 99214 OFFICE O/P EST MOD 30 MIN: CPT | Mod: PBBFAC,PN | Performed by: INTERNAL MEDICINE

## 2018-12-12 PROCEDURE — 99999 PR PBB SHADOW E&M-EST. PATIENT-LVL IV: CPT | Mod: PBBFAC,,, | Performed by: INTERNAL MEDICINE

## 2018-12-12 PROCEDURE — 99215 OFFICE O/P EST HI 40 MIN: CPT | Mod: S$PBB,,, | Performed by: INTERNAL MEDICINE

## 2018-12-12 NOTE — PATIENT INSTRUCTIONS
Assessment/Plan:  Danika Voss is a 53 y.o. female with a past medical history of HTN, cardiac surgery (artery on wrong side per pt), morbid obesity, who presents for a follow up  appointment.     1. Palpitations- Pt with symptomatic palpitations as described.  Check echo and 30 day event monitor to evaluate further.      2. HTN- Continue current regimen.       3. Morbid Obesity- Pt extensively counseled on the importance of diet, exercise and weight loss.  Continue to monitor BMI.     Follow up in 1 month
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
## 2018-12-14 ENCOUNTER — HOSPITAL ENCOUNTER (OUTPATIENT)
Dept: WOUND CARE | Facility: HOSPITAL | Age: 53
Discharge: HOME | End: 2018-12-14
Attending: EMERGENCY MEDICINE
Payer: MEDICAID

## 2018-12-14 VITALS
RESPIRATION RATE: 20 BRPM | HEART RATE: 82 BPM | TEMPERATURE: 98 F | DIASTOLIC BLOOD PRESSURE: 79 MMHG | HEIGHT: 70 IN | WEIGHT: 293 LBS | SYSTOLIC BLOOD PRESSURE: 135 MMHG | BODY MASS INDEX: 41.95 KG/M2

## 2018-12-14 DIAGNOSIS — I11.9 HYPERTENSIVE LEFT VENTRICULAR HYPERTROPHY, WITHOUT HEART FAILURE: ICD-10-CM

## 2018-12-14 DIAGNOSIS — L97.821 VENOUS STASIS ULCER OF OTHER PART OF LEFT LOWER LEG LIMITED TO BREAKDOWN OF SKIN WITH VARICOSE VEINS: Primary | ICD-10-CM

## 2018-12-14 DIAGNOSIS — F41.1 GENERALIZED ANXIETY DISORDER: ICD-10-CM

## 2018-12-14 DIAGNOSIS — E66.01 MORBID OBESITY WITH BMI OF 40.0-44.9, ADULT: ICD-10-CM

## 2018-12-14 DIAGNOSIS — I83.028 VENOUS STASIS ULCER OF OTHER PART OF LEFT LOWER LEG LIMITED TO BREAKDOWN OF SKIN WITH VARICOSE VEINS: Primary | ICD-10-CM

## 2018-12-14 PROCEDURE — 99212 OFFICE O/P EST SF 10 MIN: CPT

## 2018-12-14 RX ORDER — HYDROMORPHONE HYDROCHLORIDE 2 MG/1
2 TABLET ORAL EVERY 4 HOURS PRN
Qty: 30 TABLET | Refills: 0 | Status: SHIPPED | OUTPATIENT
Start: 2018-12-14 | End: 2018-12-21 | Stop reason: SDUPTHER

## 2018-12-14 RX ORDER — HYDROXYZINE HYDROCHLORIDE 50 MG/1
50 TABLET, FILM COATED ORAL 4 TIMES DAILY PRN
Qty: 30 TABLET | Refills: 1 | Status: SHIPPED | OUTPATIENT
Start: 2018-12-14 | End: 2019-06-20

## 2018-12-14 NOTE — PROGRESS NOTES
Subjective:       Patient ID: Danika Voss is a 53 y.o. female.    Chief Complaint: Non-healing Wound    Patient presents to clinic with Venous Stasis (left lower leg). States she fell on 11/28/2018. [place]  Presents with left lower leg small scratch areas (states it itches and she keeps scratching it), currently taking Atarax. C/o pain to LLE, (taking dilaudid 2mg tab every 4 hours as needed for pain). Pt requesting SSI disability form to be filled by Dr. Ibrahim in clinic today.    Pt. Reports < wound site pain than on prev. Visit.  Review of Systems    Objective:    Wounds appear smaller w/o Marilu's sign or s/s of infection; wounds largely epithelialized.  Physical Exam    Assessment:       1. Venous stasis ulcer of other part of left lower leg limited to breakdown of skin with varicose veins    2. Hypertensive left ventricular hypertrophy, without heart failure    3. Generalized anxiety disorder    4. Morbid obesity with BMI of 40.0-44.9, adult           Wound 10/29/18 1200 Abrasion(s) lower Leg #1 (Active)   10/29/18 1200    Pre-existing: Yes   Primary Wound Type: Abrasion(s)   Side: Left   Orientation: lower   Location: Leg   Wound/PI Number (optional): #1   Ankle-Brachial Index:    Pulses:    Removal Indication and Assessment:    Wound Outcome:    (Retired) Wound Type:    (Retired) Wound Length (cm):    (Retired) Wound Width (cm):    (Retired) Depth (cm):    Wound Description (Comments):    Removal Indications:    Dressing Appearance Open to air 12/14/2018  9:00 AM   Drainage Amount None 12/14/2018  9:00 AM   Appearance Pink;Red 12/14/2018  9:00 AM   Tissue loss description Partial thickness 12/14/2018  9:00 AM   Red (%), Wound Tissue Color 50 % 12/14/2018  9:00 AM   Periwound Area Pink;Pale white;Redness 12/14/2018  9:00 AM   Wound Edges Irregular 12/14/2018  9:00 AM   Wound Length (cm) 5.4 cm 12/14/2018  9:00 AM   Wound Width (cm) 11.9 cm 12/14/2018  9:00  AM   Wound Depth (cm) 0.1 cm 12/14/2018  9:00 AM   Wound Volume (cm^3) 6.43 cm^3 12/14/2018  9:00 AM   Wound Surface Area (cm^2) 64.26 cm^2 12/14/2018  9:00 AM   Care Cleansed with:;Sterile normal saline 12/14/2018  9:00 AM   Dressing Applied 12/14/2018  9:00 AM   Compression Tubular elasticized bandage 12/14/2018  9:00 AM   Dressing Change Due 12/17/18 12/14/2018  9:00 AM         Cleanse wound with: Normal saline  Periwound care:Sween cream  Primary dressing: Betamethasone to venous excoriations, cover with Aquacel foam  tubigrip F toes to knee both legs (2 tubing F to left lower leg and 1 to right lower leg)      Plan:            Follow-up in about 1 week (around 12/21/2018).  RX for Dilaudid 2mg tablet prescribed by Dr. Ibrahim in clinic today. (#30)

## 2018-12-21 ENCOUNTER — HOSPITAL ENCOUNTER (OUTPATIENT)
Dept: RADIOLOGY | Facility: HOSPITAL | Age: 53
Discharge: HOME | End: 2018-12-21
Attending: EMERGENCY MEDICINE
Payer: MEDICAID

## 2018-12-21 ENCOUNTER — HOSPITAL ENCOUNTER (OUTPATIENT)
Dept: WOUND CARE | Facility: HOSPITAL | Age: 53
Discharge: HOME | End: 2018-12-21
Attending: EMERGENCY MEDICINE
Payer: MEDICAID

## 2018-12-21 VITALS
HEART RATE: 89 BPM | HEIGHT: 70 IN | WEIGHT: 293 LBS | DIASTOLIC BLOOD PRESSURE: 80 MMHG | RESPIRATION RATE: 20 BRPM | SYSTOLIC BLOOD PRESSURE: 153 MMHG | BODY MASS INDEX: 41.95 KG/M2 | TEMPERATURE: 98 F

## 2018-12-21 DIAGNOSIS — L97.821 VENOUS STASIS ULCER OF OTHER PART OF LEFT LOWER LEG LIMITED TO BREAKDOWN OF SKIN WITH VARICOSE VEINS: Primary | ICD-10-CM

## 2018-12-21 DIAGNOSIS — I83.028 VENOUS STASIS ULCER OF OTHER PART OF LEFT LOWER LEG LIMITED TO BREAKDOWN OF SKIN WITH VARICOSE VEINS: Primary | ICD-10-CM

## 2018-12-21 DIAGNOSIS — E66.01 MORBID OBESITY WITH BMI OF 40.0-44.9, ADULT: ICD-10-CM

## 2018-12-21 PROCEDURE — 93971 EXTREMITY STUDY: CPT | Mod: TC

## 2018-12-21 PROCEDURE — 93971 EXTREMITY STUDY: CPT | Mod: 26,,, | Performed by: RADIOLOGY

## 2018-12-21 PROCEDURE — 99213 OFFICE O/P EST LOW 20 MIN: CPT | Mod: 25

## 2018-12-21 RX ORDER — HYDROMORPHONE HYDROCHLORIDE 2 MG/1
2 TABLET ORAL EVERY 4 HOURS PRN
Qty: 30 TABLET | Refills: 0 | Status: SHIPPED | OUTPATIENT
Start: 2018-12-21 | End: 2019-01-04 | Stop reason: SDUPTHER

## 2018-12-21 NOTE — PROGRESS NOTES
Subjective:       Patient ID: Danika Voss is a 53 y.o. female.    Chief Complaint: Non-healing Wound    12/14/2018 Patient presents to clinic with Venous Stasis (left lower leg). States she fell on 11/28/2018. [place].  Presents with left lower leg small scratch areas (states it itches and she keeps scratching it), currently taking Atarax. C/o pain to LLE, (taking dilaudid 2mg tab every 4 hours as needed for pain). Pt requesting SSI disability form to be filled by Dr. Ibrahim in clinic today.  12/21/2018: F/U for venous stasis LLE. States she fell to the floor while ambulating in her house on Tuesday 12/18/2018 and went to [facility] emergency room c/o back pain. Per patient, was discharged from ER on the same day. Pt c/o two small bumps like spots in her LLE, states, they are not mosquitos bites, area warm, red and hard to touch. C/o LLE pain, describes it as throbing and burning sensation. Dr. Ibrahim ordered a STAT ultrasound of left lower extremity to r/o DVT in clinic today. Per patient, has an appointment with pain management next month.    As above.  Review of Systems    Objective:    Wounds as noted w/ Marilu's sign & calf tenderness of LLE.  U/s of LLE: no DVT.  Physical Exam    Assessment:       1. Venous stasis ulcer of other part of left lower leg limited to breakdown of skin with varicose veins    2. Morbid obesity with BMI of 40.0-44.9, adult           Wound 10/29/18 1200 Abrasion(s) lower Leg #1 (Active)   10/29/18 1200    Pre-existing: Yes   Primary Wound Type: Abrasion(s)   Side: Left   Orientation: lower   Location: Leg   Wound/PI Number (optional): #1   Ankle-Brachial Index:    Pulses:    Removal Indication and Assessment:    Wound Outcome:    (Retired) Wound Type:    (Retired) Wound Length (cm):    (Retired) Wound Width (cm):    (Retired) Depth (cm):    Wound Description (Comments):    Removal Indications:    Wound Image   12/21/2018  9:00  AM   Dressing Appearance Open to air 12/21/2018  9:00 AM   Drainage Amount None 12/21/2018  9:00 AM   Appearance Pink;Red 12/21/2018  9:00 AM   Tissue loss description Partial thickness 12/21/2018  9:00 AM   Red (%), Wound Tissue Color 50 % 12/21/2018  9:00 AM   Periwound Area [place];Redness 12/21/2018  9:00 AM   Wound Edges Irregular 12/21/2018  9:00 AM   Wound Length (cm) 5.5 cm 12/21/2018  9:00 AM   Wound Width (cm) 12 cm 12/21/2018  9:00 AM   Wound Depth (cm) 0.1 cm 12/21/2018  9:00 AM   Wound Volume (cm^3) 6.6 cm^3 12/21/2018  9:00 AM   Wound Surface Area (cm^2) 66 cm^2 12/21/2018  9:00 AM   Care Cleansed with:;Sterile normal saline 12/21/2018  9:00 AM   Dressing Applied 12/21/2018  9:00 AM   Compression Tubular elasticized bandage 12/21/2018  9:00 AM   Dressing Change Due 12/24/18 12/21/2018  9:00 AM         Cleanse wound with: Normal saline  Periwound care:Sween cream  Primary dressing: Betamethasone to venous excoriations, cover with Aquacel foam  tubigrip F toes to knee both legs (2 tubing F to left lower leg and 1 to right lower leg)      Plan:                Follow-up in about 1 week (around 12/28/2018).  STAT ultrasound of Left Lower extremity to r/o DVT. Referred pt to ultrasound imaging.

## 2018-12-27 DIAGNOSIS — G89.4 CHRONIC PAIN DISORDER: Chronic | ICD-10-CM

## 2018-12-27 RX ORDER — GABAPENTIN 600 MG/1
TABLET ORAL
Qty: 180 TABLET | Refills: 2 | Status: SHIPPED | OUTPATIENT
Start: 2018-12-27 | End: 2019-01-16 | Stop reason: SDUPTHER

## 2018-12-28 ENCOUNTER — HOSPITAL ENCOUNTER (OUTPATIENT)
Dept: WOUND CARE | Facility: HOSPITAL | Age: 53
Discharge: HOME | End: 2018-12-28
Attending: EMERGENCY MEDICINE
Payer: MEDICAID

## 2018-12-28 VITALS
SYSTOLIC BLOOD PRESSURE: 167 MMHG | BODY MASS INDEX: 41.95 KG/M2 | DIASTOLIC BLOOD PRESSURE: 108 MMHG | HEIGHT: 70 IN | TEMPERATURE: 98 F | WEIGHT: 293 LBS | HEART RATE: 95 BPM

## 2018-12-28 DIAGNOSIS — L97.821 VENOUS STASIS ULCER OF OTHER PART OF LEFT LOWER LEG LIMITED TO BREAKDOWN OF SKIN WITH VARICOSE VEINS: Primary | ICD-10-CM

## 2018-12-28 DIAGNOSIS — I83.028 VENOUS STASIS ULCER OF OTHER PART OF LEFT LOWER LEG LIMITED TO BREAKDOWN OF SKIN WITH VARICOSE VEINS: Primary | ICD-10-CM

## 2018-12-28 PROCEDURE — 99213 OFFICE O/P EST LOW 20 MIN: CPT

## 2018-12-28 NOTE — PROGRESS NOTES
Subjective:       Patient ID: Danika Voss is a 53 y.o. female.    Chief Complaint: Venous Stasis (bilateral lower legs)    12/14/2018 Patient presents to clinic with Venous Stasis (left lower leg). States she fell on 11/28/2018. Hx[place] right [place].  Presents with left lower leg small scratch areas (states it itches and she keeps scratching it), currently taking Atarax. C/o pain to LLE, (taking dilaudid 2mg tab every 4 hours as needed for pain). Pt requesting SSI disability form to be filled by Dr. Ibrahim in clinic today.  12/21/2018: F/U for venous stasis LLE. States she fell to the floor while ambulating in her house on Tuesday 12/18/2018 and went to [facility] emergency room c/o back pain. Per patient, was discharged from ER on the same day. Pt c/o two small bumps like spots in her LLE, states, they are not mosquitos bites, area warm, red and hard to touch. C/o LLE pain, describes it as throbing and burning sensation. Dr. Ibrahim ordered a STAT ultrasound of left lower extremity to r/o DVT in clinic today. Per patient, has an appointment with pain management next month.  12/28/18: F/U Dr. Ibrahim. Has PCP appt. 1/5/19 and pain appt. 1/25/19.    As above.  Review of Systems    Objective:    Wounds as noted w/ Marilu's sign & calf tenderness of LLE.  U/s of LLE: no DVT.  Physical Exam    Assessment:       1. Venous stasis ulcer of other part of left lower leg limited to breakdown of skin with varicose veins           Wound 10/29/18 1200 Abrasion(s) lower Leg #1 (Active)   10/29/18 1200    Pre-existing: Yes   Primary Wound Type: Abrasion(s)   Side: Left   Orientation: lower   Location: Leg   Wound/PI Number (optional): #1   Ankle-Brachial Index:    Pulses:    Removal Indication and Assessment:    Wound Outcome:    (Retired) Wound Type:    (Retired) Wound Length (cm):    (Retired) Wound Width (cm):    (Retired) Depth (cm):    Wound Description (Comments):    Removal  Indications:    Dressing Appearance Open to air 12/28/2018 10:52 AM   Drainage Amount None 12/28/2018 10:52 AM   Appearance Pink;Dry 12/28/2018 10:52 AM   Tissue loss description Partial thickness 12/28/2018 10:52 AM   Red (%), Wound Tissue Color 100 % 12/28/2018 10:52 AM   Periwound Area [place] 12/28/2018 10:52 AM   Wound Edges Approximated 12/28/2018 10:52 AM   Wound Length (cm) 7 cm 12/28/2018 11:13 AM   Wound Width (cm) 5 cm 12/28/2018 11:13 AM   Wound Depth (cm) 0.1 cm 12/28/2018 11:13 AM   Wound Volume (cm^3) 3.5 cm^3 12/28/2018 11:13 AM   Wound Surface Area (cm^2) 35 cm^2 12/28/2018 11:13 AM   Care Cleansed with:;Sterile normal saline 12/28/2018 10:52 AM   Dressing Applied;Tubular bandage 12/28/2018 11:13 AM   Periwound Care Moisturizer applied 12/28/2018 11:13 AM   Compression Tubular elasticized bandage 12/28/2018 11:13 AM   Dressing Change Due 12/29/18 12/28/2018 11:13 AM         Cleanse wound with: Normal saline  Periwound care:Sween cream  Primary dressing: Betamethasone to venous excoriations, cover with Aquacel foam  tubigrip F toes to knee both legs (2 tubing F to left lower leg and 1 to right lower leg)      Plan:       As above; Dilaudid 2mg. Refilled x #30.         Follow-up in about 7 days (around 1/4/2019).

## 2019-01-04 ENCOUNTER — HOSPITAL ENCOUNTER (OUTPATIENT)
Dept: WOUND CARE | Facility: HOSPITAL | Age: 54
Discharge: HOME | End: 2019-01-04
Attending: EMERGENCY MEDICINE
Payer: MEDICAID

## 2019-01-04 VITALS
HEIGHT: 70 IN | RESPIRATION RATE: 20 BRPM | HEART RATE: 89 BPM | TEMPERATURE: 98 F | DIASTOLIC BLOOD PRESSURE: 87 MMHG | SYSTOLIC BLOOD PRESSURE: 178 MMHG | WEIGHT: 293 LBS | BODY MASS INDEX: 41.95 KG/M2

## 2019-01-04 DIAGNOSIS — L97.821 VENOUS STASIS ULCER OF OTHER PART OF LEFT LOWER LEG LIMITED TO BREAKDOWN OF SKIN WITH VARICOSE VEINS: Primary | ICD-10-CM

## 2019-01-04 DIAGNOSIS — I83.028 VENOUS STASIS ULCER OF OTHER PART OF LEFT LOWER LEG LIMITED TO BREAKDOWN OF SKIN WITH VARICOSE VEINS: Primary | ICD-10-CM

## 2019-01-04 PROCEDURE — 99212 OFFICE O/P EST SF 10 MIN: CPT

## 2019-01-04 RX ORDER — HYDROMORPHONE HYDROCHLORIDE 2 MG/1
2 TABLET ORAL EVERY 4 HOURS PRN
Qty: 30 TABLET | Refills: 0 | Status: SHIPPED | OUTPATIENT
Start: 2019-01-04 | End: 2019-01-11 | Stop reason: SDUPTHER

## 2019-01-04 NOTE — PROGRESS NOTES
Subjective:       Patient ID: Danika Voss is a 53 y.o. female.    Chief Complaint: Non-healing Wound    12/14/2018 Patient presents to clinic with Venous Stasis (left lower leg). States she fell on 11/28/2018. Hx[place] right [place].  Presents with left lower leg small scratch areas (states it itches and she keeps scratching it), currently taking Atarax. C/o pain to LLE, (taking dilaudid 2mg tab every 4 hours as needed for pain). Pt requesting SSI disability form to be filled by Dr. Ibrahim in clinic today.  12/21/2018: F/U for venous stasis LLE. States she fell to the floor while ambulating in her house on Tuesday 12/18/2018 and went to [facility] emergency room c/o back pain. Per patient, was discharged from ER on the same day. Pt c/o two small bumps like spots in her LLE, states, they are not mosquitos bites, area warm, red and hard to touch. C/o LLE pain, describes it as throbing and burning sensation. Dr. Ibrahim ordered a STAT ultrasound of left lower extremity to r/o DVT in clinic today. Per patient, has an appointment with pain management next month.  12/28/18: F/U Dr. Ibrahim. Has PCP appt. 1/5/19 and pain appt. 1/25/19.  01/04/19: F/U Dr. Ibrahim, c/o pain to left lower leg, states has an appt. 1/25/19 with pain management. Wound to left lower leg presents with  Excoriation, no depth present as per measurement in clinic today, size in length and width remains the same from last visit.  Wound picture taken today.        Pt. C/o persistent LLE pain which remains relieved w/ current Rx.  Review of Systems    Objective:    Wounds appear completely epithelialized w/o Marilu's sign or s/s of of infection.  Physical Exam    Assessment:       1. Venous stasis ulcer of other part of left lower leg limited to breakdown of skin with varicose veins           Wound 10/29/18 1200 Abrasion(s) lower Leg #1 (Active)   10/29/18 1200    Pre-existing: Yes   Primary Wound Type:  Abrasion(s)   Side: Left   Orientation: lower   Location: Leg   Wound/PI Number (optional): #1   Ankle-Brachial Index:    Pulses:    Removal Indication and Assessment:    Wound Outcome:    (Retired) Wound Type:    (Retired) Wound Length (cm):    (Retired) Wound Width (cm):    (Retired) Depth (cm):    Wound Description (Comments):    Removal Indications:    Wound Image   1/4/2019 11:30 AM   Wound WDL ex 1/4/2019 11:30 AM   Dressing Appearance Dry;Dried drainage 1/4/2019 11:30 AM   Drainage Amount Scant 1/4/2019 11:30 AM   Drainage Characteristics/Odor Serosanguineous 1/4/2019 11:30 AM   Appearance Pink;Red 1/4/2019 11:30 AM   Tissue loss description Partial thickness 1/4/2019 11:30 AM   Red (%), Wound Tissue Color 100 % 1/4/2019 11:30 AM   Periwound Area Hemosiderin Staining;Excoriated 1/4/2019 11:30 AM   Wound Length (cm) 7 cm 1/4/2019 11:30 AM   Wound Width (cm) 5 cm 1/4/2019 11:30 AM   Wound Depth (cm) 0 cm 1/4/2019 11:30 AM   Wound Volume (cm^3) 0 cm^3 1/4/2019 11:30 AM   Wound Surface Area (cm^2) 35 cm^2 1/4/2019 11:30 AM   Care Cleansed with:;Sterile normal saline 1/4/2019 11:30 AM   Dressing Changed;Tubular bandage 1/4/2019 11:30 AM   Periwound Care Moisturizer applied 1/4/2019 11:30 AM   Dressing Change Due 01/07/19 1/4/2019 11:30 AM         Cleanse wound with: Normal saline  Periwound care:Sween cream  Primary dressing: Betamethasone to venous excoriations  tubigrip F toes to knee both legs (2 tubing F to left lower leg and 1 to right lower leg)      Plan:       As above; Dilaudid refilled x #30.         Follow-up in about 1 week (around 1/11/2019).

## 2019-01-11 ENCOUNTER — HOSPITAL ENCOUNTER (OUTPATIENT)
Dept: WOUND CARE | Facility: HOSPITAL | Age: 54
Discharge: HOME | End: 2019-01-11
Attending: EMERGENCY MEDICINE
Payer: MEDICAID

## 2019-01-11 VITALS
SYSTOLIC BLOOD PRESSURE: 127 MMHG | HEIGHT: 70 IN | WEIGHT: 293 LBS | DIASTOLIC BLOOD PRESSURE: 76 MMHG | TEMPERATURE: 99 F | HEART RATE: 92 BPM | BODY MASS INDEX: 41.95 KG/M2 | RESPIRATION RATE: 20 BRPM

## 2019-01-11 DIAGNOSIS — I83.028 VENOUS STASIS ULCER OF OTHER PART OF LEFT LOWER LEG LIMITED TO BREAKDOWN OF SKIN WITH VARICOSE VEINS: Primary | ICD-10-CM

## 2019-01-11 DIAGNOSIS — L97.821 VENOUS STASIS ULCER OF OTHER PART OF LEFT LOWER LEG LIMITED TO BREAKDOWN OF SKIN WITH VARICOSE VEINS: Primary | ICD-10-CM

## 2019-01-11 PROCEDURE — 99212 OFFICE O/P EST SF 10 MIN: CPT

## 2019-01-11 NOTE — PROGRESS NOTES
Subjective:       Patient ID: Danika Voss is a 53 y.o. female.    Chief Complaint: Non-healing Wound    12/14/2018 Patient presents to clinic with Venous Stasis (left lower leg). States she fell on 11/28/2018. [place]  Presents with left lower leg small scratch areas (states it itches and she keeps scratching it), currently taking Atarax. C/o pain to LLE, (taking dilaudid 2mg tab every 4 hours as needed for pain). Pt requesting SSI disability form to be filled by Dr. Ibrahim in clinic today.  12/21/2018: F/U for venous stasis LLE. States she fell to the floor while ambulating in her house on Tuesday 12/18/2018 and went to [facility] emergency room c/o back pain. Per patient, was discharged from ER on the same day. Pt c/o two small bumps like spots in her LLE, states, they are not mosquitos bites, area warm, red and hard to touch. C/o LLE pain, describes it as throbing and burning sensation. Dr. Ibrahim ordered a STAT ultrasound of left lower extremity to r/o DVT in clinic today. Per patient, has an appointment with pain management next month.  12/28/18: F/U Dr. Ibrahim. Has PCP appt. 1/5/19 and pain appt. 1/25/19.  01/04/19: F/U Dr. Ibrahim, c/o pain to left lower leg, states has an appt. 1/25/19 with pain management. Wound to left lower leg presents with  Excoriation, no depth present as per measurement in clinic today, size in length and width remains the same from last visit.  Wound picture taken today.  1/11/2019: Patient presents to clinic c/o pain to left lower leg wound , states had a fall this morning while walking in the parking lot going to court and scratched her wound. Pt states taking last dilaudid pill this morning, and requesting pain medicine. A prescription for Dilaudid 2 mg given to pt by Dr. Ibrahim. Pt states has a pain management appointment on 01/24/2019. Patient refuses tubigrip, states, leg hurts.       Pt. C/o persistent LLE pain  which remains relieved w/ current Rx.  Review of Systems    Objective:    Wounds appear completely epithelialized w/o Marilu's sign or s/s of of infection.  Physical Exam    Assessment:       1. Venous stasis ulcer of other part of left lower leg limited to breakdown of skin with varicose veins           Wound 10/29/18 1200 Abrasion(s) lower Leg #1 (Active)   10/29/18 1200    Pre-existing: Yes   Primary Wound Type: Abrasion(s)   Side: Left   Orientation: lower   Location: Leg   Wound/PI Number (optional): #1   Ankle-Brachial Index:    Pulses:    Removal Indication and Assessment:    Wound Outcome:    (Retired) Wound Type:    (Retired) Wound Length (cm):    (Retired) Wound Width (cm):    (Retired) Depth (cm):    Wound Description (Comments):    Removal Indications:    Wound Image   1/11/2019  3:30 PM   Wound WDL ex 1/11/2019  3:30 PM   Dressing Appearance Dried drainage 1/11/2019  3:30 PM   Drainage Amount Small 1/11/2019  3:30 PM   Drainage Characteristics/Odor Serosanguineous 1/11/2019  3:30 PM   Appearance Pink;Red 1/11/2019  3:30 PM   Tissue loss description Partial thickness 1/11/2019  3:30 PM   Red (%), Wound Tissue Color 80 % 1/11/2019  3:30 PM   Yellow (%), Wound Tissue Color 20 % 1/11/2019  3:30 PM   Periwound Area Hemosiderin Staining;Redness;Satellite lesion;Swelling;Excoriated 1/11/2019  3:30 PM   Wound Length (cm) 11 cm 1/11/2019  3:30 PM   Wound Width (cm) 11 cm 1/11/2019  3:30 PM   Wound Depth (cm) 0 cm 1/11/2019  3:30 PM   Wound Volume (cm^3) 0 cm^3 1/11/2019  3:30 PM   Wound Surface Area (cm^2) 121 cm^2 1/11/2019  3:30 PM   Care Cleansed with:;Sterile normal saline 1/11/2019  3:30 PM   Dressing Applied;Island/border 1/11/2019  3:30 PM   Periwound Care Moisturizer applied 1/11/2019  3:30 PM   Dressing Change Due 01/14/19 1/11/2019  3:30 PM         Cleanse wound with: Normal saline  Periwound care: Sween cream  Primary dressing: Betamethasone to venous excoriations, cover with aquacel border x2  Edema  control: tubigrip G toes to knee both legs (2 tubing G to left lower leg and 1 to right lower leg) (patient refused tubigrip)      Plan:       As above; Dilaudid 2mg. Refilled.  Pain management as per appt.         Follow-up in about 1 week (around 1/18/2019).

## 2019-01-16 DIAGNOSIS — G89.4 CHRONIC PAIN DISORDER: Chronic | ICD-10-CM

## 2019-01-16 RX ORDER — GABAPENTIN 600 MG/1
TABLET ORAL
Qty: 180 TABLET | Refills: 0 | Status: SHIPPED | OUTPATIENT
Start: 2019-01-16 | End: 2019-02-19 | Stop reason: SDUPTHER

## 2019-01-16 NOTE — TELEPHONE ENCOUNTER
----- Message from Tamy Barrientos sent at 1/16/2019  4:02 PM CST -----  Contact: self / [phone]  Patient is requesting a refill on the below. Please advise      gabapentin (NEURONTIN) 600 MG tablet        [facility]    [email or web address]    [id number] [place] [address] · ~[address]    [phone]

## 2019-01-18 ENCOUNTER — TELEPHONE (OUTPATIENT)
Dept: FAMILY MEDICINE | Facility: CLINIC | Age: 54
End: 2019-01-18

## 2019-01-18 ENCOUNTER — HOSPITAL ENCOUNTER (OUTPATIENT)
Dept: WOUND CARE | Facility: HOSPITAL | Age: 54
Discharge: HOME | End: 2019-01-18
Attending: EMERGENCY MEDICINE
Payer: MEDICAID

## 2019-01-18 VITALS
DIASTOLIC BLOOD PRESSURE: 93 MMHG | HEART RATE: 88 BPM | WEIGHT: 293 LBS | HEIGHT: 70 IN | RESPIRATION RATE: 20 BRPM | BODY MASS INDEX: 41.95 KG/M2 | TEMPERATURE: 98 F | SYSTOLIC BLOOD PRESSURE: 192 MMHG

## 2019-01-18 DIAGNOSIS — G89.4 CHRONIC PAIN DISORDER: Chronic | ICD-10-CM

## 2019-01-18 DIAGNOSIS — I83.028 VENOUS STASIS ULCER OF OTHER PART OF LEFT LOWER LEG LIMITED TO BREAKDOWN OF SKIN WITH VARICOSE VEINS: Primary | ICD-10-CM

## 2019-01-18 DIAGNOSIS — L97.821 VENOUS STASIS ULCER OF OTHER PART OF LEFT LOWER LEG LIMITED TO BREAKDOWN OF SKIN WITH VARICOSE VEINS: Primary | ICD-10-CM

## 2019-01-18 PROCEDURE — 99211 OFF/OP EST MAY X REQ PHY/QHP: CPT

## 2019-01-18 RX ORDER — GABAPENTIN 600 MG/1
TABLET ORAL
Qty: 180 TABLET | Refills: 0 | Status: CANCELLED | OUTPATIENT
Start: 2019-01-18

## 2019-01-18 RX ORDER — HYDROMORPHONE HYDROCHLORIDE 2 MG/1
2 TABLET ORAL EVERY 6 HOURS PRN
Qty: 30 TABLET | Refills: 0 | Status: SHIPPED | OUTPATIENT
Start: 2019-01-18 | End: 2019-02-19

## 2019-01-18 NOTE — PROGRESS NOTES
Subjective:       Patient ID: Danika Voss is a 53 y.o. female.    Chief Complaint: Non-healing Wound    12/14/2018 Patient presents to clinic with Venous Stasis (left lower leg). States she fell on 11/28/2018. [place].  Presents with left lower leg small scratch areas (states it itches and she keeps scratching it), currently taking Atarax. C/o pain to LLE, (taking dilaudid 2mg tab every 4 hours as needed for pain). Pt requesting SSI disability form to be filled by Dr. Ibrahim in clinic today.  12/21/2018: F/U for venous stasis LLE. States she fell to the floor while ambulating in her house on Tuesday 12/18/2018 and went to [facility] emergency room c/o back pain. Per patient, was discharged from ER on the same day. Pt c/o two small bumps like spots in her LLE, states, they are not mosquitos bites, area warm, red and hard to touch. C/o LLE pain, describes it as throbing and burning sensation. Dr. Ibrahim ordered a STAT ultrasound of left lower extremity to r/o DVT in clinic today. Per patient, has an appointment with pain management next month.  12/28/18: F/U Dr. Ibrahim. Has PCP appt. 1/5/19 and pain appt. 1/25/19.    01/04/19: F/U Dr. Ibrahim, c/o pain to left lower leg, states has an appt. 1/25/19 with pain management. Wound to left lower leg presents with  Excoriation, no depth present as per measurement in clinic today, size in length and width remains the same from last visit.  Wound picture taken today.    1/11/2019: Patient presents to clinic c/o pain to left lower leg wound , states had a fall this morning while walking in the parking lot going to court and scratched her wound. Pt states taking last dilaudid pill this morning, and requesting pain medicine. A prescription for Dilaudid 2 mg given to pt by Dr. Ibrahim. Pt states has a pain management appointment on 01/24/2019. Patient refuses tubigrip, states, leg hurts.     1/18/2019: F/U with   Patrice in wound care clinic, per Dr. Ibrahim, left lower leg wound healed. No apparent distress noted, states took blood pressure medicine before coming to clinic. C/o pain to left lower leg, states has an appt. next week with pain management on (01/24/2019). A prescription for Dilaudid 2 mg given to pt by Dr. Ibrahim. Mrs. Voss discharged from wound care clinic 1/18/2019, discharged on stable conditions.     as above  Review of Systems    Objective:    Wounds 100% epithelialized w/ some ecchymosis & tenderness remaining @ wound site.  Physical Exam    Assessment:       1. Venous stasis ulcer of other part of left lower leg limited to breakdown of skin with varicose veins           Wound 10/29/18 1200 Abrasion(s) lower Leg #1 (Active)   10/29/18 1200    Pre-existing: Yes   Primary Wound Type: Abrasion(s)   Side: Left   Orientation: lower   Location: Leg   Wound/PI Number (optional): #1   Ankle-Brachial Index:    Pulses:    Removal Indication and Assessment:    Wound Outcome:    (Retired) Wound Type:    (Retired) Wound Length (cm):    (Retired) Wound Width (cm):    (Retired) Depth (cm):    Wound Description (Comments):    Removal Indications:    Wound Image   1/18/2019 10:20 AM   Dressing Appearance Open to air 1/18/2019 10:20 AM   Drainage Amount None 1/18/2019 10:20 AM   Appearance Pink;Red;Eschar 1/18/2019 10:20 AM   Tissue loss description Partial thickness 1/18/2019 10:20 AM   Red (%), Wound Tissue Color 95 % 1/18/2019 10:20 AM   Yellow (%), Wound Tissue Color 5 % 1/18/2019 10:20 AM   Periwound Area Dry;Hemosiderin Staining;Redness;Excoriated 1/18/2019 10:20 AM   Wound Length (cm) 5 cm 1/18/2019 10:20 AM   Wound Width (cm) 4 cm 1/18/2019 10:20 AM   Wound Depth (cm) 0 cm 1/18/2019 10:20 AM   Wound Volume (cm^3) 0 cm^3 1/18/2019 10:20 AM   Wound Surface Area (cm^2) 20 cm^2 1/18/2019 10:20 AM   Care Cleansed with:;Sterile normal saline 1/18/2019 10:20 AM   Dressing Applied 1/18/2019 10:20 AM         Cleanse  wound with: Normal saline  Primary dressing: Betamethasone to venous excoriations as needed for itching  Edema control: tubigrip G toes to knee both legs (2 tubing G to left lower leg and 1 to right lower leg) (patient refused tubigrip)      Plan:                Discharged from wound care clinic, educated on the importance of calling Dr. Ibrahim and wound care clinic in the event a new wound develops/opens, voices understanding.  F/u with pain management clinic next week as per pt, appointment on 01/24/2019.  Dilaudid 2mg. Refilled x #30.

## 2019-01-18 NOTE — TELEPHONE ENCOUNTER
I already sent a refill on this medication on 1/16/2019 and pharmacy confirmed receipt - notify the patient that this has already been done since 1/16/2019 =- check with the pharmacy

## 2019-01-18 NOTE — TELEPHONE ENCOUNTER
----- Message from Tamy Barrientos sent at 1/17/2019  4:44 PM CST -----  Contact: [facility] - [phone]  Patient is requesting a refill on the below. Please advise    gabapentin (NEURONTIN) 600 MG tablet        Pharmacy     [id number] - [place] - [id number] [facility]

## 2019-01-18 NOTE — TELEPHONE ENCOUNTER
----- Message from Polina Davila sent at 1/18/2019 12:59 PM CST -----  Contact: [phone]  Patient called to speak with your office about a mixed up prescription.    Please call and advise.

## 2019-01-22 ENCOUNTER — TELEPHONE (OUTPATIENT)
Dept: FAMILY MEDICINE | Facility: CLINIC | Age: 54
End: 2019-01-22

## 2019-01-22 NOTE — TELEPHONE ENCOUNTER
"Notified pharmacy "Yes this is the correct dosing. She is stable on this medication and we are monitoring labs"    Notified pt she should be able to fill script, call [facility]  "

## 2019-01-22 NOTE — TELEPHONE ENCOUNTER
Dr. Lewis,  This is a patient of yours that I had refilled the Gabapentin prescription as a refill request but I did not change any instructions - so please review the Gabapentin prescription order and clarify how you would like the patient to take the medication please.  As per the message below - pharmacy is calling for a clarification order.    Thanks,  Angie

## 2019-01-22 NOTE — TELEPHONE ENCOUNTER
----- Message from Anna Rutherford sent at 2019 11:29 AM CST -----  Contact: [phone]/self  Pt called to speak with you concerning medication gabapentin (NEURONTIN) 600 MG tablet being unable to . States that she cannot leave to go to her husbands  due to being held up by the medication. Please call and advise.

## 2019-01-22 NOTE — TELEPHONE ENCOUNTER
----- Message from Tamy Barrientos sent at 1/22/2019  9:57 AM CST -----  Contact: self / [phone]  Patient is requesting a refill on the below. Please advise  Pharmacy is giving her problems on the below      gabapentin (NEURONTIN) 600 MG tablet      Pharmacy     [id number] - [place] - [address] [facility]

## 2019-01-22 NOTE — TELEPHONE ENCOUNTER
----- Message from Monse Quinones sent at 1/22/2019  9:08 AM CST -----  Contact: [facility] [phone]  Pharmacy would like to get clarity on orders for prescription gabapentin (NEURONTIN) 600 MG tablet. Dosage is higher than normal. Please call and advise.

## 2019-01-22 NOTE — TELEPHONE ENCOUNTER
Pharmacy wanted to verify the gabapentin (NEURONTIN) 600 MG tablet take 1 tablet 6 times daily. Says this is a high dose for patients age. Please advise.

## 2019-02-15 PROBLEM — R00.2 PALPITATIONS: Status: RESOLVED | Noted: 2018-10-15 | Resolved: 2019-02-15

## 2019-02-15 PROBLEM — K29.00 ACUTE GASTRITIS WITHOUT HEMORRHAGE: Status: RESOLVED | Noted: 2018-11-19 | Resolved: 2019-02-15

## 2019-02-15 NOTE — ASSESSMENT & PLAN NOTE
- referred to Hepatology by me and GI and pt did not keep appts  - encouraged her to see Hepatology  - Hep B not immune  - rec TwinRix 0,1, and 6 months and Rx sent to pharmacy

## 2019-02-15 NOTE — PROGRESS NOTES
FAMILY MEDICINE    Patient Active Problem List   Diagnosis    Essential hypertension    Morbid obesity with BMI of 40.0-44.9, adult    Recurrent major depressive disorder, in partial remission    Hypothyroidism due to acquired atrophy of thyroid    Chronic hepatitis C without hepatic coma    Gastroesophageal reflux disease with esophagitis    Opioid dependence    Chronic pain disorder    Family history of colon cancer    Hypertensive left ventricular hypertrophy, without heart failure    Other insomnia    Venous stasis ulcer with varicose veins    Dermatitis    Elevated LFTs    Generalized anxiety disorder       CC:   Chief Complaint   Patient presents with    Follow-up       SUBJECTIVE:  Danika Voss   is a 53 y.o. female  - with HTN, depression, hypothyroidism, h/o SVT, chronic pain (previously followed by Pain Management) and venous statis with ulcer for left lower leg (followed wound care) presents for follow-up HTN and anxiety/depression    1. Hypertension    Current medication treatment: Coreg 3.125 mg BID, Diltiazem  mg daily, Valsartan-HCTZ 160/12.5 mg daily  Medication side effects: deneis  Exercise regimen: none  Dietary treatment: not consistent    Home BP cuff: none    2. Depression/Anxiety     Prior treatments: Effexor ER 75 mg daily, Effexor  mg daily, Xanax 0.25 and 0.5 mg PRN  Side effects of prior treatment: not effective, interactions with her chronic opioids    Current treatment: Cymbalta 60 mg daily  Side effects of current treatment: denies    Symptoms related: none at this time and hopeful now that has re-established with Pain Management at [facility]. Reports plan for [facility]  Panic attacks: denies    Hopelessness: denies  Sleep: yes and on Trazodone 50 mg daily that has help. Pain Management also added Elavil 25 mg at bedtime  Suicidal thoughts: denies  Thoughts of self harm:denies  Thoughts of harm to others: denies  History of suicide attempts:  denies  Family history of suicide:denies    Support system: son  Counselor: none        ROS: Review of Systems   Constitutional: Negative for activity change, appetite change, fatigue, fever and unexpected weight change.   Eyes: Negative for visual disturbance.   Respiratory: Negative for cough, chest tightness and shortness of breath.    Cardiovascular: Negative for chest pain, palpitations and leg swelling.   Gastrointestinal: Negative for abdominal pain, blood in stool, constipation, diarrhea, nausea and vomiting.   Endocrine: Negative for cold intolerance, heat intolerance, polydipsia, polyphagia and polyuria.   Genitourinary: Negative for difficulty urinating, dysuria, frequency and hematuria.   Musculoskeletal: Positive for arthralgias, back pain, gait problem and myalgias. Negative for joint swelling, neck pain and neck stiffness.   Skin: Positive for rash and wound.   Neurological: Negative for dizziness, weakness, light-headedness, numbness and headaches.   Psychiatric/Behavioral: Positive for sleep disturbance. Negative for dysphoric mood. The patient is nervous/anxious.        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    MRSA, Peptostreptococcus    NSAID induced gastritis 9/8/2015    Obesity     Parkinson disease 11/17/2015       Past Surgical History:   Procedure Laterality Date    ABCESS DRAINAGE Left 8/6/2015    left knee washout    CARDIAC SURGERY  4/7/2010    artery on wrong side, performed in [place]    CHOLECYSTECTOMY  3/2010    COLONOSCOPY N/A 7/27/2018    [facility]    ESOPHAGOGASTRODUODENOSCOPY (EGD) N/A 7/17/2018    [facility]    HYSTERECTOMY      No cervix     INCISION AND DRAINAGE LEG Left 2/2/2016    [facility]ed [facility]uchs Jr[facility]    INCISION AND DRAINAGE-KNEE Left 8/6/2015    [facility] Jr[facility] at [facility]    PERIPHERALLY INSERTED CENTRAL CATHETER INSERTION Right 8/6/2015    PLACEMENT-WOUND VAC Left 2/2/2016    Per[facility] Jr[facility] at [facility]    REVISION-ARTHROPLASTY-KNEE-TOTAL Left 7/21/2015    Performed by Brittany Walsh [facility] at [facility]    shoulder cyst removal Left 2014    [facility]    TONSILLECTOMY      TOTAL KNEE ARTHROPLASTY Bilateral right 6/2014, left 9/30/2014    TOTAL KNEE ARTHROPLASTY Left 7/21/2015    revision       ALLERGIES:   Review of patient's allergies indicates:   Allergen Reactions    Fentanyl Other (See Comments)     Fentanyl patches burn skin    Lisinopril Other (See Comments)     cough    Nsaids (non-steroidal anti-inflammatory drug) Other (See Comments)     gastritis    Toradol [ketorolac] Nausea And Vomiting and Rash       MEDS:   Current Outpatient Medications:     albuterol 90 mcg/actuation inhaler, Inhale 2 puffs into the lungs every 6 (six) hours as needed for Wheezing. Rescue, Disp: 18 g, Rfl: 1    amitriptyline (ELAVIL) 25 MG tablet, Take 25 mg by mouth once daily., Disp: , Rfl: 1    carvedilol (COREG) 3.125 MG tablet, Take 1 tablet (3.125 mg total) by mouth 2 (two) times daily., Disp: 180 tablet, Rfl: 1    diltiaZEM (CARDIZEM CD) 240 MG 24 hr capsule, Take 1 capsule (240 mg total) by mouth once daily., Disp: 90 capsule, Rfl: 1    DULoxetine (CYMBALTA) 60 MG capsule, Take 1 capsule (60 mg total) by mouth once daily., Disp: 90 capsule, Rfl: 1    flecainide (TAMBOCOR) 50 MG Tab, Take 100 mg by mouth 2 (two) times daily., Disp: , Rfl:     gabapentin (NEURONTIN) 600 MG tablet, TAKE 1 TABLET BY MOUTH 6 TIMES A DAY, Disp: 180 tablet, Rfl: 0    hydrOXYzine (ATARAX) 50 MG tablet, Take 1 tablet (50 mg total) by mouth 4 (four) times  "daily as needed for Itching., Disp: 30 tablet, Rfl: 1    levothyroxine (SYNTHROID) 100 MCG tablet, Take 1 tablet (100 mcg total) by mouth once daily., Disp: 90 tablet, Rfl: 1    oxyCODONE (ROXICODONE) 5 MG immediate release tablet, Take 5 mg by mouth 3 (three) times daily., Disp: , Rfl: 0    traZODone (DESYREL) 50 MG tablet, TAKE 1 TABLET BY MOUTH EVERY NIGHT AS NEEDED FOR INSOMNIA, Disp: , Rfl: 3    valsartan-hydrochlorothiazide (DIOVAN-HCT) 160-12.5 mg per tablet, Take 1 tablet by mouth once daily., Disp: 90 tablet, Rfl: 1    betamethasone valerate 0.1% (VALISONE) 0.1 % Crea, Apply topically 2 (two) times daily. for 10 days, Disp: 45 g, Rfl: 0    hepatitis A and B vaccine, PF, (TWINRIX) 720 KIRSTIE unit- 20 mcg/mL Syrg suspension, Inject 1mL IM at 0,1 and 6 months, Disp: 1 mL, Rfl: 2    Current Facility-Administered Medications:     cadexomer iodine 0.9 % gel, , Topical (Top), Once, Patricio Ibrahim MD    OBJECTIVE:   Vitals:    02/19/19 0839   BP: (!) 138/90   BP Location: Left arm   Patient Position: Sitting   BP Method: Large (Manual)   Pulse: 87   Resp: 18   Temp: 97.5 °F (36.4 °C)   TempSrc: Oral   SpO2: 98%   Weight: (!) 146 kg (321 lb 14.4 oz)   Height: 5' 10" (1.778 m)     Body mass index is 46.19 kg/m².    Physical Exam   Constitutional: No distress.   Neck: Neck supple.   Cardiovascular: Normal rate, regular rhythm, normal heart sounds and intact distal pulses. Exam reveals no gallop and no friction rub.   No murmur heard.  Pulmonary/Chest: Effort normal and breath sounds normal.   Abdominal: Soft. Normal appearance and bowel sounds are normal. There is no tenderness.   Musculoskeletal: She exhibits no edema.   Neurological: She is alert.   Psychiatric:   Mood "better" "hopeful"  Affect smiles and full range         Depression Patient Health Questionnaire 2/19/2019 10/29/2018 9/18/2018 2/26/2018   In the last two weeks how often have you had little interest or pleasure in doing things 1 0 3 0 " "  In the last two weeks how often have you felt down, depressed or hopeless 0 0 3 0   PHQ-2 Total Score 1 0 6 0   In the last two weeks how often have you had trouble falling or staying asleep, or sleeping too much - - 3 -   In the last two weeks how often have you felt tired or having little energy - - 3 -   In the last two weeks how often have you had a poor appetite or overeating - - 3 -   In the last two weeks how often have you felt bad about yourself - or that you are a failure or have let yourself or your family down - - 3 -   In the last two weeks how often have you had trouble concentrating on things, such as reading the newspaper or watching television - - 3 -   In the last two weeks how often have you been moving or speaking so slowly that other people could have noticed. Or the opposite - being so fidgety or restless that you have been moving around a lot more than usual. - - 0 -   In the last two weeks how often have you had thoughts that you would be better off dead, or of hurting yourself - - 0 -   If you checked off any problems, how difficult have these problems made it for you to do your work, take care of things at home or get along with other people? - - Somewhat difficult -   Total Score - - 21 -         MMSE 2/19/2019   What is the (year), (season), (date), (day), (month)? 5   Where are we (state), (country), (town or city), (hospital), (floor)? 5   Name 3 common objects (eg. "apple", "table", "anthony"). Take 1 second to say each. Then ask the patient to repeat all 3. Give 1 point for each correct answer. Then repeat them until he/she learns all 3. Count trials and record. 3   Serial 7's backwards. Stop after 5 answers. (100,93,86,79,72) or alternatively  spell "WORLD" backwards. (D..L..R..O..W). The score is the number of letters in correct order. 5   Ask for the 3 common objects named earlier in the exam. Give 1 point for each correct answer. 3   Name a "pencil" and "watch." 2   Repeat the " "following: "No ifs, ands, or buts." 1   Follow a 3-stage command: "Take a paper in your right hand, fold it in half, & put it on the floor." 3   Read and obey the following: (see paper exam) 1   Write a sentence. 1   Copy the following design: (see paper exam) 0   Total MMSE Score 29   Some recent data might be hidden     PERTINENT RESULTS:   BMP  Lab Results   Component Value Date     10/15/2018    K 3.9 10/15/2018     10/15/2018    CO2 26 10/15/2018    BUN 8 10/15/2018    CREATININE 0.52 10/15/2018    CALCIUM 9.5 10/15/2018    ANIONGAP 8 10/15/2018    ESTGFRAFRICA >60.0 10/15/2018    EGFRNONAA >60.0 10/15/2018     Lab Results   Component Value Date    TSH 1.020 10/15/2018    FREET4 1.22 10/15/2018       ASSESSMENT:  Problem List Items Addressed This Visit        Neuro    Chronic pain disorder (Chronic)    Overview     - followed by Dr. [facility]         Current Assessment & Plan     - on chronic opioids         Relevant Medications    gabapentin (NEURONTIN) 600 MG tablet       Psychiatric    Recurrent major depressive disorder, in partial remission    Current Assessment & Plan     - well controlled  - continue current meds         Relevant Medications    DULoxetine (CYMBALTA) 60 MG capsule    Generalized anxiety disorder    Current Assessment & Plan     - well controlled  - continue current meds            Cardiac/Vascular    Essential hypertension - Primary (Chronic)    Current Assessment & Plan     - improved control  - continue current meds         Relevant Medications    carvedilol (COREG) 3.125 MG tablet    diltiaZEM (CARDIZEM CD) 240 MG 24 hr capsule    valsartan-hydrochlorothiazide (DIOVAN-HCT) 160-12.5 mg per tablet       Endocrine    Hypothyroidism due to acquired atrophy of thyroid (Chronic)    Current Assessment & Plan     - well controlled  - continue current meds  - TSH at goal         Relevant Medications    levothyroxine (SYNTHROID) 100 MCG tablet       GI    " Chronic hepatitis C without hepatic coma (Chronic)    Overview     - 7/2018 GI eval: Type 3 with minimal fibrosis but +active inflammation          Current Assessment & Plan     - referred to Hepatology by me and GI and pt did not keep appts  - encouraged her to see Hepatology  - Hep B not immune  - rec TwinRix 0,1, and 6 months and Rx sent to pharmacy         Relevant Medications    hepatitis A and B vaccine, PF, (TWINRIX) 720 KIRSTIE unit- 20 mcg/mL Syrg suspension      Other Visit Diagnoses     Screening for breast cancer        Relevant Orders    Mammo Digital Screening Bilat          PLAN:   Orders Placed This Encounter    Mammo Digital Screening Bilat    Pneumococcal Polysaccharide Vaccine (23 Valent) (SQ/IM)    gabapentin (NEURONTIN) 600 MG tablet    DULoxetine (CYMBALTA) 60 MG capsule    levothyroxine (SYNTHROID) 100 MCG tablet    carvedilol (COREG) 3.125 MG tablet    diltiaZEM (CARDIZEM CD) 240 MG 24 hr capsule    valsartan-hydrochlorothiazide (DIOVAN-HCT) 160-12.5 mg per tablet    hepatitis A and B vaccine, PF, (TWINRIX) 720 KIRSTIE unit- 20 mcg/mL Syrg suspension     Orders Placed This Encounter   Procedures    Mammo Digital Screening Bilat     Standing Status:   Future     Standing Expiration Date:   4/15/2020     Order Specific Question:   May the Radiologist modify the order per protocol to meet the clinical needs of the patient?     Answer:   Yes    Pneumococcal Polysaccharide Vaccine (23 Valent) (SQ/IM)       Follow-up in 1-3 months.     Dr. Rosario Lewis D.O.   Family Medicine

## 2019-02-19 ENCOUNTER — OFFICE VISIT (OUTPATIENT)
Dept: FAMILY MEDICINE | Facility: CLINIC | Age: 54
End: 2019-02-19
Payer: MEDICAID

## 2019-02-19 VITALS
DIASTOLIC BLOOD PRESSURE: 90 MMHG | RESPIRATION RATE: 18 BRPM | HEART RATE: 87 BPM | TEMPERATURE: 98 F | BODY MASS INDEX: 41.95 KG/M2 | OXYGEN SATURATION: 98 % | SYSTOLIC BLOOD PRESSURE: 138 MMHG | HEIGHT: 70 IN | WEIGHT: 293 LBS

## 2019-02-19 DIAGNOSIS — E03.4 HYPOTHYROIDISM DUE TO ACQUIRED ATROPHY OF THYROID: Chronic | ICD-10-CM

## 2019-02-19 DIAGNOSIS — G89.4 CHRONIC PAIN DISORDER: Chronic | ICD-10-CM

## 2019-02-19 DIAGNOSIS — F41.1 GENERALIZED ANXIETY DISORDER: ICD-10-CM

## 2019-02-19 DIAGNOSIS — I10 ESSENTIAL HYPERTENSION: Primary | Chronic | ICD-10-CM

## 2019-02-19 DIAGNOSIS — B18.2 CHRONIC HEPATITIS C WITHOUT HEPATIC COMA: Chronic | ICD-10-CM

## 2019-02-19 DIAGNOSIS — Z12.39 SCREENING FOR BREAST CANCER: ICD-10-CM

## 2019-02-19 DIAGNOSIS — F33.41 RECURRENT MAJOR DEPRESSIVE DISORDER, IN PARTIAL REMISSION: ICD-10-CM

## 2019-02-19 PROCEDURE — 99214 OFFICE O/P EST MOD 30 MIN: CPT | Mod: S$PBB,,, | Performed by: FAMILY MEDICINE

## 2019-02-19 PROCEDURE — 99999 PR PBB SHADOW E&M-EST. PATIENT-LVL IV: ICD-10-PCS | Mod: PBBFAC,,, | Performed by: FAMILY MEDICINE

## 2019-02-19 PROCEDURE — 99214 OFFICE O/P EST MOD 30 MIN: CPT | Mod: PBBFAC,PN,25 | Performed by: FAMILY MEDICINE

## 2019-02-19 PROCEDURE — 99214 PR OFFICE/OUTPT VISIT, EST, LEVL IV, 30-39 MIN: ICD-10-PCS | Mod: S$PBB,,, | Performed by: FAMILY MEDICINE

## 2019-02-19 PROCEDURE — 90471 IMMUNIZATION ADMIN: CPT | Mod: PBBFAC,PN

## 2019-02-19 PROCEDURE — 99999 PR PBB SHADOW E&M-EST. PATIENT-LVL IV: CPT | Mod: PBBFAC,,, | Performed by: FAMILY MEDICINE

## 2019-02-19 RX ORDER — GABAPENTIN 600 MG/1
TABLET ORAL
Qty: 180 TABLET | Refills: 0 | Status: SHIPPED | OUTPATIENT
Start: 2019-02-19 | End: 2019-03-19 | Stop reason: SDUPTHER

## 2019-02-19 RX ORDER — VALSARTAN AND HYDROCHLOROTHIAZIDE 160; 12.5 MG/1; MG/1
1 TABLET, FILM COATED ORAL DAILY
Qty: 90 TABLET | Refills: 1 | Status: SHIPPED | OUTPATIENT
Start: 2019-02-19 | End: 2020-03-23 | Stop reason: SDUPTHER

## 2019-02-19 RX ORDER — CARVEDILOL 3.12 MG/1
3.12 TABLET ORAL 2 TIMES DAILY
Qty: 180 TABLET | Refills: 1 | Status: SHIPPED | OUTPATIENT
Start: 2019-02-19 | End: 2020-03-18

## 2019-02-19 RX ORDER — DILTIAZEM HYDROCHLORIDE 240 MG/1
240 CAPSULE, COATED, EXTENDED RELEASE ORAL DAILY
Qty: 90 CAPSULE | Refills: 1 | Status: SHIPPED | OUTPATIENT
Start: 2019-02-19 | End: 2020-03-23 | Stop reason: SDUPTHER

## 2019-02-19 RX ORDER — GABAPENTIN 600 MG/1
TABLET ORAL
Qty: 180 TABLET | Refills: 0 | Status: CANCELLED | OUTPATIENT
Start: 2019-02-19

## 2019-02-19 RX ORDER — TRAZODONE HYDROCHLORIDE 50 MG/1
TABLET ORAL
Refills: 3 | COMMUNITY
Start: 2019-02-07 | End: 2019-03-31 | Stop reason: SDUPTHER

## 2019-02-19 RX ORDER — AMITRIPTYLINE HYDROCHLORIDE 25 MG/1
25 TABLET, FILM COATED ORAL DAILY
Refills: 1 | COMMUNITY
Start: 2019-02-07 | End: 2020-08-19 | Stop reason: ALTCHOICE

## 2019-02-19 RX ORDER — LEVOTHYROXINE SODIUM 100 UG/1
100 TABLET ORAL DAILY
Qty: 90 TABLET | Refills: 1 | Status: SHIPPED | OUTPATIENT
Start: 2019-02-19 | End: 2019-10-11 | Stop reason: SDUPTHER

## 2019-02-19 RX ORDER — DULOXETIN HYDROCHLORIDE 60 MG/1
60 CAPSULE, DELAYED RELEASE ORAL DAILY
Qty: 90 CAPSULE | Refills: 1 | Status: SHIPPED | OUTPATIENT
Start: 2019-02-19 | End: 2019-03-07 | Stop reason: SDUPTHER

## 2019-02-19 RX ORDER — OXYCODONE HYDROCHLORIDE 5 MG/1
5 TABLET ORAL 3 TIMES DAILY
Refills: 0 | COMMUNITY
Start: 2019-01-25 | End: 2019-03-07 | Stop reason: SDUPTHER

## 2019-03-07 ENCOUNTER — OFFICE VISIT (OUTPATIENT)
Dept: FAMILY MEDICINE | Facility: CLINIC | Age: 54
End: 2019-03-07
Payer: MEDICAID

## 2019-03-07 VITALS
BODY MASS INDEX: 41.95 KG/M2 | SYSTOLIC BLOOD PRESSURE: 130 MMHG | OXYGEN SATURATION: 99 % | WEIGHT: 293 LBS | RESPIRATION RATE: 16 BRPM | TEMPERATURE: 98 F | DIASTOLIC BLOOD PRESSURE: 80 MMHG | HEIGHT: 70 IN | HEART RATE: 97 BPM

## 2019-03-07 DIAGNOSIS — M25.561 CHRONIC PAIN OF RIGHT KNEE: ICD-10-CM

## 2019-03-07 DIAGNOSIS — L40.9 PSORIASIS: ICD-10-CM

## 2019-03-07 DIAGNOSIS — F33.41 RECURRENT MAJOR DEPRESSIVE DISORDER, IN PARTIAL REMISSION: ICD-10-CM

## 2019-03-07 DIAGNOSIS — J00 RHINOPHARYNGITIS: Primary | ICD-10-CM

## 2019-03-07 DIAGNOSIS — K21.00 GASTROESOPHAGEAL REFLUX DISEASE WITH ESOPHAGITIS: Chronic | ICD-10-CM

## 2019-03-07 DIAGNOSIS — G89.29 CHRONIC PAIN OF RIGHT KNEE: ICD-10-CM

## 2019-03-07 PROCEDURE — 99214 OFFICE O/P EST MOD 30 MIN: CPT | Mod: S$PBB,,, | Performed by: FAMILY MEDICINE

## 2019-03-07 PROCEDURE — 99215 OFFICE O/P EST HI 40 MIN: CPT | Mod: PBBFAC,PN | Performed by: FAMILY MEDICINE

## 2019-03-07 PROCEDURE — 99999 PR PBB SHADOW E&M-EST. PATIENT-LVL V: ICD-10-PCS | Mod: PBBFAC,,, | Performed by: FAMILY MEDICINE

## 2019-03-07 PROCEDURE — 99999 PR PBB SHADOW E&M-EST. PATIENT-LVL V: CPT | Mod: PBBFAC,,, | Performed by: FAMILY MEDICINE

## 2019-03-07 PROCEDURE — 99214 PR OFFICE/OUTPT VISIT, EST, LEVL IV, 30-39 MIN: ICD-10-PCS | Mod: S$PBB,,, | Performed by: FAMILY MEDICINE

## 2019-03-07 RX ORDER — BETAMETHASONE VALERATE 1.2 MG/G
CREAM TOPICAL 2 TIMES DAILY
Qty: 45 G | Refills: 5 | Status: SHIPPED | OUTPATIENT
Start: 2019-03-07 | End: 2019-10-11 | Stop reason: SDUPTHER

## 2019-03-07 RX ORDER — PANTOPRAZOLE SODIUM 20 MG/1
40 TABLET, DELAYED RELEASE ORAL
COMMUNITY
End: 2019-06-27 | Stop reason: SDUPTHER

## 2019-03-07 RX ORDER — DULOXETIN HYDROCHLORIDE 60 MG/1
60 CAPSULE, DELAYED RELEASE ORAL DAILY
Qty: 90 CAPSULE | Refills: 1 | Status: SHIPPED | OUTPATIENT
Start: 2019-03-07 | End: 2019-10-11 | Stop reason: SDUPTHER

## 2019-03-07 RX ORDER — BETAMETHASONE SODIUM PHOSPHATE AND BETAMETHASONE ACETATE 3; 3 MG/ML; MG/ML
6 INJECTION, SUSPENSION INTRA-ARTICULAR; INTRALESIONAL; INTRAMUSCULAR; SOFT TISSUE
Status: COMPLETED | OUTPATIENT
Start: 2019-03-07 | End: 2019-03-07

## 2019-03-07 RX ADMIN — BETAMETHASONE ACETATE AND BETAMETHASONE SODIUM PHOSPHATE 6 MG: 3; 3 INJECTION, SUSPENSION INTRA-ARTICULAR; INTRALESIONAL; INTRAMUSCULAR; SOFT TISSUE at 11:03

## 2019-03-07 NOTE — ASSESSMENT & PLAN NOTE
- since fall earlier this year with history of prosthesis  - check Xray  - pt would like to see Ortho and referred to Ortho

## 2019-03-07 NOTE — PATIENT INSTRUCTIONS
1. Rest  2. Lots of fluids: water and soups. Avoid sugar drinks and juices  3. Vitamin C 1000 mg daily and Zinc 50 mg daily  4. Over the counter medication like   - cough syrup Daytime and Nighttime. If you have heart disease or hypertension, look for medications that have HBP and a heart that shows that they are safe for you. If you have diabetes, looks for sugar-free  - Vicks vapor on your chest  5. Notify me if fever >3 days, fever that resolves and then returns, shortness of breath, chest pain, severe headache or any other concerns

## 2019-03-07 NOTE — PROGRESS NOTES
FAMILY MEDICINE    Patient Active Problem List   Diagnosis    Essential hypertension    Morbid obesity with BMI of 40.0-44.9, adult    Recurrent major depressive disorder, in partial remission    Hypothyroidism due to acquired atrophy of thyroid    Chronic hepatitis C without hepatic coma    Gastroesophageal reflux disease with esophagitis    Opioid dependence    Chronic pain disorder    Family history of colon cancer    Hypertensive left ventricular hypertrophy, without heart failure    Other insomnia    Venous stasis ulcer with varicose veins    Dermatitis    Elevated LFTs    Generalized anxiety disorder    Psoriasis    Chronic pain of right knee       CC:   Chief Complaint   Patient presents with    Cough     x 3 days    Sore Throat    Nasal Congestion       SUBJECTIVE:  Danika Voss   is a 53 y.o. female  - with HTN, depression, hypothyroidism, h/o SVT, chronic pain (previously followed by Pain Management) and venous statis with ulcer for left lower leg (followed wound care) presents for concerns with sore throat.     Also needs refill of Prilosec, Cymbalta and betamethasone cream that were previously refilled but reports that her pharmacy never got refill request.     1. Sore throat and cough    Duration: started 3/4/19 AM (>48 hours since onset)  Initial symptom: sore throat and fatigue  Progression: congestion, cough, fatigue and persistent sore thorat    Congestion: yes with clear discharge  Cough: hacking and not productive  Fever: denies  Headache: denies  Weakness: denies  Muscle aches: yes  Appetite and fluid intake: decreased    Current treatment regimen: denies  Effects of current treatment: NA    Sick contacts: reports that her grandchildren all have cold's. No contact with flu.     2. Right knee pain and would like a referral to Ortho  Onset: about 3 months ago s/p fall while walking, tripped and landed on her right knee with history of bilateral chronic knee pain and bilateral  "knee replacements. Reports that initially it did swelling with mild bruising and that has resolved. She did not present to clinic with issue  Location: right anterior knee  Duration: constant  Character: achy and sore  Aggravating factors: walking and twisting  Relieving factors: rest and elevation  Timing of day: worst in AM and stiff  Associated symptoms: "it locks up"          ROS: Review of Systems   Constitutional: Positive for activity change, appetite change and fatigue. Negative for fever.   HENT: Positive for congestion, postnasal drip, rhinorrhea and sinus pressure. Negative for ear discharge, ear pain, nosebleeds, sinus pain, trouble swallowing and voice change.    Eyes: Negative for pain, discharge, redness and itching.   Respiratory: Positive for cough. Negative for chest tightness and shortness of breath.    Cardiovascular: Negative for chest pain and palpitations.   Gastrointestinal: Positive for diarrhea. Negative for abdominal pain, blood in stool, constipation, nausea and vomiting.   Endocrine: Negative for cold intolerance, heat intolerance, polydipsia, polyphagia and polyuria.   Genitourinary: Negative for decreased urine volume, difficulty urinating, frequency, pelvic pain and urgency.   Musculoskeletal: Positive for arthralgias and back pain. Negative for myalgias.   Skin: Positive for rash.   Neurological: Negative for dizziness, light-headedness and headaches.   Psychiatric/Behavioral: Positive for sleep disturbance.       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    MRSA, Peptostreptococcus    NSAID induced gastritis 9/8/2015    Obesity     Parkinson disease 11/17/2015       Past Surgical History:   Procedure Laterality Date    ABCESS " DRAINAGE Left 8/6/2015    left knee washout    CARDIAC SURGERY  4/7/2010    artery on wrong side, performed in [place]    CHOLECYSTECTOMY  3/2010    COLONOSCOPY N/A 7/27/2018    [facility]    ESOPHAGOGASTRODUODENOSCOPY (EGD) N/A 7/17/2018    [facility]    HYSTERECTOMY      No cervix    INCISION AND DRAINAGE LEG Left 2/2/2016    [facility]    INCISION AND DRAINAGE-KNEE Left 8/6/2015    [facility]    PERIPHERALLY INSERTED CENTRAL CATHETER INSERTION Right 8/6/2015    PLACEMENT-WOUND VAC Left 2/2/2016    [facility]    REVISION-ARTHROPLASTY-KNEE-TOTAL Left 7/21/2015    [facility] Brittany Walsh [facility] at [facility]    shoulder cyst removal Left 2014    [facility] at [facility]    TONSILLECTOMY      TOTAL KNEE ARTHROPLASTY Bilateral right 6/2014, left 9/30/2014    TOTAL KNEE ARTHROPLASTY Left 7/21/2015    revision       ALLERGIES:   Review of patient's allergies indicates:   Allergen Reactions    Fentanyl Other (See Comments)     Fentanyl patches burn skin    Lisinopril Other (See Comments)     cough    Nsaids (non-steroidal anti-inflammatory drug) Other (See Comments)     gastritis    Toradol [ketorolac] Nausea And Vomiting and Rash       MEDS:   Current Outpatient Medications:     albuterol 90 mcg/actuation inhaler, Inhale 2 puffs into the lungs every 6 (six) hours as needed for Wheezing. Rescue, Disp: 18 g, Rfl: 1    amitriptyline (ELAVIL) 25 MG tablet, Take 25 mg by mouth once daily., Disp: , Rfl: 1    betamethasone valerate 0.1% (VALISONE) 0.1 % Crea, Apply topically 2 (two) times daily. for 10 days, Disp: 45 g, Rfl: 5    carvedilol (COREG) 3.125 MG tablet, Take 1 tablet (3.125 mg total) by mouth 2 (two) times daily., Disp: 180 tablet, Rfl: 1    diltiaZEM (CARDIZEM CD) 240 MG 24 hr  "capsule, Take 1 capsule (240 mg total) by mouth once daily., Disp: 90 capsule, Rfl: 1    DULoxetine (CYMBALTA) 60 MG capsule, Take 1 capsule (60 mg total) by mouth once daily., Disp: 90 capsule, Rfl: 1    flecainide (TAMBOCOR) 50 MG Tab, Take 100 mg by mouth 2 (two) times daily., Disp: , Rfl:     gabapentin (NEURONTIN) 600 MG tablet, TAKE 1 TABLET BY MOUTH 6 TIMES A DAY, Disp: 180 tablet, Rfl: 0    hydrOXYzine (ATARAX) 50 MG tablet, Take 1 tablet (50 mg total) by mouth 4 (four) times daily as needed for Itching., Disp: 30 tablet, Rfl: 1    levothyroxine (SYNTHROID) 100 MCG tablet, Take 1 tablet (100 mcg total) by mouth once daily., Disp: 90 tablet, Rfl: 1    oxyCODONE (ROXICODONE) 5 MG immediate release tablet, Take 1 tablet (5 mg total) by mouth 3 (three) times daily., Disp: 90 tablet, Rfl: 0    pantoprazole (PROTONIX) 20 MG tablet, Take 40 mg by mouth as needed., Disp: , Rfl:     traZODone (DESYREL) 50 MG tablet, TAKE 1 TABLET BY MOUTH EVERY NIGHT AS NEEDED FOR INSOMNIA, Disp: , Rfl: 3    valsartan-hydrochlorothiazide (DIOVAN-HCT) 160-12.5 mg per tablet, Take 1 tablet by mouth once daily., Disp: 90 tablet, Rfl: 1    Current Facility-Administered Medications:     cadexomer iodine 0.9 % gel, , Topical (Top), Once, Patricio Ibrahim MD    OBJECTIVE:   Vitals:    03/07/19 1125   BP: 130/80   BP Location: Left arm   Patient Position: Sitting   BP Method: X-Large (Manual)   Pulse: 97   Resp: 16   Temp: 97.5 °F (36.4 °C)   TempSrc: Oral   SpO2: 99%   Weight: (!) 147.1 kg (324 lb 4.8 oz)   Height: 5' 10" (1.778 m)     Body mass index is 46.53 kg/m².    Physical Exam   Constitutional: No distress.   HENT:   Right Ear: Ear canal normal. A middle ear effusion is present.   Left Ear: Tympanic membrane and ear canal normal.   Nose: Mucosal edema and rhinorrhea present. Right sinus exhibits no maxillary sinus tenderness and no frontal sinus tenderness. Left sinus exhibits no maxillary sinus tenderness and no frontal " sinus tenderness.   Mouth/Throat: Uvula is midline and mucous membranes are normal. Posterior oropharyngeal erythema present. No oropharyngeal exudate.   Eyes: Conjunctivae are normal.   Neck: Neck supple.   Cardiovascular: Normal rate, regular rhythm, normal heart sounds and intact distal pulses. Exam reveals no gallop and no friction rub.   No murmur heard.  Pulmonary/Chest: Effort normal and breath sounds normal. She has no decreased breath sounds. She has no wheezes. She has no rhonchi. She has no rales.   Musculoskeletal: She exhibits edema (trace bilateral).        Right knee: She exhibits decreased range of motion. She exhibits no swelling and no erythema. Tenderness found.   Lymphadenopathy:     She has no cervical adenopathy.   Neurological: She is alert.   Skin: Skin is warm. Rash (plaque rash on elbows and wrist) noted.         ASSESSMENT:  Problem List Items Addressed This Visit        Psychiatric    Recurrent major depressive disorder, in partial remission    Relevant Medications    DULoxetine (CYMBALTA) 60 MG capsule       Derm    Psoriasis    Relevant Medications    betamethasone valerate 0.1% (VALISONE) 0.1 % Crea       GI    Gastroesophageal reflux disease with esophagitis (Chronic)    Overview     - 7/17/2018 EGD: Esophageal mucosal changes suspicious for short-segment Schafer's esophagus. Biopsied. Erythematous mucosa in the antrum. Biopsied. Normal examined duodenum.   - 7/2019 Pathology negative for Schafer's and negative for Hpylori            Relevant Medications    pantoprazole (PROTONIX) 20 MG tablet       Orthopedic    Chronic pain of right knee    Current Assessment & Plan     - since fall earlier this year with history of prosthesis  - check Xray  - pt would like to see Ortho and referred to Ortho         Relevant Orders    Ambulatory Referral to Orthopedics    X-Ray Knee 1 or 2 View Right      Other Visit Diagnoses     Rhinopharyngitis    -  Primary    - supportive care          PLAN:    Orders Placed This Encounter    X-Ray Knee 1 or 2 View Right    Ambulatory Referral to Orthopedics    betamethasone acetate-betamethasone sodium phosphate injection 6 mg    betamethasone valerate 0.1% (VALISONE) 0.1 % Crea    DULoxetine (CYMBALTA) 60 MG capsule     Follow-up if no improvement or as previously recommended.     Dr. Rosario Lewis D.O.   [facility]

## 2019-03-12 ENCOUNTER — OFFICE VISIT (OUTPATIENT)
Dept: ORTHOPEDICS | Facility: CLINIC | Age: 54
End: 2019-03-12
Payer: MEDICAID

## 2019-03-12 VITALS — WEIGHT: 293 LBS | HEIGHT: 70 IN | BODY MASS INDEX: 41.95 KG/M2

## 2019-03-12 DIAGNOSIS — T84.84XA PAIN DUE TO TOTAL RIGHT KNEE REPLACEMENT, INITIAL ENCOUNTER: Primary | ICD-10-CM

## 2019-03-12 DIAGNOSIS — Z96.651 PAIN DUE TO TOTAL RIGHT KNEE REPLACEMENT, INITIAL ENCOUNTER: Primary | ICD-10-CM

## 2019-03-12 PROCEDURE — 99212 OFFICE O/P EST SF 10 MIN: CPT | Mod: PBBFAC,PN | Performed by: ORTHOPAEDIC SURGERY

## 2019-03-12 PROCEDURE — 99213 OFFICE O/P EST LOW 20 MIN: CPT | Mod: S$PBB,,, | Performed by: ORTHOPAEDIC SURGERY

## 2019-03-12 PROCEDURE — 99999 PR PBB SHADOW E&M-EST. PATIENT-LVL II: CPT | Mod: PBBFAC,,, | Performed by: ORTHOPAEDIC SURGERY

## 2019-03-12 PROCEDURE — 99213 PR OFFICE/OUTPT VISIT, EST, LEVL III, 20-29 MIN: ICD-10-PCS | Mod: S$PBB,,, | Performed by: ORTHOPAEDIC SURGERY

## 2019-03-12 PROCEDURE — 99999 PR PBB SHADOW E&M-EST. PATIENT-LVL II: ICD-10-PCS | Mod: PBBFAC,,, | Performed by: ORTHOPAEDIC SURGERY

## 2019-03-12 NOTE — PROGRESS NOTES
Subjective:      Patient ID: Danika Voss is a 53 y.o. female.    Chief Complaint: Pain, Swelling, and Injury of the Right Knee    HPI     They have experienced problems with their right knee over the past 4 years. The patient denies relevant history of injury/aggravation.  She had right knee replacement 4 years ago in [place].  She reports chronic pain ever since.  She is on Percocet from a pain clinic.  She has a history of similar problems with the prior left knee replacement and reports that revision did not help at all.  Pain is located diffusely Associated symptoms include Frequent falls.  Symptoms are aggravated by walking. They have been treated with Narcotic.   Symptoms have recently stayed the same. Ambulation reportedly has been impaired. Self care ADLs are painful.     Review of Systems   Constitution: Negative for fever and weight loss.   HENT: Negative for congestion.    Eyes: Negative for visual disturbance.   Cardiovascular: Negative for chest pain.   Respiratory: Negative for shortness of breath.    Hematologic/Lymphatic: Negative for bleeding problem. Does not bruise/bleed easily.   Skin: Negative for poor wound healing.   Musculoskeletal: Positive for joint pain.   Gastrointestinal: Negative for abdominal pain.   Genitourinary: Negative for dysuria.   Neurological: Negative for seizures.   Psychiatric/Behavioral: Negative for altered mental status.   Allergic/Immunologic: Negative for persistent infections.         Objective:            Ortho/SPM Exam    Right knee    [unfilled]    The patient is not in acute distress.   Sclerae normal  Body habitus is obese.  Respiratory distress:  none   The patient walks with a limp.  Hip irritability  negative.   The skin over the knee is has a healed scar.  Knee effusion 0  Tendernes is located diffusely with allodynia and grimacing  Range of motion- Flexion 90 deg, Extension 0 deg,   Ligament laxity exam:   MCL 0   LCL 0  Patellar apprehension  negative.  Popliteal cyst negative  Patellar crepitation absent.  Pulses DP present, PT present.  Motor normal 5/5 strength in all tested muscle groups.   Sensory normal.    I reviewed the relevant radiographic images for the patient's condition:  Most recently right knee films show well-positioned well-fixed right total knee. No evident complicating process          Assessment:       Encounter Diagnosis   Name Primary?    Pain due to total right knee replacement, initial encounter Yes          There is no objective evidence of any problem with the right knee implant.    The patient's deconditioning and obesity aggravate her situation.  Plan:       Danika was seen today for pain, swelling and injury.    Diagnoses and all orders for this visit:    Pain due to total right knee replacement, initial encounter        Given overall presentation I do not recommend any consideration of surgery without manifest evidence of implant failure.    Walker prescribed to prevent falls    Weight loss recommended

## 2019-03-12 NOTE — LETTER
March 12, 2019      Rosario Lewis, DO  [address]  [address]   [facility] [address]           [facility]  [address]  [facility] [address]  Phone: [phone]  Fax: [phone]          Patient: Danika Voss   MR Number: [id number]   YOB: 1965   Date of Visit: 3/12/2019       Dear Dr. Rosario Lewis:    Thank you for referring Danika Voss to me for evaluation. Attached you will find relevant portions of my assessment and plan of care.    If you have questions, please do not hesitate to call me. I look forward to following Danika Voss along with you.    Sincerely,    Jeremy Hand MD    Enclosure  CC:  No Recipients    If you would like to receive this communication electronically, please contact [email or web address] or [phone] to request more information on [employer] Link access.    For providers and/or their staff who would like to refer a patient to [facility], please contact us through our one-stop-shop provider referral line, [facility], at [phone].    If you feel you have received this communication in error or would no longer like to receive these types of communications, please e-mail [email or web address]

## 2019-03-18 NOTE — PROGRESS NOTES
FAMILY MEDICINE    Patient Active Problem List   Diagnosis    Essential hypertension    Morbid obesity with BMI of 40.0-44.9, adult    Recurrent major depressive disorder, in partial remission    Hypothyroidism due to acquired atrophy of thyroid    Chronic hepatitis C without hepatic coma    Gastroesophageal reflux disease with esophagitis    Opioid dependence    Chronic pain disorder    Family history of colon cancer    Hypertensive left ventricular hypertrophy, without heart failure    Other insomnia    Venous stasis ulcer with varicose veins    Dermatitis    Elevated LFTs    Generalized anxiety disorder    Psoriasis    Chronic pain of right knee       CC:   Chief Complaint   Patient presents with    Follow-up       SUBJECTIVE:  Danika Voss   is a 53 y.o. female  - with HTN, depression, hypothyroidism, h/o SVT, chronic pain (previously followed by Pain Management) and venous statis with ulcer for left lower leg that is healing (followed wound care) presents for routine follow-up.     Since last visit, she was seen by Ortho Dr. Hand for right knee pain s/p fall and history of right knee replacement. No issues with her hardware for noted. She is doing better and reports that pain has improved.    1. Chronic pain  - cervical, low back, and knees  Followed by Pain Management and reports plan for PARISH 5/2019.     Current medication: Oxycodone 5 mg TID, Gabapentin 600 mg six times a day = 3600 mg (max dosing)  - pt has tried to wean off of Gabapentin with significant increase in pain that worsened her depression, anxiety and BP  - current pain well controlled and hopeful that she can attempt to decrease again s/p PARISH  Current exercise regimen: none  Alternative medical regimen: none    Current symptoms:low back pain with radiation down bilateral legs and worst on days that she is most active  Current pain level: 4/10         ROS: Review of Systems   Constitutional: Negative for activity change,  appetite change, fatigue, fever and unexpected weight change.   HENT: Negative for nosebleeds.    Eyes: Negative for visual disturbance.   Respiratory: Negative for cough, chest tightness and shortness of breath.    Cardiovascular: Negative for chest pain and palpitations.   Gastrointestinal: Negative for abdominal distention, abdominal pain, blood in stool, constipation, diarrhea, nausea and vomiting.   Genitourinary: Negative for decreased urine volume and difficulty urinating.   Musculoskeletal: Positive for arthralgias and back pain. Negative for myalgias.   Skin: Negative for rash.   Neurological: Negative for dizziness, weakness, light-headedness and headaches.   Psychiatric/Behavioral: Negative for dysphoric mood. The patient is not nervous/anxious.        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    MRSA, Peptostreptococcus    NSAID induced gastritis 9/8/2015    Obesity     Parkinson disease 11/17/2015       Past Surgical History:   Procedure Laterality Date    ABCESS DRAINAGE Left 8/6/2015    left knee washout    CARDIAC SURGERY  4/7/2010    artery on wrong side, performed in [place]    CHOLECYSTECTOMY  3/2010    COLONOSCOPY N/A 7/27/2018    [facility]    ESOPHAGOGASTRODUODENOSCOPY (EGD) N/A 7/17/2018    [facility]    HYSTERECTOMY      No cervix    INCISION AND DRAINAGE LEG Left 2/2/2016    [facility]    INCISION AND DRAINAGE-KNEE Left 8/6/2015    [facility]    PERIPHERALLY INSERTED CENTRAL CATHETER INSERTION Right 8/6/2015    PLACEMENT-WOUND VAC Left 2/2/2016    Performed by [facility]ed Fuchs  MD [facility] at [facility]    REVISION-ARTHROPLASTY-KNEE-TOTAL Left 7/21/2015    [facility] Walsh [facility] at [facility]    shoulder cyst removal Left 2014    [facility]    TONSILLECTOMY      TOTAL KNEE ARTHROPLASTY Bilateral right 6/2014, left 9/30/2014    TOTAL KNEE ARTHROPLASTY Left 7/21/2015    revision       ALLERGIES:   Review of patient's allergies indicates:   Allergen Reactions    Fentanyl Other (See Comments)     Fentanyl patches burn skin    Lisinopril Other (See Comments)     cough    Nsaids (non-steroidal anti-inflammatory drug) Other (See Comments)     gastritis    Toradol [ketorolac] Nausea And Vomiting and Rash       MEDS:   Current Outpatient Medications:     albuterol 90 mcg/actuation inhaler, Inhale 2 puffs into the lungs every 6 (six) hours as needed for Wheezing. Rescue, Disp: 18 g, Rfl: 1    amitriptyline (ELAVIL) 25 MG tablet, Take 25 mg by mouth once daily., Disp: , Rfl: 1    carvedilol (COREG) 3.125 MG tablet, Take 1 tablet (3.125 mg total) by mouth 2 (two) times daily., Disp: 180 tablet, Rfl: 1    diltiaZEM (CARDIZEM CD) 240 MG 24 hr capsule, Take 1 capsule (240 mg total) by mouth once daily., Disp: 90 capsule, Rfl: 1    DULoxetine (CYMBALTA) 60 MG capsule, Take 1 capsule (60 mg total) by mouth once daily., Disp: 90 capsule, Rfl: 1    flecainide (TAMBOCOR) 50 MG Tab, Take 100 mg by mouth 2 (two) times daily., Disp: , Rfl:     gabapentin (NEURONTIN) 600 MG tablet, TAKE 1 TABLET BY MOUTH 6 TIMES A DAY, Disp: 180 tablet, Rfl: 0    hydrOXYzine (ATARAX) 50 MG tablet, Take 1 tablet (50 mg total) by mouth 4 (four) times daily as needed for Itching., Disp: 30 tablet, Rfl: 1    levothyroxine (SYNTHROID) 100 MCG tablet, Take 1 tablet (100 mcg total) by mouth once daily., Disp: 90 tablet, Rfl: 1    oxyCODONE (ROXICODONE) 5 MG immediate release tablet, Take 1 tablet (5 mg total) by mouth 3 (three) times daily., Disp: 90 tablet, Rfl: 0     "pantoprazole (PROTONIX) 20 MG tablet, Take 40 mg by mouth as needed., Disp: , Rfl:     valsartan-hydrochlorothiazide (DIOVAN-HCT) 160-12.5 mg per tablet, Take 1 tablet by mouth once daily., Disp: 90 tablet, Rfl: 1    betamethasone valerate 0.1% (VALISONE) 0.1 % Crea, Apply topically 2 (two) times daily. for 10 days, Disp: 45 g, Rfl: 5    traZODone (DESYREL) 50 MG tablet, TAKE 1 TABLET BY MOUTH EVERY NIGHT AS NEEDED FOR INSOMNIA, Disp: , Rfl: 3    Current Facility-Administered Medications:     cadexomer iodine 0.9 % gel, , Topical (Top), Once, Patricio Ibrahim MD    OBJECTIVE:   Vitals:    03/19/19 0836 03/19/19 1232   BP: 138/84 132/80   BP Location: Left arm    Patient Position: Sitting    BP Method: Large (Manual)    Pulse: 78    Temp: 97.6 °F (36.4 °C)    TempSrc: Oral    SpO2: 98%    Weight: (!) 143.6 kg (316 lb 9.6 oz)    Height: 5' 10" (1.778 m)      Body mass index is 45.43 kg/m².    Physical Exam   Constitutional: No distress.   Eyes: Conjunctivae are normal.   Neck: Neck supple.   Cardiovascular: Normal rate, regular rhythm, normal heart sounds and intact distal pulses. Exam reveals no gallop and no friction rub.   No murmur heard.  Pulmonary/Chest: Effort normal and breath sounds normal.   Musculoskeletal: She exhibits no edema.   Neurological: She is alert.       ASSESSMENT:  Problem List Items Addressed This Visit        Neuro    Chronic pain disorder (Chronic)    Overview     - followed by Dr. Jonathan SOLITARIO Pain Management Specialist         Current Assessment & Plan     - due to lumbar DJD  - opioids per Pain management  - continue Gabapentin 600 mg six times a day (have tried other regimens without relief). Discuss decreasing to QID s/p PARISH          Relevant Medications    gabapentin (NEURONTIN) 600 MG tablet       Cardiac/Vascular    Essential hypertension - Primary (Chronic)    Current Assessment & Plan     - improve control  - continue same meds               PLAN:   Orders Placed This " Encounter    gabapentin (NEURONTIN) 600 MG tablet     Follow-up in 3 months.     Dr. Rosario Lewis D.O.   [facility]

## 2019-03-19 ENCOUNTER — OFFICE VISIT (OUTPATIENT)
Dept: FAMILY MEDICINE | Facility: CLINIC | Age: 54
End: 2019-03-19
Payer: MEDICAID

## 2019-03-19 VITALS
SYSTOLIC BLOOD PRESSURE: 132 MMHG | DIASTOLIC BLOOD PRESSURE: 80 MMHG | HEIGHT: 70 IN | TEMPERATURE: 98 F | OXYGEN SATURATION: 98 % | WEIGHT: 293 LBS | HEART RATE: 78 BPM | BODY MASS INDEX: 41.95 KG/M2

## 2019-03-19 DIAGNOSIS — G89.4 CHRONIC PAIN DISORDER: Chronic | ICD-10-CM

## 2019-03-19 DIAGNOSIS — I10 ESSENTIAL HYPERTENSION: Primary | Chronic | ICD-10-CM

## 2019-03-19 PROCEDURE — 99213 OFFICE O/P EST LOW 20 MIN: CPT | Mod: S$PBB,,, | Performed by: FAMILY MEDICINE

## 2019-03-19 PROCEDURE — 99999 PR PBB SHADOW E&M-EST. PATIENT-LVL IV: ICD-10-PCS | Mod: PBBFAC,,, | Performed by: FAMILY MEDICINE

## 2019-03-19 PROCEDURE — 99214 OFFICE O/P EST MOD 30 MIN: CPT | Mod: PBBFAC,PN | Performed by: FAMILY MEDICINE

## 2019-03-19 PROCEDURE — 99999 PR PBB SHADOW E&M-EST. PATIENT-LVL IV: CPT | Mod: PBBFAC,,, | Performed by: FAMILY MEDICINE

## 2019-03-19 PROCEDURE — 99213 PR OFFICE/OUTPT VISIT, EST, LEVL III, 20-29 MIN: ICD-10-PCS | Mod: S$PBB,,, | Performed by: FAMILY MEDICINE

## 2019-03-19 RX ORDER — GABAPENTIN 600 MG/1
TABLET ORAL
Qty: 180 TABLET | Refills: 0 | Status: SHIPPED | OUTPATIENT
Start: 2019-03-19 | End: 2019-04-16 | Stop reason: SDUPTHER

## 2019-03-19 NOTE — ASSESSMENT & PLAN NOTE
- due to lumbar DJD  - opioids per Pain management  - continue Gabapentin 600 mg six times a day (have tried other regimens without relief). Discuss decreasing to QID s/p PARISH

## 2019-04-01 RX ORDER — TRAZODONE HYDROCHLORIDE 50 MG/1
TABLET ORAL
Qty: 30 TABLET | Refills: 3 | Status: SHIPPED | OUTPATIENT
Start: 2019-04-01 | End: 2019-06-20 | Stop reason: SDUPTHER

## 2019-04-16 DIAGNOSIS — G89.4 CHRONIC PAIN DISORDER: Chronic | ICD-10-CM

## 2019-04-16 RX ORDER — GABAPENTIN 600 MG/1
TABLET ORAL
Qty: 180 TABLET | Refills: 0 | Status: SHIPPED | OUTPATIENT
Start: 2019-04-16 | End: 2019-05-01 | Stop reason: SDUPTHER

## 2019-04-17 ENCOUNTER — TELEPHONE (OUTPATIENT)
Dept: FAMILY MEDICINE | Facility: CLINIC | Age: 54
End: 2019-04-17

## 2019-04-17 ENCOUNTER — PATIENT MESSAGE (OUTPATIENT)
Dept: FAMILY MEDICINE | Facility: CLINIC | Age: 54
End: 2019-04-17

## 2019-04-17 NOTE — TELEPHONE ENCOUNTER
----- Message from Polina Davila sent at 4/17/2019  9:12 AM CDT -----  Contact: self/[phone]  Patient called to speak with your office about a question she has.    Please call to discuss today.

## 2019-04-17 NOTE — TELEPHONE ENCOUNTER
----- Message from Anisha Spicer sent at 4/17/2019  7:22 AM CDT -----  Contact: Self [phone]  Patient is calling to get refills on her medication sent to [facility] in [place] [phone]    1. betamethasone valerate 0.1% (VALISONE) 0.1 % Crea 45 g     2. gabapentin (NEURONTIN) 600 MG tablet 180 tablet

## 2019-04-30 NOTE — PROGRESS NOTES
"FAMILY MEDICINE    Patient Active Problem List   Diagnosis    Essential hypertension    Morbid obesity with BMI of 40.0-44.9, adult    Recurrent major depressive disorder, in partial remission    Hypothyroidism due to acquired atrophy of thyroid    Chronic hepatitis C without hepatic coma    Gastroesophageal reflux disease with esophagitis    Opioid dependence    Chronic pain disorder    Family history of colon cancer    Hypertensive left ventricular hypertrophy, without heart failure    Other insomnia    Venous stasis ulcer with varicose veins    Dermatitis    Elevated LFTs    Generalized anxiety disorder    Psoriasis    Chronic pain of right knee    Acute cystitis with hematuria    Lower abdominal pain       CC:   Chief Complaint   Patient presents with    Abdominal Pain       SUBJECTIVE:  Danika Voss   is a 53 y.o. female  - with HTN, depression, hypothyroidism, h/o SVT, chronic pain (previously followed by Pain Management) and venous statis with ulcer for left lower leg that is healing (followed wound care) presents for "stomach pains"     1. Abdominal apin  Onset: 3 days  Location: across lower abdomen  Duration: waxes and wanes with worsening in last day and constatn  Character: low abdomen with radiation to back and lower abdomen both sides; cramping and burning 8/10  - pain worst with urination  Aggravating factors: urination, movement, constanct  Relieving factors: none  Associated symptoms: nausea and vomiting (last emesis yesterday evening), decreased appetite, hematuria  Negative symptoms: denies chest pain, weakness, SOB, lethargy, fevers        ROS: Review of Systems   Constitutional: Positive for activity change and appetite change. Negative for fatigue and fever.   Respiratory: Negative for cough, chest tightness and shortness of breath.    Cardiovascular: Negative for chest pain and palpitations.   Gastrointestinal: Positive for abdominal pain, nausea and vomiting. Negative for " abdominal distention, anal bleeding, blood in stool, constipation and diarrhea.   Endocrine: Negative for polydipsia, polyphagia and polyuria.   Genitourinary: Positive for dysuria, frequency, hematuria and urgency. Negative for difficulty urinating.   Musculoskeletal: Positive for arthralgias, back pain and myalgias.   Skin: Positive for rash (psorasis).   Neurological: Negative for dizziness, weakness, light-headedness and headaches.       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    MRSA, Peptostreptococcus    NSAID induced gastritis 9/8/2015    Obesity     Parkinson disease 11/17/2015       Past Surgical History:   Procedure Laterality Date    ABCESS DRAINAGE Left 8/6/2015    left knee washout    CARDIAC SURGERY  4/7/2010    artery on wrong side, performed in [place]    CHOLECYSTECTOMY  3/2010    COLONOSCOPY N/A 7/27/2018    [facility]    ESOPHAGOGASTRODUODENOSCOPY (EGD) N/A 7/17/2018    [facility]    HYSTERECTOMY      No cervix    INCISION AND DRAINAGE LEG Left 2/2/2016    [facility]    INCISION AND DRAINAGE-KNEE Left 8/6/2015    [facility] at [facility]    PERIPHERALLY INSERTED CENTRAL CATHETER INSERTION Right 8/6/2015    PLACEMENT-WOUND VAC Left 2/2/2016    [facility] at [facility]    REVISION-ARTHROPLASTY-KNEE-TOTAL Left 7/21/2015    [facility] Brittany Walsh [facility] at [facility]    shoulder cyst removal Left 2014    [facility]    TONSILLECTOMY      TOTAL KNEE ARTHROPLASTY Bilateral right 6/2014, left 9/30/2014    TOTAL KNEE ARTHROPLASTY Left  7/21/2015    revision       ALLERGIES:   Review of patient's allergies indicates:   Allergen Reactions    Fentanyl Other (See Comments)     Fentanyl patches burn skin  Burns her skin     Nsaids (non-steroidal anti-inflammatory drug) Other (See Comments)     gastritis  Stomach ulcers     Ketorolac Nausea And Vomiting and Rash    Lisinopril Other (See Comments)     cough  cough       MEDS:   Current Outpatient Medications:     albuterol 90 mcg/actuation inhaler, Inhale 2 puffs into the lungs every 6 (six) hours as needed for Wheezing. Rescue, Disp: 18 g, Rfl: 1    amitriptyline (ELAVIL) 25 MG tablet, Take 25 mg by mouth once daily., Disp: , Rfl: 1    carvedilol (COREG) 3.125 MG tablet, Take 1 tablet (3.125 mg total) by mouth 2 (two) times daily., Disp: 180 tablet, Rfl: 1    diltiaZEM (CARDIZEM CD) 240 MG 24 hr capsule, Take 1 capsule (240 mg total) by mouth once daily., Disp: 90 capsule, Rfl: 1    DULoxetine (CYMBALTA) 60 MG capsule, Take 1 capsule (60 mg total) by mouth once daily., Disp: 90 capsule, Rfl: 1    flecainide (TAMBOCOR) 50 MG Tab, Take 100 mg by mouth 2 (two) times daily., Disp: , Rfl:     gabapentin (NEURONTIN) 600 MG tablet, Take 1 tablet (600 mg total) by mouth every 4 (four) hours., Disp: 180 tablet, Rfl: 0    hydrOXYzine (ATARAX) 50 MG tablet, Take 1 tablet (50 mg total) by mouth 4 (four) times daily as needed for Itching., Disp: 30 tablet, Rfl: 1    levothyroxine (SYNTHROID) 100 MCG tablet, Take 1 tablet (100 mcg total) by mouth once daily., Disp: 90 tablet, Rfl: 1    oxyCODONE (ROXICODONE) 10 mg Tab immediate release tablet, TAKE 1 TABLET BY MOUTH TWICE A DAY TO 3 TIMES A DAY AS NEEDED FOR PAIN FOR 30 DAYS., Disp: , Rfl: 0    pantoprazole (PROTONIX) 20 MG tablet, Take 40 mg by mouth as needed., Disp: , Rfl:     traZODone (DESYREL) 50 MG tablet, TAKE 1 TABLET BY MOUTH EVERY NIGHT AS NEEDED FOR INSOMNIA, Disp: 30 tablet, Rfl: 3    valsartan-hydrochlorothiazide (DIOVAN-HCT) 160-12.5  "mg per tablet, Take 1 tablet by mouth once daily., Disp: 90 tablet, Rfl: 1    betamethasone valerate 0.1% (VALISONE) 0.1 % Crea, Apply topically 2 (two) times daily. for 10 days, Disp: 45 g, Rfl: 5    sulfamethoxazole-trimethoprim 800-160mg (BACTRIM DS) 800-160 mg Tab, Take 1 tablet by mouth 2 (two) times daily., Disp: 10 tablet, Rfl: 0    Current Facility-Administered Medications:     cadexomer iodine 0.9 % gel, , Topical (Top), Once, Patricio Ibrahim MD    cefTRIAXone injection 1 g, 1 g, Intramuscular, Once, Rosario Lewis,     OBJECTIVE:   Vitals:    05/01/19 0837   BP: 124/82   BP Location: Left arm   Patient Position: Sitting   BP Method: Large (Manual)   Pulse: 79   Temp: 97.7 °F (36.5 °C)   TempSrc: Oral   SpO2: 97%   Weight: (!) 144 kg (317 lb 8 oz)   Height: 5' 10" (1.778 m)     Body mass index is 45.56 kg/m².    Physical Exam   Constitutional: No distress.   Neck: Neck supple.   Cardiovascular: Normal rate, regular rhythm, normal heart sounds and intact distal pulses. Exam reveals no gallop and no friction rub.   No murmur heard.  Pulmonary/Chest: Effort normal and breath sounds normal.   Abdominal: Soft. Bowel sounds are normal. There is tenderness in the right lower quadrant, suprapubic area and left lower quadrant. There is no rigidity, no rebound and no guarding.   Musculoskeletal: She exhibits no edema.   Neurological: She is alert.       ASSESSMENT:  Problem List Items Addressed This Visit        Neuro    Chronic pain disorder (Chronic)    Overview     - followed by Dr. Jonathan SOLITARIO Pain Management Specialist         Relevant Medications    gabapentin (NEURONTIN) 600 MG tablet       Renal/    Acute cystitis with hematuria - Primary    Current Assessment & Plan     - send urine for culture  - Rocephin 1 gram IM today  - start Bactrim tomorrow  - f/u 1 week         Relevant Medications    cefTRIAXone injection 1 g (Start on 5/1/2019 10:30 AM)    sulfamethoxazole-trimethoprim 800-160mg (BACTRIM " DS) 800-160 mg Tab    Other Relevant Orders    POCT urine dipstick without microscope    Urine culture       GI    Lower abdominal pain    Current Assessment & Plan     - no rebound, guarding or fever  - appear to be 2/2 acute cystitis though discussed if any fevers, poor PO or worsening pain then rec to ER               PLAN:   Orders Placed This Encounter    Urine culture    POCT urine dipstick without microscope    gabapentin (NEURONTIN) 600 MG tablet    cefTRIAXone injection 1 g    sulfamethoxazole-trimethoprim 800-160mg (BACTRIM DS) 800-160 mg Tab     Follow-up in 1 week  Dr. Rosario Lewis D.O.   Family Medicine

## 2019-05-01 ENCOUNTER — OFFICE VISIT (OUTPATIENT)
Dept: FAMILY MEDICINE | Facility: CLINIC | Age: 54
End: 2019-05-01
Payer: MEDICAID

## 2019-05-01 VITALS
OXYGEN SATURATION: 97 % | HEIGHT: 70 IN | HEART RATE: 79 BPM | SYSTOLIC BLOOD PRESSURE: 124 MMHG | BODY MASS INDEX: 41.95 KG/M2 | TEMPERATURE: 98 F | DIASTOLIC BLOOD PRESSURE: 82 MMHG | WEIGHT: 293 LBS

## 2019-05-01 DIAGNOSIS — R10.30 LOWER ABDOMINAL PAIN: ICD-10-CM

## 2019-05-01 DIAGNOSIS — G89.4 CHRONIC PAIN DISORDER: Chronic | ICD-10-CM

## 2019-05-01 DIAGNOSIS — N30.01 ACUTE CYSTITIS WITH HEMATURIA: Primary | ICD-10-CM

## 2019-05-01 PROCEDURE — 99214 OFFICE O/P EST MOD 30 MIN: CPT | Mod: S$PBB,,, | Performed by: FAMILY MEDICINE

## 2019-05-01 PROCEDURE — 99214 PR OFFICE/OUTPT VISIT, EST, LEVL IV, 30-39 MIN: ICD-10-PCS | Mod: S$PBB,,, | Performed by: FAMILY MEDICINE

## 2019-05-01 PROCEDURE — 99999 PR PBB SHADOW E&M-EST. PATIENT-LVL IV: CPT | Mod: PBBFAC,,, | Performed by: FAMILY MEDICINE

## 2019-05-01 PROCEDURE — 99999 PR PBB SHADOW E&M-EST. PATIENT-LVL IV: ICD-10-PCS | Mod: PBBFAC,,, | Performed by: FAMILY MEDICINE

## 2019-05-01 PROCEDURE — 99214 OFFICE O/P EST MOD 30 MIN: CPT | Mod: PBBFAC,PN | Performed by: FAMILY MEDICINE

## 2019-05-01 RX ORDER — OXYCODONE HYDROCHLORIDE 10 MG/1
TABLET ORAL
Refills: 0 | COMMUNITY
Start: 2019-04-22 | End: 2019-06-20

## 2019-05-01 RX ORDER — CEFTRIAXONE 1 G/1
1 INJECTION, POWDER, FOR SOLUTION INTRAMUSCULAR; INTRAVENOUS ONCE
Status: COMPLETED | OUTPATIENT
Start: 2019-05-01 | End: 2019-05-01

## 2019-05-01 RX ORDER — CIPROFLOXACIN 500 MG/1
500 TABLET ORAL EVERY 12 HOURS
Qty: 14 TABLET | Refills: 0 | Status: CANCELLED | OUTPATIENT
Start: 2019-05-01 | End: 2019-05-08

## 2019-05-01 RX ORDER — GABAPENTIN 600 MG/1
600 TABLET ORAL EVERY 4 HOURS
Qty: 180 TABLET | Refills: 0 | Status: SHIPPED | OUTPATIENT
Start: 2019-05-01 | End: 2019-06-10 | Stop reason: SDUPTHER

## 2019-05-01 RX ORDER — GABAPENTIN 600 MG/1
TABLET ORAL
Qty: 180 TABLET | Refills: 0 | Status: CANCELLED | OUTPATIENT
Start: 2019-05-01

## 2019-05-01 RX ORDER — SULFAMETHOXAZOLE AND TRIMETHOPRIM 800; 160 MG/1; MG/1
1 TABLET ORAL 2 TIMES DAILY
Qty: 10 TABLET | Refills: 0 | Status: SHIPPED | OUTPATIENT
Start: 2019-05-01 | End: 2019-06-20 | Stop reason: ALTCHOICE

## 2019-05-01 RX ADMIN — CEFTRIAXONE SODIUM 1 G: 1 INJECTION, POWDER, FOR SOLUTION INTRAMUSCULAR; INTRAVENOUS at 09:05

## 2019-05-01 NOTE — ASSESSMENT & PLAN NOTE
- stable  - has not had PARISH so had not decreased Gabapentin  - tolerating  - continue same medication

## 2019-05-01 NOTE — ASSESSMENT & PLAN NOTE
- no rebound, guarding or fever  - appear to be 2/2 acute cystitis though discussed if any fevers, poor PO or worsening pain then rec to ER

## 2019-05-09 ENCOUNTER — TELEPHONE (OUTPATIENT)
Dept: FAMILY MEDICINE | Facility: CLINIC | Age: 54
End: 2019-05-09

## 2019-05-09 NOTE — TELEPHONE ENCOUNTER
----- Message from Kelley Arguello sent at 5/9/2019  8:37 AM CDT -----  Contact: [phone]/self  Patient would like to let you know that she is having Transportation issues. Please advise.

## 2019-05-09 NOTE — TELEPHONE ENCOUNTER
Patient stated she was having transportation issues with medicaid and that's they she missed her appointment just wanted to let the office know

## 2019-06-10 DIAGNOSIS — G89.4 CHRONIC PAIN DISORDER: Chronic | ICD-10-CM

## 2019-06-10 RX ORDER — GABAPENTIN 600 MG/1
600 TABLET ORAL EVERY 4 HOURS
Qty: 180 TABLET | Refills: 0 | Status: SHIPPED | OUTPATIENT
Start: 2019-06-10 | End: 2019-07-19 | Stop reason: SDUPTHER

## 2019-06-17 PROBLEM — R10.30 LOWER ABDOMINAL PAIN: Status: RESOLVED | Noted: 2019-05-01 | Resolved: 2019-06-17

## 2019-06-17 PROBLEM — N30.01 ACUTE CYSTITIS WITH HEMATURIA: Status: RESOLVED | Noted: 2019-05-01 | Resolved: 2019-06-17
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
## 2019-06-20 ENCOUNTER — OFFICE VISIT (OUTPATIENT)
Dept: FAMILY MEDICINE | Facility: CLINIC | Age: 54
End: 2019-06-20
Payer: MEDICARE

## 2019-06-20 VITALS
DIASTOLIC BLOOD PRESSURE: 96 MMHG | TEMPERATURE: 98 F | RESPIRATION RATE: 18 BRPM | BODY MASS INDEX: 41.95 KG/M2 | WEIGHT: 293 LBS | SYSTOLIC BLOOD PRESSURE: 150 MMHG | HEART RATE: 76 BPM | HEIGHT: 70 IN | OXYGEN SATURATION: 98 %

## 2019-06-20 DIAGNOSIS — I10 ESSENTIAL HYPERTENSION: Chronic | ICD-10-CM

## 2019-06-20 DIAGNOSIS — F33.1 MODERATE EPISODE OF RECURRENT MAJOR DEPRESSIVE DISORDER: ICD-10-CM

## 2019-06-20 DIAGNOSIS — I87.2 VENOUS STASIS DERMATITIS OF LEFT LOWER EXTREMITY: ICD-10-CM

## 2019-06-20 DIAGNOSIS — E03.4 HYPOTHYROIDISM DUE TO ACQUIRED ATROPHY OF THYROID: Chronic | ICD-10-CM

## 2019-06-20 DIAGNOSIS — R79.89 ELEVATED LFTS: ICD-10-CM

## 2019-06-20 DIAGNOSIS — B18.2 CHRONIC HEPATITIS C WITHOUT HEPATIC COMA: Chronic | ICD-10-CM

## 2019-06-20 DIAGNOSIS — R10.84 GENERALIZED ABDOMINAL PAIN: Primary | ICD-10-CM

## 2019-06-20 DIAGNOSIS — G89.4 CHRONIC PAIN DISORDER: Chronic | ICD-10-CM

## 2019-06-20 DIAGNOSIS — G47.09 OTHER INSOMNIA: ICD-10-CM

## 2019-06-20 PROCEDURE — 99999 PR PBB SHADOW E&M-EST. PATIENT-LVL IV: CPT | Mod: PBBFAC,,, | Performed by: FAMILY MEDICINE

## 2019-06-20 PROCEDURE — 99999 PR PBB SHADOW E&M-EST. PATIENT-LVL IV: ICD-10-PCS | Mod: PBBFAC,,, | Performed by: FAMILY MEDICINE

## 2019-06-20 PROCEDURE — 99214 OFFICE O/P EST MOD 30 MIN: CPT | Mod: S$GLB,,, | Performed by: FAMILY MEDICINE

## 2019-06-20 PROCEDURE — 99214 OFFICE O/P EST MOD 30 MIN: CPT | Mod: PBBFAC,PN | Performed by: FAMILY MEDICINE

## 2019-06-20 PROCEDURE — 99214 PR OFFICE/OUTPT VISIT, EST, LEVL IV, 30-39 MIN: ICD-10-PCS | Mod: S$GLB,,, | Performed by: FAMILY MEDICINE

## 2019-06-20 RX ORDER — HYDROXYZINE HYDROCHLORIDE 50 MG/1
50 TABLET, FILM COATED ORAL 4 TIMES DAILY PRN
Qty: 30 TABLET | Refills: 1 | Status: SHIPPED | OUTPATIENT
Start: 2019-06-20 | End: 2020-08-19 | Stop reason: ALTCHOICE

## 2019-06-20 RX ORDER — TRAZODONE HYDROCHLORIDE 50 MG/1
50 TABLET ORAL NIGHTLY PRN
Qty: 30 TABLET | Refills: 0 | Status: SHIPPED | OUTPATIENT
Start: 2019-06-20 | End: 2019-08-14 | Stop reason: SDUPTHER

## 2019-06-20 NOTE — ASSESSMENT & PLAN NOTE
- diffuse without rebound or guarding  - check CBC and CMP  - check CT  - rec to ER if any worsening, fever, N/V

## 2019-06-20 NOTE — ASSESSMENT & PLAN NOTE
- increased with acute pain  - no change in medication at this time and monitor s/p acute issue resolved

## 2019-06-20 NOTE — ASSESSMENT & PLAN NOTE
- worsening with anniversary of husbands death  - denies any thoughts of self harm   - complication by worsening pain  - monitor closely
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
## 2019-06-27 ENCOUNTER — OFFICE VISIT (OUTPATIENT)
Dept: FAMILY MEDICINE | Facility: CLINIC | Age: 54
End: 2019-06-27
Payer: MEDICARE

## 2019-06-27 ENCOUNTER — TELEPHONE (OUTPATIENT)
Dept: FAMILY MEDICINE | Facility: CLINIC | Age: 54
End: 2019-06-27

## 2019-06-27 ENCOUNTER — PATIENT MESSAGE (OUTPATIENT)
Dept: FAMILY MEDICINE | Facility: CLINIC | Age: 54
End: 2019-06-27

## 2019-06-27 VITALS
WEIGHT: 293 LBS | SYSTOLIC BLOOD PRESSURE: 120 MMHG | DIASTOLIC BLOOD PRESSURE: 86 MMHG | OXYGEN SATURATION: 98 % | HEIGHT: 70 IN | TEMPERATURE: 97 F | BODY MASS INDEX: 41.95 KG/M2 | HEART RATE: 83 BPM

## 2019-06-27 DIAGNOSIS — I10 ESSENTIAL HYPERTENSION: Chronic | ICD-10-CM

## 2019-06-27 DIAGNOSIS — E03.4 HYPOTHYROIDISM DUE TO ACQUIRED ATROPHY OF THYROID: Chronic | ICD-10-CM

## 2019-06-27 DIAGNOSIS — N30.00 ACUTE CYSTITIS WITHOUT HEMATURIA: Primary | ICD-10-CM

## 2019-06-27 DIAGNOSIS — R10.84 GENERALIZED ABDOMINAL PAIN: Primary | ICD-10-CM

## 2019-06-27 DIAGNOSIS — B18.2 CHRONIC HEPATITIS C WITHOUT HEPATIC COMA: Chronic | ICD-10-CM

## 2019-06-27 DIAGNOSIS — R79.89 ELEVATED LFTS: ICD-10-CM

## 2019-06-27 DIAGNOSIS — E55.9 VITAMIN D DEFICIENCY: ICD-10-CM

## 2019-06-27 DIAGNOSIS — R11.0 NAUSEA: ICD-10-CM

## 2019-06-27 DIAGNOSIS — K21.00 GASTROESOPHAGEAL REFLUX DISEASE WITH ESOPHAGITIS: Chronic | ICD-10-CM

## 2019-06-27 PROCEDURE — 99999 PR PBB SHADOW E&M-EST. PATIENT-LVL IV: CPT | Mod: PBBFAC,,, | Performed by: FAMILY MEDICINE

## 2019-06-27 PROCEDURE — 99214 PR OFFICE/OUTPT VISIT, EST, LEVL IV, 30-39 MIN: ICD-10-PCS | Mod: S$PBB,,, | Performed by: FAMILY MEDICINE

## 2019-06-27 PROCEDURE — 99214 OFFICE O/P EST MOD 30 MIN: CPT | Mod: S$PBB,,, | Performed by: FAMILY MEDICINE

## 2019-06-27 PROCEDURE — 99214 OFFICE O/P EST MOD 30 MIN: CPT | Mod: PBBFAC,PN | Performed by: FAMILY MEDICINE

## 2019-06-27 PROCEDURE — 99999 PR PBB SHADOW E&M-EST. PATIENT-LVL IV: ICD-10-PCS | Mod: PBBFAC,,, | Performed by: FAMILY MEDICINE

## 2019-06-27 RX ORDER — ONDANSETRON 4 MG/1
4 TABLET, FILM COATED ORAL EVERY 6 HOURS PRN
Qty: 20 TABLET | Refills: 1 | Status: SHIPPED | OUTPATIENT
Start: 2019-06-27 | End: 2020-03-23

## 2019-06-27 RX ORDER — PANTOPRAZOLE SODIUM 40 MG/1
40 TABLET, DELAYED RELEASE ORAL DAILY
Qty: 90 TABLET | Refills: 0 | Status: SHIPPED | OUTPATIENT
Start: 2019-06-27 | End: 2019-09-21 | Stop reason: SDUPTHER

## 2019-06-27 RX ORDER — ERGOCALCIFEROL 1.25 MG/1
50000 CAPSULE ORAL
Qty: 12 CAPSULE | Refills: 0 | Status: SHIPPED | OUTPATIENT
Start: 2019-06-27 | End: 2020-03-23

## 2019-06-27 NOTE — ASSESSMENT & PLAN NOTE
- unknown etiology   - hemodynamically stable and non-septic appearing  - see CT  - LFTs worsened  - Lipase normal  - CBC without anemia or leukocytosis  - restart her PPI  - supportive care and rec see GI if no relief.

## 2019-06-27 NOTE — TELEPHONE ENCOUNTER
Informed patient she is schedule to take them every four hours which is 6 times daily patient stated the rx she had was four times a day and told her to look it should say every four hours. Patient said it did say that. No further concerns

## 2019-06-27 NOTE — ASSESSMENT & PLAN NOTE
- worsening with chronic hepatitis C  - she was referred to hepatology in the past but has not returned for follow-up  - discussed concern and rec evaluation by Hepatology

## 2019-06-27 NOTE — TELEPHONE ENCOUNTER
----- Message from Norma George sent at 6/27/2019 12:11 PM CDT -----  Contact: self [phone]  Patient is requesting an increase in medication. She would like her gabapentin to be placed back up to 6 pills a day as she is in severe pain

## 2019-06-27 NOTE — TELEPHONE ENCOUNTER
----- Message from Rosario Lewis DO sent at 6/27/2019  7:54 AM CDT -----  Please call lab and see if lipase can be added on. Labs done today

## 2019-06-27 NOTE — TELEPHONE ENCOUNTER
Called and spoke with pt regarding labs. Vitamin D low and rec D2 42248 units weekly for 12 weeks then reduce to D3 OTC 5000 units daily.  LFT elevated and discussed with pt. Needs to follow-up with Hepatology. Lipase normal  Urine culture added to UA.     Dr. Rosario Lewis D.O.   [facility]

## 2019-06-27 NOTE — ASSESSMENT & PLAN NOTE
Lab Results   Component Value Date    TSH 0.543 06/27/2019    FREET4 1.20 06/27/2019     - well controlled  - continue current meds

## 2019-06-28 ENCOUNTER — TELEPHONE (OUTPATIENT)
Dept: FAMILY MEDICINE | Facility: CLINIC | Age: 54
End: 2019-06-28

## 2019-06-28 NOTE — TELEPHONE ENCOUNTER
"Patient called back and stated "I spoke to you yesterday and you were wrong" when asked what happened patient stated that her medication bottle said to take pills four times a day, I reassure patient that the prescription Dr. Lewis sent in on 6/10/19 stated to take every four hours which is 6 times daily. Patient stated "oh this was filled by someone else, I'll call the pharmacy" advised patient to call the office if she had any other concerns  "

## 2019-06-28 NOTE — TELEPHONE ENCOUNTER
----- Message from Denise Pizarro sent at 6/28/2019 11:30 AM CDT -----  Contact: self, [phone] (M)  Patient states she spoke with nurse yesterday but correct dosage of Gabapentin prescription has not been sent to pharmacy. States she takes it 6 times per day. Please advise.

## 2019-07-19 DIAGNOSIS — G89.4 CHRONIC PAIN DISORDER: Chronic | ICD-10-CM

## 2019-07-19 NOTE — TELEPHONE ENCOUNTER
----- Message from Brittany Piña sent at 7/19/2019  1:58 PM CDT -----  Contact: Self [phone]  Patient would like a refill for gabapentin (NEURONTIN) 600 MG tablet sent to [facility]/PHARMACY #[id number] - [place], [place] - [id number] Please advise.

## 2019-07-22 RX ORDER — GABAPENTIN 600 MG/1
600 TABLET ORAL 4 TIMES DAILY
Qty: 120 TABLET | Refills: 0 | Status: SHIPPED | OUTPATIENT
Start: 2019-07-22 | End: 2019-07-24

## 2019-07-23 DIAGNOSIS — G89.4 CHRONIC PAIN DISORDER: Chronic | ICD-10-CM

## 2019-07-23 RX ORDER — GABAPENTIN 600 MG/1
600 TABLET ORAL EVERY 4 HOURS
Qty: 180 TABLET | Refills: 0 | OUTPATIENT
Start: 2019-07-23

## 2019-07-23 NOTE — TELEPHONE ENCOUNTER
----- Message from Tamy Barrientos sent at 7/23/2019  9:16 AM CDT -----  Contact: self / [phone]  Patient is requesting a call back regarding, her medication below. She said she needs the medication by today. She will be going out of town. Please advise         gabapentin (NEURONTIN) 600 MG tablet        Pharmacy     [facility]/PHARMACY #[id number] - [place], [place] - [address] [facility]

## 2019-07-23 NOTE — TELEPHONE ENCOUNTER
----- Message from Rehana Duran sent at 7/23/2019  3:53 PM CDT -----  Contact: Self/ [phone]  Patient is requesting a callback regarding personal issues.    Please call.

## 2019-07-23 NOTE — TELEPHONE ENCOUNTER
Spoke with patient. Pt states that she has been having a hard time coping with the death of her  and is planning to take a trip to spend some time with family. Pt leaves tomorrow. Pt requests that Dr Lewis authorizes for her pharmacy to release her Gabapentin early, since she will be out of town. Also, pt would like to continue with previous dosage of Gabapentin until she returns. Pt states that Dr Lewis suggests that she reduce from 6 a day to 4 a day. Pt would like a call back. Informed patient that I would forward a message to Dr Lewis and would call back with her recommendations. Pt verbalizes understanding.

## 2019-07-24 ENCOUNTER — TELEPHONE (OUTPATIENT)
Dept: FAMILY MEDICINE | Facility: CLINIC | Age: 54
End: 2019-07-24

## 2019-07-24 RX ORDER — GABAPENTIN 600 MG/1
600 TABLET ORAL EVERY 4 HOURS PRN
Qty: 180 TABLET | Refills: 0 | Status: SHIPPED | OUTPATIENT
Start: 2019-07-24 | End: 2019-08-15 | Stop reason: SDUPTHER

## 2019-07-24 NOTE — TELEPHONE ENCOUNTER
----- Message from Kelley Arguello sent at 7/24/2019 11:28 AM CDT -----  Contact: self [phone]  Patient is requesting prior authorization of gabapentin (NEURONTIN) 600 MG tablet. Please call

## 2019-07-24 NOTE — TELEPHONE ENCOUNTER
Spoke with patient and she stated she wanted Dr. Lewis to call the pharmacy so that she could get her medication refilled before the 28th. I informed patient that the pharmacy refills when due and we don't control that aspect of the medication. Informed that Dr. Lewis sent a refill to the pharmacy but she would have to call the pharmacy to see when it would be refilled.

## 2019-07-30 PROBLEM — M47.896 OTHER SPONDYLOSIS, LUMBAR REGION: Status: ACTIVE | Noted: 2018-09-11

## 2019-08-14 DIAGNOSIS — G47.09 OTHER INSOMNIA: ICD-10-CM

## 2019-08-14 RX ORDER — TRAZODONE HYDROCHLORIDE 50 MG/1
50 TABLET ORAL NIGHTLY PRN
Qty: 30 TABLET | Refills: 0 | Status: SHIPPED | OUTPATIENT
Start: 2019-08-14 | End: 2019-09-10 | Stop reason: SDUPTHER

## 2019-08-15 ENCOUNTER — TELEPHONE (OUTPATIENT)
Dept: FAMILY MEDICINE | Facility: CLINIC | Age: 54
End: 2019-08-15

## 2019-08-15 DIAGNOSIS — G89.4 CHRONIC PAIN DISORDER: Chronic | ICD-10-CM

## 2019-08-15 RX ORDER — GABAPENTIN 600 MG/1
600 TABLET ORAL EVERY 4 HOURS PRN
Qty: 180 TABLET | Refills: 0 | Status: SHIPPED | OUTPATIENT
Start: 2019-08-15 | End: 2019-09-04 | Stop reason: SDUPTHER

## 2019-08-15 NOTE — TELEPHONE ENCOUNTER
Spoke to patient, she has been taking 6 (600 mg) gabapentin daily, she has run out of the last script and is requesting refill with enough medication to allot for taking 6 tablets daily. Please advise.

## 2019-08-15 NOTE — TELEPHONE ENCOUNTER
"Notified pt per Dr. Lewis "Her prescription is for 6 times a day (every 4 hours). I refilled it again but she will need to be seen for further refills since we previously discussed decreasing dose. "  "

## 2019-08-15 NOTE — TELEPHONE ENCOUNTER
----- Message from Norma George sent at 8/15/2019 12:35 PM CDT -----  Contact: self [phone]  Patient has some questions about her medication. Please call and advise.

## 2019-08-15 NOTE — TELEPHONE ENCOUNTER
Spoke to patient, only appt available tomorrow is an urgent. She will need to call first thing tomorrow to schedule that appt. Advised if she cannot get in with Dr. Lewis she should see another provider or go to urgent care/ER

## 2019-08-15 NOTE — TELEPHONE ENCOUNTER
----- Message from Anna Rutherford sent at 8/15/2019  9:46 AM CDT -----  Contact: [phone]-self  Patient requesting to be seen tomorrow for a swollen ankle and infection. Please call.

## 2019-08-16 ENCOUNTER — TELEPHONE (OUTPATIENT)
Dept: FAMILY MEDICINE | Facility: CLINIC | Age: 54
End: 2019-08-16

## 2019-08-16 NOTE — TELEPHONE ENCOUNTER
----- Message from Kelley Arguello sent at 8/16/2019 10:22 AM CDT -----  Contact: [phone]/self  Patient requesting to speak with you regarding getting prior authorization for     gabapentin (NEURONTIN) 600 MG tablet. Please advise.

## 2019-08-16 NOTE — TELEPHONE ENCOUNTER
----- Message from Joshua Carreno MA sent at 8/16/2019 11:08 AM CDT -----  Contact: Pt  Pt would like to be called back regarding a call back from nurse.       Pt can be reached at [phone].        Thanks

## 2019-08-16 NOTE — TELEPHONE ENCOUNTER
Call [facility] and stated they just needed an override for the medication but she can not get it refilled until 8/20/19. Called patient and informed that I spoke to pharmacy and medication can not be filled unit the 20th, patient stated they only gave her 120 pills the last time before she left for [place]. I informed patient that Dr. Lewis sent the refill request for every 4 hours but I would call the pharmacy back.

## 2019-09-03 PROBLEM — K74.60 CIRRHOSIS OF LIVER WITHOUT ASCITES: Status: ACTIVE | Noted: 2019-09-03

## 2019-09-03 PROBLEM — R10.84 GENERALIZED ABDOMINAL PAIN: Status: RESOLVED | Noted: 2019-06-20 | Resolved: 2019-09-03
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
## 2019-09-04 ENCOUNTER — OFFICE VISIT (OUTPATIENT)
Dept: FAMILY MEDICINE | Facility: CLINIC | Age: 54
End: 2019-09-04
Payer: MEDICAID

## 2019-09-04 VITALS
TEMPERATURE: 98 F | HEART RATE: 75 BPM | BODY MASS INDEX: 41.71 KG/M2 | DIASTOLIC BLOOD PRESSURE: 87 MMHG | OXYGEN SATURATION: 98 % | RESPIRATION RATE: 18 BRPM | WEIGHT: 291.31 LBS | SYSTOLIC BLOOD PRESSURE: 127 MMHG | HEIGHT: 70 IN

## 2019-09-04 DIAGNOSIS — I83.029 VENOUS ULCER OF LEFT LEG: ICD-10-CM

## 2019-09-04 DIAGNOSIS — I10 ESSENTIAL HYPERTENSION: Primary | Chronic | ICD-10-CM

## 2019-09-04 DIAGNOSIS — L97.929 VENOUS ULCER OF LEFT LEG: ICD-10-CM

## 2019-09-04 DIAGNOSIS — F33.1 MODERATE EPISODE OF RECURRENT MAJOR DEPRESSIVE DISORDER: ICD-10-CM

## 2019-09-04 DIAGNOSIS — R79.89 ELEVATED LFTS: ICD-10-CM

## 2019-09-04 DIAGNOSIS — B18.2 CHRONIC HEPATITIS C WITHOUT HEPATIC COMA: Chronic | ICD-10-CM

## 2019-09-04 DIAGNOSIS — G89.4 CHRONIC PAIN DISORDER: Chronic | ICD-10-CM

## 2019-09-04 DIAGNOSIS — K74.60 CIRRHOSIS OF LIVER WITHOUT ASCITES, UNSPECIFIED HEPATIC CIRRHOSIS TYPE: ICD-10-CM

## 2019-09-04 DIAGNOSIS — J31.0 CHRONIC RHINITIS: ICD-10-CM

## 2019-09-04 DIAGNOSIS — F41.1 GENERALIZED ANXIETY DISORDER: ICD-10-CM

## 2019-09-04 PROCEDURE — 99214 PR OFFICE/OUTPT VISIT, EST, LEVL IV, 30-39 MIN: ICD-10-PCS | Mod: S$GLB,,, | Performed by: FAMILY MEDICINE

## 2019-09-04 PROCEDURE — 99999 PR PBB SHADOW E&M-EST. PATIENT-LVL V: ICD-10-PCS | Mod: PBBFAC,,, | Performed by: FAMILY MEDICINE

## 2019-09-04 PROCEDURE — 99214 OFFICE O/P EST MOD 30 MIN: CPT | Mod: S$GLB,,, | Performed by: FAMILY MEDICINE

## 2019-09-04 PROCEDURE — 99215 OFFICE O/P EST HI 40 MIN: CPT | Mod: PBBFAC,PN | Performed by: FAMILY MEDICINE

## 2019-09-04 PROCEDURE — 99999 PR PBB SHADOW E&M-EST. PATIENT-LVL V: CPT | Mod: PBBFAC,,, | Performed by: FAMILY MEDICINE

## 2019-09-04 RX ORDER — GABAPENTIN 600 MG/1
600 TABLET ORAL 4 TIMES DAILY
Qty: 120 TABLET | Refills: 2 | Status: SHIPPED | OUTPATIENT
Start: 2019-09-04 | End: 2019-12-24

## 2019-09-04 RX ORDER — ALBUTEROL SULFATE 90 UG/1
2 AEROSOL, METERED RESPIRATORY (INHALATION) EVERY 6 HOURS PRN
Qty: 18 G | Refills: 1 | Status: CANCELLED | OUTPATIENT
Start: 2019-09-04 | End: 2020-09-03

## 2019-09-04 RX ORDER — MONTELUKAST SODIUM 10 MG/1
10 TABLET ORAL NIGHTLY
Qty: 90 TABLET | Refills: 0 | Status: SHIPPED | OUTPATIENT
Start: 2019-09-04 | End: 2019-11-30 | Stop reason: SDUPTHER

## 2019-09-04 RX ORDER — BUPRENORPHINE HYDROCHLORIDE 300 UG/1
1 FILM, SOLUBLE BUCCAL 2 TIMES DAILY
Refills: 0 | COMMUNITY
Start: 2019-08-27 | End: 2019-12-24 | Stop reason: DRUGHIGH

## 2019-09-04 RX ORDER — OXYCODONE HCL 10 MG/1
10 TABLET, FILM COATED, EXTENDED RELEASE ORAL 2 TIMES DAILY
COMMUNITY
End: 2019-10-11 | Stop reason: SDUPTHER

## 2019-09-04 NOTE — ASSESSMENT & PLAN NOTE
- never treated and noncompliance with follow-up  - counseling on hepatitis C  - monitor LFTs and AFP

## 2019-09-04 NOTE — ASSESSMENT & PLAN NOTE
- with stasis dermatitis recently seen in the ER  - no signs of infection  - history of chronic wound and refer to Wound Care for assistance

## 2019-09-04 NOTE — ASSESSMENT & PLAN NOTE
- stable with fluctuation for 's birthday  - denies thoughts of selfharm  - has been unable to find a psychiatrist or counselor   - continue current meds

## 2019-09-04 NOTE — ASSESSMENT & PLAN NOTE
- was recently started on Belbuca by Pain Management and still on Oxycontin 10 mg BID  - recommend decrease Gabapentin from 600 mg 6 times a day to 600 mg QID and then goal down to TID  - discussed recommendation for diet, exercise and weight loss

## 2019-09-10 DIAGNOSIS — G47.09 OTHER INSOMNIA: ICD-10-CM

## 2019-09-10 RX ORDER — TRAZODONE HYDROCHLORIDE 50 MG/1
50 TABLET ORAL NIGHTLY PRN
Qty: 30 TABLET | Refills: 2 | Status: SHIPPED | OUTPATIENT
Start: 2019-09-10 | End: 2019-11-30 | Stop reason: SDUPTHER

## 2019-09-21 DIAGNOSIS — K21.00 GASTROESOPHAGEAL REFLUX DISEASE WITH ESOPHAGITIS: Chronic | ICD-10-CM

## 2019-09-23 RX ORDER — PANTOPRAZOLE SODIUM 40 MG/1
TABLET, DELAYED RELEASE ORAL
Qty: 30 TABLET | Refills: 2 | Status: SHIPPED | OUTPATIENT
Start: 2019-09-23 | End: 2020-01-21

## 2019-10-11 ENCOUNTER — HOSPITAL ENCOUNTER (OUTPATIENT)
Dept: WOUND CARE | Facility: HOSPITAL | Age: 54
Discharge: HOME | End: 2019-10-11
Attending: EMERGENCY MEDICINE
Payer: MEDICARE

## 2019-10-11 VITALS
SYSTOLIC BLOOD PRESSURE: 146 MMHG | HEART RATE: 86 BPM | HEIGHT: 70 IN | DIASTOLIC BLOOD PRESSURE: 83 MMHG | WEIGHT: 291 LBS | BODY MASS INDEX: 41.66 KG/M2 | TEMPERATURE: 98 F

## 2019-10-11 DIAGNOSIS — I87.2 VENOUS STASIS DERMATITIS OF LEFT LOWER EXTREMITY: Primary | ICD-10-CM

## 2019-10-11 DIAGNOSIS — L97.929 VENOUS ULCER OF LEFT LEG: ICD-10-CM

## 2019-10-11 DIAGNOSIS — I83.029 VENOUS ULCER OF LEFT LEG: ICD-10-CM

## 2019-10-11 PROCEDURE — 99213 OFFICE O/P EST LOW 20 MIN: CPT

## 2019-10-11 RX ORDER — OXYCODONE HYDROCHLORIDE 10 MG/1
10 TABLET ORAL 2 TIMES DAILY
Refills: 0 | COMMUNITY
Start: 2019-10-03 | End: 2020-08-19 | Stop reason: ALTCHOICE

## 2019-10-11 RX ORDER — BETAMETHASONE VALERATE 1.2 MG/G
CREAM TOPICAL
Refills: 5 | COMMUNITY
Start: 2019-09-04 | End: 2019-12-24 | Stop reason: SDUPTHER

## 2019-10-11 RX ORDER — LEVOTHYROXINE SODIUM 100 UG/1
TABLET ORAL
COMMUNITY
Start: 2019-10-10 | End: 2019-11-11 | Stop reason: SDUPTHER

## 2019-10-11 RX ORDER — DULOXETIN HYDROCHLORIDE 60 MG/1
60 CAPSULE, DELAYED RELEASE ORAL DAILY
Refills: 1 | COMMUNITY
Start: 2019-10-01 | End: 2020-03-18

## 2019-10-11 NOTE — PROGRESS NOTES
Subjective:       Patient ID: Danika Voss is a 54 y.o. female.    Chief Complaint: Non-healing Wound (L lower leg) and Venous Stasis    10/11/19: Re-admit for venous stasis dermatitis to L lateral lower leg. Patient reports that the area began to swell with redness and clear weeping and burning sensation that is constant. She also states that she has been on antibiotics 3 times within the last 3 months, she thinks it was cipro. She has a scheduled appointment with the pain clinic on 10/16/19. Pulses palpable and present with doppler DP/PT. PO abx started today, patient verbalized understanding and agreed.   Hx as above w/o c/o fever or chills.  Review of Systems    Objective:    L leg as noted w/ erythema & hyperemia.  Physical Exam    Assessment:       1. Venous stasis dermatitis of left lower extremity    2. Venous ulcer of left leg           Wound 10/11/19 1035 Venous Ulcer lateral Leg #1 (Active)   10/11/19 1035    Pre-existing:    Primary Wound Type: Venous ulcer   Side: Left   Orientation: lateral   Location: Leg   Wound/PI Number (optional): #1   Ankle-Brachial Index:    Pulses: palpable DP/PT   Removal Indication and Assessment:    Wound Outcome:    (Retired) Wound Type:    (Retired) Wound Length (cm):    (Retired) Wound Width (cm):    (Retired) Depth (cm):    Wound Description (Comments):    Removal Indications:    Wound Image    10/11/2019 10:00 AM   Wound WDL ex 10/11/2019 10:00 AM   Dressing Appearance Open to air 10/11/2019 10:00 AM   Drainage Amount Moderate 10/11/2019 10:00 AM   Drainage Characteristics/Odor Clear 10/11/2019 10:00 AM   Appearance Pink;Red;Dry 10/11/2019 10:00 AM   Tissue loss description Partial thickness 10/11/2019 10:00 AM   Red (%), Wound Tissue Color 100 % 10/11/2019 10:00 AM   Periwound Area Dry;Redness;Swelling 10/11/2019 10:00 AM   Wound Edges Undefined 10/11/2019 10:00 AM   Wound Length (cm) 11.2 cm 10/11/2019 10:00 AM   Wound Width (cm) 7.5 cm 10/11/2019 10:00 AM   Wound  Depth (cm) 0.1 cm 10/11/2019 10:00 AM   Wound Volume (cm^3) 8.4 cm^3 10/11/2019 10:00 AM   Wound Surface Area (cm^2) 84 cm^2 10/11/2019 10:00 AM   Care Cleansed with:;Sterile normal saline;Soap and water 10/11/2019 10:00 AM   Dressing Applied;Foam;Tubular bandage 10/11/2019 10:00 AM   Periwound Care Skin barrier film applied 10/11/2019 10:00 AM   Compression Tubular elasticized bandage 10/11/2019 10:00 AM   Dressing Change Due 10/14/19 10/11/2019 10:00 AM       Venous Ulcer L lateral leg #1    Cleanse wound with: NS  Lidocaine: PRN debridement/discomfort in clinic  Periwound care: Cavilon periwound, betamethasone to excoriated areas   Primary dressing: Aquacel foam border x2 to cover entire area.   Edema control: Tubigrip F toe to knee  Frequency: Twice weekly  Follow-up: 1 week with Dr Ibrahim  Other Orders: Rx for PO doxycycline and PO pain medicine given to patient in clinic today 10/11/19. (100mg. Bid)    Plan:                Follow up in about 1 week (around 10/18/2019).

## 2019-10-18 ENCOUNTER — HOSPITAL ENCOUNTER (OUTPATIENT)
Dept: WOUND CARE | Facility: HOSPITAL | Age: 54
Discharge: HOME | End: 2019-10-18
Attending: EMERGENCY MEDICINE
Payer: MEDICARE

## 2019-10-18 VITALS
TEMPERATURE: 98 F | HEART RATE: 72 BPM | DIASTOLIC BLOOD PRESSURE: 84 MMHG | SYSTOLIC BLOOD PRESSURE: 132 MMHG | BODY MASS INDEX: 41.66 KG/M2 | WEIGHT: 291 LBS | HEIGHT: 70 IN

## 2019-10-18 DIAGNOSIS — L97.929 VENOUS ULCER OF LEFT LEG: Primary | ICD-10-CM

## 2019-10-18 DIAGNOSIS — I83.029 VENOUS ULCER OF LEFT LEG: Primary | ICD-10-CM

## 2019-10-18 PROCEDURE — 99213 OFFICE O/P EST LOW 20 MIN: CPT

## 2019-10-18 NOTE — PROGRESS NOTES
Subjective:       Patient ID: Danika Voss is a 54 y.o. female.    Chief Complaint: Venous Ulcer    10/11/19: Re-admit for venous stasis dermatitis to L lateral lower leg. Patient reports that the area began to swell with redness and clear weeping and burning sensation that is constant. She also states that she has been on antibiotics 3 times within the last 3 months, she thinks it was cipro. She has a scheduled appointment with the pain clinic on 10/16/19. Pulses palpable and present with doppler DP/PT. PO abx started today, patient verbalized understanding and agreed.     10/18/19: F/u with Dr. Ibrahim. Wound has decreased in size since last clinic visit. Continue po antibiotics and current wound care treatment.  Pt. Reports < wound site pain than on prev. Exam.  Review of Systems    Objective:    Wound smaller per staff w/o Marilu's sign.  Physical Exam    Assessment:       1. Venous ulcer of left leg           Wound 10/11/19 1035 Venous Ulcer lateral Leg #1 (Active)   10/11/19 1035    Pre-existing:    Primary Wound Type: Venous ulcer   Side: Left   Orientation: lateral   Location: Leg   Wound/PI Number (optional): #1   Ankle-Brachial Index:    Pulses: palpable DP/PT   Removal Indication and Assessment:    Wound Outcome:    (Retired) Wound Type:    (Retired) Wound Length (cm):    (Retired) Wound Width (cm):    (Retired) Depth (cm):    Wound Description (Comments):    Removal Indications:    Dressing Appearance Intact 10/18/2019 10:00 AM   Drainage Amount Scant 10/18/2019 10:00 AM   Drainage Characteristics/Odor Serosanguineous 10/18/2019 10:00 AM   Appearance Pink;Red;Moist 10/18/2019 10:00 AM   Tissue loss description Partial thickness 10/18/2019 10:00 AM   Red (%), Wound Tissue Color 100 % 10/18/2019 10:00 AM   Periwound Area Redness;Dry;Edematous 10/18/2019 10:00 AM   Wound Edges Defined 10/18/2019 10:00 AM   Wound Length (cm) 0.3 cm 10/18/2019 10:00 AM   Wound Width (cm) 0.2 cm 10/18/2019 10:00 AM   Wound  Depth (cm) 0.1 cm 10/18/2019 10:00 AM   Wound Volume (cm^3) 0.01 cm^3 10/18/2019 10:00 AM   Wound Surface Area (cm^2) 0.06 cm^2 10/18/2019 10:00 AM   Care Cleansed with:;Sterile normal saline 10/18/2019 10:00 AM   Dressing Applied;Tubular bandage;Island/border;Other (see comments) 10/18/2019 10:00 AM   Compression Tubular elasticized bandage 10/18/2019 10:00 AM   Dressing Change Due 10/21/19 10/18/2019 10:00 AM       Venous Ulcer L lateral leg #1    Cleanse wound with: NS  Periwound care: Cavilon periwound, betamethasone to excoriated areas   Primary dressing: Aquacel foam border to cover entire area.   Edema control: Tubigrip F toe to knee  Frequency: Twice weekly  Other Orders: Continue PO doxycycline       Plan:                Follow up in about 1 week (around 10/25/2019) for Dr. Ibrahim.

## 2019-11-11 DIAGNOSIS — E03.4 HYPOTHYROIDISM DUE TO ACQUIRED ATROPHY OF THYROID: Primary | Chronic | ICD-10-CM

## 2019-11-11 RX ORDER — LEVOTHYROXINE SODIUM 100 UG/1
100 TABLET ORAL DAILY
Qty: 90 TABLET | Refills: 1 | Status: SHIPPED | OUTPATIENT
Start: 2019-11-11 | End: 2020-07-15

## 2019-11-18 ENCOUNTER — HOSPITAL ENCOUNTER (OUTPATIENT)
Dept: WOUND CARE | Facility: HOSPITAL | Age: 54
Discharge: HOME | End: 2019-11-18
Attending: EMERGENCY MEDICINE
Payer: MEDICARE

## 2019-11-18 VITALS
SYSTOLIC BLOOD PRESSURE: 160 MMHG | WEIGHT: 291 LBS | BODY MASS INDEX: 41.66 KG/M2 | DIASTOLIC BLOOD PRESSURE: 89 MMHG | TEMPERATURE: 98 F | HEIGHT: 70 IN | HEART RATE: 80 BPM

## 2019-11-18 DIAGNOSIS — I83.029 VENOUS ULCER OF LEFT LEG: Primary | ICD-10-CM

## 2019-11-18 DIAGNOSIS — E66.01 MORBID OBESITY WITH BMI OF 40.0-44.9, ADULT: ICD-10-CM

## 2019-11-18 DIAGNOSIS — I87.2 VENOUS STASIS DERMATITIS OF LEFT LOWER EXTREMITY: ICD-10-CM

## 2019-11-18 DIAGNOSIS — L97.929 VENOUS ULCER OF LEFT LEG: Primary | ICD-10-CM

## 2019-11-18 PROCEDURE — 99213 OFFICE O/P EST LOW 20 MIN: CPT

## 2019-11-18 RX ORDER — CIPROFLOXACIN 500 MG/1
500 TABLET ORAL
COMMUNITY
End: 2019-12-24 | Stop reason: ALTCHOICE

## 2019-11-18 NOTE — PROGRESS NOTES
Subjective:       Patient ID: Danika Voss is a 54 y.o. female.    Chief Complaint: Non-healing Wound    10/11/19: Re-admit for venous stasis dermatitis to L lateral lower leg. Patient reports that the area began to swell with redness and clear weeping and burning sensation that is constant. She also states that she has been on antibiotics 3 times within the last 3 months, she thinks it was cipro. She has a scheduled appointment with the pain clinic on 10/16/19. Pulses palpable and present with doppler DP/PT. PO abx started today, patient verbalized understanding and agreed.     10/18/19: F/u with Dr. Ibrahim. Wound has decreased in size since last clinic visit. Continue po antibiotics and current wound care treatment.  Pt. Reports < wound site pain than on prev. Exam.    11/18/19: Patient returning to wound care clinic for same wound to LLE. Patient states that she has been in [place] the past month due to a death in the family. Patient also reports falling on Friday and has a large bruise to right leg. Patient c/o mild pain to right leg. Dr. Ibrahim assessed and recommends patient rest and elevate lower legs as much as possible for the next 2 days. Wound to LLE also causing some pain, pain 6/10. Periwound erythematous, tender, and warm to touch. Po cipro 500 mg bid prescribed. Patient instructed to begin as soon as possible. Continue with current wound care orders and patient to follow up in wound clinic in 2 weeks.  Interval HX & PAIN AS NOTED.  Review of Systems    Objective:    Wound as noted.  Physical Exam    Assessment:       1. Venous ulcer of left leg    2. Venous stasis dermatitis of left lower extremity    3. Morbid obesity with BMI of 40.0-44.9, adult           Wound 10/11/19 1035 Venous Ulcer lateral Leg #1 (Active)   10/11/19 1035    Pre-existing:    Primary Wound Type: Venous ulcer   Side: Left   Orientation: lateral   Location: Leg   Wound/PI Number (optional): #1   Ankle-Brachial Index:    Pulses:  palpable DP/PT   Removal Indication and Assessment:    Wound Outcome:    (Retired) Wound Type:    (Retired) Wound Length (cm):    (Retired) Wound Width (cm):    (Retired) Depth (cm):    Wound Description (Comments):    Removal Indications:    Dressing Appearance Open to air 11/18/2019  2:00 PM   Drainage Amount Small 11/18/2019  2:00 PM   Drainage Characteristics/Odor Serous 11/18/2019  2:00 PM   Appearance Pink;Red;Dry;Epithelialization 11/18/2019  2:00 PM   Tissue loss description Partial thickness 11/18/2019  2:00 PM   Red (%), Wound Tissue Color 100 % 11/18/2019  2:00 PM   Periwound Area Redness;Edematous;Satellite lesion 11/18/2019  2:00 PM   Wound Edges Irregular 11/18/2019  2:00 PM   Wound Length (cm) 8.3 cm 11/18/2019  2:00 PM   Wound Width (cm) 9.5 cm 11/18/2019  2:00 PM   Wound Depth (cm) 0.1 cm 11/18/2019  2:00 PM   Wound Volume (cm^3) 7.88 cm^3 11/18/2019  2:00 PM   Wound Surface Area (cm^2) 78.85 cm^2 11/18/2019  2:00 PM   Care Cleansed with:;Sterile normal saline 11/18/2019  2:00 PM   Dressing Applied;Other (see comments);Island/border;Tubular bandage 11/18/2019  2:00 PM   Periwound Care Skin barrier film applied 11/18/2019  2:00 PM   Compression Tubular elasticized bandage 11/18/2019  2:00 PM   Dressing Change Due 11/20/19 11/18/2019  2:00 PM       Venous Ulcer L lateral leg #1    Cleanse wound with: NS  Lidocaine: PRN debridement/discomfort in clinic  Periwound care: Cavilon periwound  Primary dressing: Betamethasone valerate 0.1% cream to affected area  Secondary dressing: Aquacel foam border to cover entire area.   Edema control: Tubigrip F toe to knee  Frequency: every other day per patient    Other orders: Apply betamethasone cream to irritation and dryness bilateral hands  Rx given to patient for po cipro 500 mg bid, betamethasone valerate cream 0.1%, and po percocet 7.5/325 mg q6h (28)      Plan:                Follow up in about 2 weeks (around 12/2/2019) for Dr. Ibrahim.

## 2019-11-30 DIAGNOSIS — G47.09 OTHER INSOMNIA: ICD-10-CM

## 2019-11-30 DIAGNOSIS — J31.0 CHRONIC RHINITIS: ICD-10-CM

## 2019-12-02 RX ORDER — TRAZODONE HYDROCHLORIDE 50 MG/1
50 TABLET ORAL NIGHTLY PRN
Qty: 30 TABLET | Refills: 2 | Status: SHIPPED | OUTPATIENT
Start: 2019-12-02 | End: 2020-02-17

## 2019-12-02 RX ORDER — MONTELUKAST SODIUM 10 MG/1
TABLET ORAL
Qty: 30 TABLET | Refills: 2 | Status: SHIPPED | OUTPATIENT
Start: 2019-12-02 | End: 2020-02-18

## 2019-12-16 ENCOUNTER — OFFICE VISIT (OUTPATIENT)
Dept: PODIATRY | Facility: CLINIC | Age: 54
End: 2019-12-16
Payer: MEDICARE

## 2019-12-16 VITALS
HEART RATE: 75 BPM | HEIGHT: 70 IN | BODY MASS INDEX: 41.95 KG/M2 | WEIGHT: 293 LBS | SYSTOLIC BLOOD PRESSURE: 135 MMHG | DIASTOLIC BLOOD PRESSURE: 83 MMHG

## 2019-12-16 DIAGNOSIS — S82.65XA CLOSED NONDISPLACED FRACTURE OF LATERAL MALLEOLUS OF LEFT FIBULA, INITIAL ENCOUNTER: ICD-10-CM

## 2019-12-16 DIAGNOSIS — S93.402A SPRAIN OF LEFT ANKLE, UNSPECIFIED LIGAMENT, INITIAL ENCOUNTER: Primary | ICD-10-CM

## 2019-12-16 PROCEDURE — 99215 OFFICE O/P EST HI 40 MIN: CPT | Mod: PBBFAC,PN | Performed by: PODIATRIST

## 2019-12-16 PROCEDURE — 99204 OFFICE O/P NEW MOD 45 MIN: CPT | Mod: S$PBB,,, | Performed by: PODIATRIST

## 2019-12-16 PROCEDURE — 99204 PR OFFICE/OUTPT VISIT, NEW, LEVL IV, 45-59 MIN: ICD-10-PCS | Mod: S$PBB,,, | Performed by: PODIATRIST

## 2019-12-16 PROCEDURE — 99999 PR PBB SHADOW E&M-EST. PATIENT-LVL V: ICD-10-PCS | Mod: PBBFAC,,, | Performed by: PODIATRIST

## 2019-12-16 PROCEDURE — 99999 PR PBB SHADOW E&M-EST. PATIENT-LVL V: CPT | Mod: PBBFAC,,, | Performed by: PODIATRIST
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
## 2019-12-23 ENCOUNTER — TELEPHONE (OUTPATIENT)
Dept: INTERNAL MEDICINE | Facility: CLINIC | Age: 54
End: 2019-12-23

## 2019-12-23 NOTE — TELEPHONE ENCOUNTER
----- Message from Rosario Lewis, DO sent at 12/23/2019  7:40 AM CST -----  Please call pt. Labs ordered last visit to have done prior to follow-up. Labs do not appear to have done. Please remind her to have done
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
## 2019-12-24 ENCOUNTER — OFFICE VISIT (OUTPATIENT)
Dept: FAMILY MEDICINE | Facility: CLINIC | Age: 54
End: 2019-12-24
Payer: MEDICARE

## 2019-12-24 ENCOUNTER — TELEPHONE (OUTPATIENT)
Dept: TRANSPLANT | Facility: CLINIC | Age: 54
End: 2019-12-24

## 2019-12-24 VITALS
DIASTOLIC BLOOD PRESSURE: 86 MMHG | SYSTOLIC BLOOD PRESSURE: 134 MMHG | BODY MASS INDEX: 41.95 KG/M2 | WEIGHT: 293 LBS | TEMPERATURE: 98 F | HEIGHT: 70 IN | HEART RATE: 66 BPM | OXYGEN SATURATION: 96 %

## 2019-12-24 DIAGNOSIS — F33.1 MODERATE EPISODE OF RECURRENT MAJOR DEPRESSIVE DISORDER: ICD-10-CM

## 2019-12-24 DIAGNOSIS — F41.1 GENERALIZED ANXIETY DISORDER: ICD-10-CM

## 2019-12-24 DIAGNOSIS — K74.60 CIRRHOSIS OF LIVER WITHOUT ASCITES, UNSPECIFIED HEPATIC CIRRHOSIS TYPE: ICD-10-CM

## 2019-12-24 DIAGNOSIS — I10 ESSENTIAL HYPERTENSION: Primary | Chronic | ICD-10-CM

## 2019-12-24 DIAGNOSIS — G89.4 CHRONIC PAIN DISORDER: Chronic | ICD-10-CM

## 2019-12-24 DIAGNOSIS — B18.2 CHRONIC HEPATITIS C WITHOUT HEPATIC COMA: Chronic | ICD-10-CM

## 2019-12-24 DIAGNOSIS — R79.89 ELEVATED LFTS: ICD-10-CM

## 2019-12-24 DIAGNOSIS — E03.4 HYPOTHYROIDISM DUE TO ACQUIRED ATROPHY OF THYROID: Chronic | ICD-10-CM

## 2019-12-24 PROCEDURE — 99215 OFFICE O/P EST HI 40 MIN: CPT | Mod: PBBFAC,PN | Performed by: FAMILY MEDICINE

## 2019-12-24 PROCEDURE — 99999 PR PBB SHADOW E&M-EST. PATIENT-LVL V: ICD-10-PCS | Mod: PBBFAC,,, | Performed by: FAMILY MEDICINE

## 2019-12-24 PROCEDURE — 99999 PR PBB SHADOW E&M-EST. PATIENT-LVL V: CPT | Mod: PBBFAC,,, | Performed by: FAMILY MEDICINE

## 2019-12-24 PROCEDURE — 99214 OFFICE O/P EST MOD 30 MIN: CPT | Mod: S$PBB,,, | Performed by: FAMILY MEDICINE

## 2019-12-24 PROCEDURE — 99214 PR OFFICE/OUTPT VISIT, EST, LEVL IV, 30-39 MIN: ICD-10-PCS | Mod: S$PBB,,, | Performed by: FAMILY MEDICINE

## 2019-12-24 RX ORDER — GABAPENTIN 600 MG/1
600 TABLET ORAL 4 TIMES DAILY
Qty: 120 TABLET | Refills: 5 | Status: SHIPPED | OUTPATIENT
Start: 2019-12-24 | End: 2020-05-01

## 2019-12-24 RX ORDER — BUPRENORPHINE HYDROCHLORIDE 150 UG/1
FILM, SOLUBLE BUCCAL
COMMUNITY
Start: 2019-12-18 | End: 2020-03-23

## 2019-12-24 RX ORDER — GABAPENTIN 600 MG/1
600 TABLET ORAL 4 TIMES DAILY
COMMUNITY
End: 2019-12-24 | Stop reason: SDUPTHER

## 2019-12-24 NOTE — TELEPHONE ENCOUNTER
----- Message from Tiff Carreno sent at 12/24/2019  8:36 AM CST -----    Sent pt chart to ref nurse for review.      ----- Message -----  From: Jacqueline Davila  Sent: 12/24/2019   8:22 AM CST  To: [facility] Liver Referral Pool    Patient is being referred by Dr. Lewis for Cirrhosis of liver without ascites. If possible patient would like to see Dr. Flower in [place]. Please call patient to schedule appt at [phone].

## 2019-12-24 NOTE — ASSESSMENT & PLAN NOTE
- denies thoughts of selfharm  - has been unable to find a psychiatrist or counselor   - continue current meds

## 2019-12-24 NOTE — ASSESSMENT & PLAN NOTE
Lab Results   Component Value Date    TSH 0.543 06/27/2019     - well controlled  - continue current medications

## 2019-12-24 NOTE — ASSESSMENT & PLAN NOTE
- she reports that she had seen Dr. Carrera and would like a provider in [facility]  - no prior treatment  - LFT worsening  - AFP normal  - referral to Hepatology

## 2019-12-26 ENCOUNTER — DOCUMENTATION ONLY (OUTPATIENT)
Dept: TRANSPLANT | Facility: CLINIC | Age: 54
End: 2019-12-26

## 2019-12-26 NOTE — NURSING
Pt records reviewed.   Pt will be referred to Hepatitis C clinic  Cirrhosis of liver without ascites, unspecified hepatic cirrhosis type  Chronic hepatitis C without hepatic coma  Elevated LFTs  Initial referral received  from the [facility].   Referring Provider/diagnosis  Rosario Lewis DO

## 2019-12-26 NOTE — LETTER
December 26, 2019    Danika Voss  [address] [place] [address]      Dear Danika Voss:    Your doctor has referred you to the [facility]. We are sending this letter to remind you to make an appointment with us to complete the referral process.     Please call us at [phone] to schedule an appointment. We look forward to seeing you soon.     If you received a call and have been scheduled, please disregard this letter.       Sincerely,        [facility]   [address] [place] [id number]  [phone]

## 2019-12-27 ENCOUNTER — HOSPITAL ENCOUNTER (OUTPATIENT)
Dept: WOUND CARE | Facility: HOSPITAL | Age: 54
Discharge: HOME | End: 2019-12-27
Attending: EMERGENCY MEDICINE
Payer: MEDICARE

## 2019-12-27 VITALS
DIASTOLIC BLOOD PRESSURE: 90 MMHG | HEART RATE: 87 BPM | SYSTOLIC BLOOD PRESSURE: 178 MMHG | BODY MASS INDEX: 41.95 KG/M2 | HEIGHT: 70 IN | WEIGHT: 293 LBS | TEMPERATURE: 98 F

## 2019-12-27 DIAGNOSIS — I87.2 VENOUS STASIS DERMATITIS OF LEFT LOWER EXTREMITY: Primary | ICD-10-CM

## 2019-12-27 PROCEDURE — 87186 SC STD MICRODIL/AGAR DIL: CPT

## 2019-12-27 PROCEDURE — 87075 CULTR BACTERIA EXCEPT BLOOD: CPT

## 2019-12-27 PROCEDURE — 29581 APPL MULTLAYER CMPRN SYS LEG: CPT

## 2019-12-27 PROCEDURE — 87070 CULTURE OTHR SPECIMN AEROBIC: CPT

## 2019-12-27 PROCEDURE — 87077 CULTURE AEROBIC IDENTIFY: CPT

## 2019-12-27 RX ORDER — HYDROMORPHONE HYDROCHLORIDE 2 MG/1
2 TABLET ORAL EVERY 6 HOURS PRN
COMMUNITY
End: 2020-03-23

## 2019-12-27 NOTE — PROGRESS NOTES
"Subjective:       Patient ID: Danika Voss is a 54 y.o. female.    Chief Complaint: Pressure Ulcer    10/11/19: Re-admit for venous stasis dermatitis to L lateral lower leg. Patient reports that the area began to swell with redness and clear weeping and burning sensation that is constant. She also states that she has been on antibiotics 3 times within the last 3 months, she thinks it was cipro. She has a scheduled appointment with the pain clinic on 10/16/19. Pulses palpable and present with doppler DP/PT. PO abx started today, patient verbalized understanding and agreed.     10/18/19: F/u with Dr. Ibrahim. Wound has decreased in size since last clinic visit. Continue po antibiotics and current wound care treatment.  Pt. Reports < wound site pain than on prev. Exam.    11/18/19: Patient returning to wound care clinic for same wound to LLE. Patient states that she has been in [place] the past month due to a death in the family. Patient also reports falling on Friday and has a large bruise to right leg. Patient c/o mild pain to right leg. Dr. Ibrahim assessed and recommends patient rest and elevate lower legs as much as possible for the next 2 days. Wound to LLE also causing some pain, pain 6/10. Periwound erythematous, tender, and warm to touch. Po cipro 500 mg bid prescribed. Patient instructed to begin as soon as possible. Continue with current wound care orders and patient to follow up in wound clinic in 2 weeks.    12/27/19: Patient Fu today with . States she fell last Wednesday 11/18/19 at home on an ipad cover and went  To the  ER in  [facility].  They told patient she had  Sprained her ankle and referred her to podiatry  who she followed up with on 12/20/19 and was placed in a walking boot. Patient states she feels " funny and the boot is hard to walk in and does not wear it."  Patient reports she is not wearing her tubi  either because she was" told to only wear it about 3 hours a day."  LLE " "large area of redness with multiple tiny spots draining clear yellow fluid, whole red area measuring 13.9x 13 x 0.1.  Culture obtained today in clinic. Order per  to apply Aquacel Extra Ag to any open area,  3 layer compression toe to knee LLE, Tubi  E to RLE. Patient requesting Dilaudid Po for pain.  She states she has a pain mgt Rx for Oxycodone 5mg she can not fill until 1/3/20 and " it does nothing for me,   And the percocet don't do much either.I stopped going to pain management because that's all they will give me. "  RX given to patient for Dilaudid 2 mg po  Q6 hrs prn pain.  Patient to follow up in 1 wk in clinic.   Pt. C/o wound site pain refractory to meds  As above.  Review of Systems    Objective:    Wounds as noted w/o Marilu's sign, but oneal-wound remains tender & erythematous w/ ? Hyperemia.  Physical Exam    Assessment:       1. Venous stasis dermatitis of left lower extremity           Wound 10/11/19 1035 Venous Ulcer lateral Leg #1 (Active)   10/11/19 1035    Pre-existing:    Primary Wound Type: Venous ulcer   Side: Left   Orientation: lateral   Location: Leg   Wound/PI Number (optional): #1   Ankle-Brachial Index:    Pulses: palpable DP/PT   Removal Indication and Assessment:    Wound Outcome:    (Retired) Wound Type:    (Retired) Wound Length (cm):    (Retired) Wound Width (cm):    (Retired) Depth (cm):    Wound Description (Comments):    Removal Indications:    Wound Image    12/27/2019 10:24 AM   Dressing Appearance Open to air 12/27/2019 10:24 AM   Drainage Amount Scant 12/27/2019 10:24 AM   Drainage Characteristics/Odor Clear;Yellow 12/27/2019 10:24 AM   Appearance Red 12/27/2019 10:24 AM   Red (%), Wound Tissue Color 100 % 12/27/2019 10:24 AM   Periwound Area Satellite lesion;Edematous;Excoriated;Redness 12/27/2019 10:24 AM   Wound Edges Irregular 12/27/2019 10:24 AM   Wound Length (cm) 13.9 cm 12/27/2019 10:24 AM   Wound Width (cm) 13 cm 12/27/2019 10:24 AM   Wound Depth (cm) " 0.1 cm 12/27/2019 10:24 AM   Wound Volume (cm^3) 18.07 cm^3 12/27/2019 10:24 AM   Wound Surface Area (cm^2) 180.7 cm^2 12/27/2019 10:24 AM   Care Cleansed with:;Sterile normal saline 12/27/2019 10:24 AM   Dressing Changed;Applied;Silver;Calcium alginate;Compression wrap 12/27/2019 10:24 AM   Periwound Care Absorptive dressing applied;Moisture barrier applied;Topical treatment applied;Other (see comments) 12/27/2019 10:24 AM   Compression Three layer compression 12/27/2019 10:24 AM   Dressing Change Due 01/03/20 12/27/2019 10:24 AM       Venous Ulcer L lateral leg #1  Cleanse wound with: NS  Lidocaine: PRN debridement/discomfort in clinic  Periwound care: Calmoseptine and betamethasone  Primary dressing: Aquacel Extra AG to any open areas  Secondary dressing: 3 layer compression toe to knee  Edema control: Tubigrip E RLE toe to knee, 3 layer compression LLE toe to knee  Frequency: Weekly in clinic    Other orders:   Rx given to patient for po Dilaudid 2 mg q6h prn pain  Culture of wound taken today in clinic 12/27/19    Follow-up: 1 week with Dr. Ibrahim Friday 1/3/20    Plan:                1/3/20 Dr. Ibrahim

## 2019-12-30 LAB — BACTERIA SPEC AEROBE CULT: ABNORMAL

## 2019-12-31 ENCOUNTER — TELEPHONE (OUTPATIENT)
Dept: HEPATOLOGY | Facility: CLINIC | Age: 54
End: 2019-12-31

## 2019-12-31 LAB — BACTERIA SPEC ANAEROBE CULT: NORMAL

## 2020-01-13 ENCOUNTER — HOSPITAL ENCOUNTER (OUTPATIENT)
Dept: RADIOLOGY | Facility: HOSPITAL | Age: 55
Discharge: HOME | End: 2020-01-13
Attending: EMERGENCY MEDICINE
Payer: MEDICARE

## 2020-01-13 ENCOUNTER — HOSPITAL ENCOUNTER (OUTPATIENT)
Dept: WOUND CARE | Facility: HOSPITAL | Age: 55
Discharge: HOME | End: 2020-01-13
Attending: EMERGENCY MEDICINE
Payer: MEDICARE

## 2020-01-13 VITALS
HEIGHT: 70 IN | BODY MASS INDEX: 41.95 KG/M2 | SYSTOLIC BLOOD PRESSURE: 157 MMHG | WEIGHT: 293 LBS | HEART RATE: 76 BPM | TEMPERATURE: 96 F | DIASTOLIC BLOOD PRESSURE: 90 MMHG

## 2020-01-13 DIAGNOSIS — I82.492 ACUTE EMBOLISM AND THROMBOSIS OF OTHER SPECIFIED DEEP VEIN OF LEFT LOWER EXTREMITY: ICD-10-CM

## 2020-01-13 DIAGNOSIS — I83.029 VENOUS ULCER OF LEFT LEG: ICD-10-CM

## 2020-01-13 DIAGNOSIS — I87.2 VENOUS STASIS DERMATITIS OF LEFT LOWER EXTREMITY: ICD-10-CM

## 2020-01-13 DIAGNOSIS — L97.929 VENOUS ULCER OF LEFT LEG: Primary | ICD-10-CM

## 2020-01-13 DIAGNOSIS — L97.929 VENOUS ULCER OF LEFT LEG: ICD-10-CM

## 2020-01-13 DIAGNOSIS — I83.029 VENOUS ULCER OF LEFT LEG: Primary | ICD-10-CM

## 2020-01-13 PROCEDURE — 93971 EXTREMITY STUDY: CPT | Mod: 26,LT,, | Performed by: RADIOLOGY

## 2020-01-13 PROCEDURE — 29581 APPL MULTLAYER CMPRN SYS LEG: CPT

## 2020-01-13 PROCEDURE — 93971 EXTREMITY STUDY: CPT | Mod: TC,LT

## 2020-01-13 PROCEDURE — 93971 US LOWER EXTREMITY VEINS LEFT: ICD-10-PCS | Mod: 26,LT,, | Performed by: RADIOLOGY

## 2020-01-13 RX ORDER — DOXYCYCLINE 100 MG/1
100 CAPSULE ORAL 2 TIMES DAILY
COMMUNITY
Start: 2020-01-13 | End: 2020-03-23

## 2020-01-13 RX ORDER — HYDROCODONE BITARTRATE AND ACETAMINOPHEN 10; 325 MG/1; MG/1
10-325 TABLET ORAL EVERY 6 HOURS PRN
COMMUNITY
End: 2020-03-23

## 2020-01-13 NOTE — PROGRESS NOTES
"Subjective:       Patient ID: Danika Voss is a 54 y.o. female.    Chief Complaint: Venous Stasis (dermetitis of left lower leg)    10/11/19: Re-admit for venous stasis dermatitis to L lateral lower leg. Patient reports that the area began to swell with redness and clear weeping and burning sensation that is constant. She also states that she has been on antibiotics 3 times within the last 3 months, she thinks it was cipro. She has a scheduled appointment with the pain clinic on 10/16/19. Pulses palpable and present with doppler DP/PT. PO abx started today, patient verbalized understanding and agreed.     10/18/19: F/u with Dr. Ibrahim. Wound has decreased in size since last clinic visit. Continue po antibiotics and current wound care treatment.  Pt. Reports < wound site pain than on prev. Exam.    11/18/19: Patient returning to wound care clinic for same wound to LLE. Patient states that she has been in [place] the past month due to a death in the family. Patient also reports falling on Friday and has a large bruise to right leg. Patient c/o mild pain to right leg. Dr. Ibrahim assessed and recommends patient rest and elevate lower legs as much as possible for the next 2 days. Wound to LLE also causing some pain, pain 6/10. Periwound erythematous, tender, and warm to touch. Po cipro 500 mg bid prescribed. Patient instructed to begin as soon as possible. Continue with current wound care orders and patient to follow up in wound clinic in 2 weeks.    12/27/19: Patient Fu today with . States she fell last Wednesday 11/18/19 at home on an ipad cover and went  To the  ER in  [facility].  They told patient she had  Sprained her ankle and referred her to podiatry  who she followed up with on 12/20/19 and was placed in a walking boot. Patient states she feels " funny and the boot is hard to walk in and does not wear it."  Patient reports she is not wearing her tubi  either because she was" told to only wear " "it about 3 hours a day."  LLE large area of redness with multiple tiny spots draining clear yellow fluid, whole red area measuring 13.9x 13 x 0.1.  Culture obtained today in clinic. Order per  to apply Aquacel Extra Ag to any open area,  3 layer compression toe to knee LLE, Tubi  E to RLE. Patient requesting Dilaudid Po for pain.  She states she has a pain mgt Rx for Oxycodone 5mg she can not fill until 1/3/20 and " it does nothing for me,   And the percocet don't do much either.I stopped going to pain management because that's all they will give me. "  RX given to patient for Dilaudid 2 mg po  Q6 hrs prn pain.  Patient to follow up in 1 wk in clinic.   01/13/2020 Wound Clinic visit today to follow up on wound with venous dermatitis left lower leg. Left lower leg with edema, obviously larger than right lower leg. Dr. Ibrahim ordered stat Ultrasound left lower leg. Result was negative for DVT. Patient was notified of Ultrasound result by Dr. Ibrahim. Elevation of legs and feet when resting encouraged. Consumption of protein foods at least twice a day encouraged for growth of tissue to close wound..  Hx as above w/ > LLE pain than on prev. Exam.  LLE skin lesions sl. More numerous than last exam w/ mod. L calf tenderness, mild hyperemia, & + Marilu's sign.  LLE u/s today neg. For DVT.  Review of Systems    Objective:      Physical Exam    Assessment:       1. Venous ulcer of left leg    2. Acute embolism and thrombosis of other specified deep vein of left lower extremity     3. Venous stasis dermatitis of left lower extremity           Wound 10/11/19 1035 Venous Ulcer lateral Leg #1 (Active)   10/11/19 1035    Pre-existing:    Primary Wound Type: Venous ulcer   Side: Left   Orientation: lateral   Location: Leg   Wound/PI Number (optional): #1   Ankle-Brachial Index:    Pulses: palpable DP/PT   Removal Indication and Assessment:    Wound Outcome:    (Retired) Wound Type:    (Retired) Wound Length (cm): "    (Retired) Wound Width (cm):    (Retired) Depth (cm):    Wound Description (Comments):    Removal Indications:    Wound Image   1/13/2020  9:00 AM   Dressing Appearance Open to air 1/13/2020  9:00 AM   Drainage Amount None 1/13/2020  9:00 AM   Appearance Red;Dry;Other (see comments) 1/13/2020  9:00 AM   Tissue loss description Partial thickness 1/13/2020  9:00 AM   Black (%), Wound Tissue Color 0 % 1/13/2020  9:00 AM   Red (%), Wound Tissue Color 100 % 1/13/2020  9:00 AM   Yellow (%), Wound Tissue Color 0 % 1/13/2020  9:00 AM   Periwound Area Dry;Redness 1/13/2020  9:00 AM   Wound Edges Irregular 1/13/2020  9:00 AM   Wound Length (cm) 14.5 cm 1/13/2020  9:00 AM   Wound Width (cm) 9 cm 1/13/2020  9:00 AM   Wound Depth (cm) 0.1 cm 1/13/2020  9:00 AM   Wound Volume (cm^3) 13.05 cm^3 1/13/2020  9:00 AM   Wound Surface Area (cm^2) 130.5 cm^2 1/13/2020  9:00 AM   Care Cleansed with:;Sterile normal saline 1/13/2020  9:00 AM   Dressing Applied 1/13/2020  9:00 AM   Periwound Care Skin barrier film applied 1/13/2020  9:00 AM   Compression Tubular elasticized bandage;Three layer compression 1/13/2020  9:00 AM   Off Loading Off loading shoe 1/13/2020  9:00 AM   Dressing Change Due 01/20/20 1/13/2020  9:00 AM         Venous stasis dermatitis of left lower extremity       Comments     Venous stasis dermatitis of left lower extremity  - Primary    Venous Ulcer L lateral leg #1  Cleanse wound with: NS  Lidocaine: PRN debridement/discomfort in clinic  Periwound care: Calmoseptine and betamethasone  Primary dressing: Aquacel Extra AG to any open areas  Secondary dressing: 3 layer compression toe to knee left leg, flexi net. Keep dressing left leg intact.  Edema control: Tubigrip E RLE toe to knee. 3 layer compression LLE toe to knee. Elevate legs and feet when resting keep edema down.  Frequency: Weekly in clinic    Other orders: Repeat Ultrasound left lower leg stat today. Done, result reviewed by Dr. Ibrahim. Dr. Ibrahim  discussed negative ultrasound with patient.  01/13/2020 Rx given to patient for po Norco 10/325 1 tab Q 6hr prn pain, 28 tabs. Doxycycline 100mg 1 cap bid until entirely gone; 20 caps.  Patient went for Ultrasound with tubigrip size F on left leg. When Ultrasound report known tubi  F on left leg removed. 3 layer profore applied to left leg with aquacell ag on wounds.Tubi  size E on right leg.  Follow-up: 1 week with Dr. Ibrahim Friday 1/20/20         Plan:       Follow-up: 1 week with Dr. Ibrahim Friday 1/20/20

## 2020-01-17 ENCOUNTER — TELEPHONE (OUTPATIENT)
Dept: FAMILY MEDICINE | Facility: CLINIC | Age: 55
End: 2020-01-17

## 2020-01-17 NOTE — TELEPHONE ENCOUNTER
Notified patient gabapentin cannot be increased due to her liver function. She states she is in a lot of pain. I advised patient to contact her wound care provider who I also see wrote her a script for Norco 10/325mg #28 Q6hr and she says she will run out of that today. She would like a call from Dr. Lewis. I advised patient what she should make an appointment to see you. Please advise.

## 2020-01-17 NOTE — TELEPHONE ENCOUNTER
----- Message from Kobe Urena sent at 1/17/2020  9:22 AM CST -----  Contact: Pt   Pt called and has been taking 6 gabapentin (NEURONTIN) 600 MG tablet [[id number]]    instead of 4 because she has an infection in her leg.    Pt needs a refill and would like to know if you would refill it so that she can take 6 a day    Pt can be reached at [phone]

## 2020-01-17 NOTE — TELEPHONE ENCOUNTER
Please notify patient unable to increase Gabapentin due to poor liver function.   Dr. Rosario Lewis D.O.   [facility]

## 2020-01-20 ENCOUNTER — HOSPITAL ENCOUNTER (OUTPATIENT)
Dept: WOUND CARE | Facility: HOSPITAL | Age: 55
Discharge: HOME | End: 2020-01-20
Attending: EMERGENCY MEDICINE
Payer: MEDICARE

## 2020-01-20 VITALS
TEMPERATURE: 97 F | DIASTOLIC BLOOD PRESSURE: 87 MMHG | HEIGHT: 70 IN | WEIGHT: 293 LBS | BODY MASS INDEX: 41.95 KG/M2 | HEART RATE: 86 BPM | SYSTOLIC BLOOD PRESSURE: 156 MMHG

## 2020-01-20 DIAGNOSIS — I87.2 VENOUS STASIS DERMATITIS OF LEFT LOWER EXTREMITY: Primary | ICD-10-CM

## 2020-01-20 PROCEDURE — 99213 OFFICE O/P EST LOW 20 MIN: CPT

## 2020-01-20 NOTE — PROGRESS NOTES
"Subjective:       Patient ID: Danika Voss is a 54 y.o. female.    Chief Complaint: Venous Stasis (left leg)    10/11/19: Re-admit for venous stasis dermatitis to L lateral lower leg. Patient reports that the area began to swell with redness and clear weeping and burning sensation that is constant. She also states that she has been on antibiotics 3 times within the last 3 months, she thinks it was cipro. She has a scheduled appointment with the pain clinic on 10/16/19. Pulses palpable and present with doppler DP/PT. PO abx started today, patient verbalized understanding and agreed.     10/18/19: F/u with Dr. Ibrahim. Wound has decreased in size since last clinic visit. Continue po antibiotics and current wound care treatment.  Pt. Reports < wound site pain than on prev. Exam.    11/18/19: Patient returning to wound care clinic for same wound to LLE. Patient states that she has been in [place] the past month due to a death in the family. Patient also reports falling on Friday and has a large bruise to right leg. Patient c/o mild pain to right leg. Dr. Ibrahim assessed and recommends patient rest and elevate lower legs as much as possible for the next 2 days. Wound to LLE also causing some pain, pain 6/10. Periwound erythematous, tender, and warm to touch. Po cipro 500 mg bid prescribed. Patient instructed to begin as soon as possible. Continue with current wound care orders and patient to follow up in wound clinic in 2 weeks.    12/27/19: Patient Fu today with . States she fell last Wednesday 11/18/19 at home on an ipad cover and went  To the  ER in  [facility].  They told patient she had  Sprained her ankle and referred her to podiatry  who she followed up with on 12/20/19 and was placed in a walking boot. Patient states she feels " funny and the boot is hard to walk in and does not wear it."  Patient reports she is not wearing her tubi  either because she was" told to only wear it about 3 hours a " "day."  LLE large area of redness with multiple tiny spots draining clear yellow fluid, whole red area measuring 13.9x 13 x 0.1.  Culture obtained today in clinic. Order per  to apply Aquacel Extra Ag to any open area,  3 layer compression toe to knee LLE, Tubi  E to RLE. Patient requesting Dilaudid Po for pain.  She states she has a pain mgt Rx for Oxycodone 5mg she can not fill until 1/3/20 and " it does nothing for me,   And the percocet don't do much either.I stopped going to pain management because that's all they will give me. "  RX given to patient for Dilaudid 2 mg po  Q6 hrs prn pain.  Patient to follow up in 1 wk in clinic.   01/13/2020 Wound Clinic visit today to follow up on wound with venous dermatitis left lower leg. Left lower leg with edema, obviously larger than right lower leg. Dr. Ibrahim ordered stat Ultrasound left lower leg. Result was negative for DVT. Patient was notified of Ultrasound result by Dr. Ibrahim. Elevation of legs and feet when resting encouraged. Consumption of protein foods at least twice a day encouraged for growth of tissue to close wound..  Hx as above w/ > LLE pain than on prev. Exam.  LLE skin lesions sl. More numerous than last exam w/ mod. L calf tenderness, mild hyperemia, & + Marilu's sign.  LLE u/s today neg. For DVT.  01/20/2020: F/u with Dr. Ibrahim. LLE measured for compression stockings. New wound care orders given. Patient to follow up in wound care clinic in 1 week  Pt. Reports << pain @ wound site.  Wounds nearly resolved.  Review of Systems    Objective:    No Marilu's sign or s/s o0f infection present.  Physical Exam    Assessment:       1. Venous stasis dermatitis of left lower extremity           Wound 10/11/19 1035 Venous Ulcer lateral Leg #1 (Active)   10/11/19 1035    Pre-existing:    Primary Wound Type: Venous ulcer   Side: Left   Orientation: lateral   Location: Leg   Wound/PI Number (optional): #1   Ankle-Brachial Index:    Pulses: " palpable DP/PT   Removal Indication and Assessment:    Wound Outcome:    (Retired) Wound Type:    (Retired) Wound Length (cm):    (Retired) Wound Width (cm):    (Retired) Depth (cm):    Wound Description (Comments):    Removal Indications:    Dressing Appearance Intact 1/20/2020  4:00 PM   Drainage Amount Scant 1/20/2020  4:00 PM   Drainage Characteristics/Odor Serous 1/20/2020  4:00 PM   Appearance Red 1/20/2020  4:00 PM   Tissue loss description Partial thickness 1/20/2020  4:00 PM   Red (%), Wound Tissue Color 100 % 1/20/2020  4:00 PM   Periwound Area Dry;Redness 1/20/2020  4:00 PM   Wound Edges Irregular 1/20/2020  4:00 PM   Wound Length (cm) 12 cm 1/20/2020  4:00 PM   Wound Width (cm) 9 cm 1/20/2020  4:00 PM   Wound Depth (cm) 0.1 cm 1/20/2020  4:00 PM   Wound Volume (cm^3) 10.8 cm^3 1/20/2020  4:00 PM   Wound Surface Area (cm^2) 108 cm^2 1/20/2020  4:00 PM   Care Cleansed with:;Sterile normal saline 1/20/2020  4:00 PM   Dressing Applied;Calcium alginate;Silver;Tubular bandage 1/20/2020  4:00 PM   Periwound Care Other (see comments) 1/20/2020  4:00 PM   Compression Tubular elasticized bandage 1/20/2020  4:00 PM   Off Loading Off loading shoe 1/20/2020  4:00 PM   Dressing Change Due 01/27/20 1/20/2020  4:00 PM       Venous stasis dermatitis of left lower extremity  - Primary    Venous Ulcer L lateral leg #1  Cleanse wound with: NS  Lidocaine: PRN debridement/discomfort in clinic  Periwound care: Calmoseptine and betamethasone  Primary dressing: Aquacel Extra AG to any open areas  Edema control: Tubigrip E x2 LLE toe to knee. Tubigrip Ex1 RLE toe to knee. Elevate legs and feet when resting keep edema down.  Frequency: Weekly in clinic    Other orders: Rx given for Norco 10/325 mg q6h prn pain          Plan:                Follow-up: 1 week with Dr. Ibrahim Friday 1/27/20

## 2020-01-21 DIAGNOSIS — K21.00 GASTROESOPHAGEAL REFLUX DISEASE WITH ESOPHAGITIS: Chronic | ICD-10-CM

## 2020-01-21 RX ORDER — PANTOPRAZOLE SODIUM 40 MG/1
TABLET, DELAYED RELEASE ORAL
Qty: 30 TABLET | Refills: 2 | Status: ON HOLD | OUTPATIENT
Start: 2020-01-21 | End: 2020-10-22 | Stop reason: HOSPADM

## 2020-02-03 ENCOUNTER — HOSPITAL ENCOUNTER (OUTPATIENT)
Dept: WOUND CARE | Facility: HOSPITAL | Age: 55
Discharge: HOME | End: 2020-02-03
Attending: EMERGENCY MEDICINE
Payer: MEDICARE

## 2020-02-03 VITALS
HEIGHT: 70 IN | WEIGHT: 293 LBS | TEMPERATURE: 99 F | HEART RATE: 86 BPM | DIASTOLIC BLOOD PRESSURE: 105 MMHG | BODY MASS INDEX: 41.95 KG/M2 | SYSTOLIC BLOOD PRESSURE: 194 MMHG

## 2020-02-03 DIAGNOSIS — I87.2 VENOUS STASIS DERMATITIS OF LEFT LOWER EXTREMITY: Primary | ICD-10-CM

## 2020-02-03 PROCEDURE — 99212 OFFICE O/P EST SF 10 MIN: CPT

## 2020-02-03 RX ORDER — OXYCODONE AND ACETAMINOPHEN 10; 325 MG/1; MG/1
1 TABLET ORAL EVERY 6 HOURS PRN
COMMUNITY
End: 2020-08-19 | Stop reason: ALTCHOICE

## 2020-02-03 NOTE — PROGRESS NOTES
"Subjective:       Patient ID: Danika Voss is a 54 y.o. female.    Chief Complaint: Non-healing Wound (left lower leg)    10/11/19: Re-admit for venous stasis dermatitis to L lateral lower leg. Patient reports that the area began to swell with redness and clear weeping and burning sensation that is constant. She also states that she has been on antibiotics 3 times within the last 3 months, she thinks it was cipro. She has a scheduled appointment with the pain clinic on 10/16/19. Pulses palpable and present with doppler DP/PT. PO abx started today, patient verbalized understanding and agreed.     10/18/19: F/u with Dr. Ibrahim. Wound has decreased in size since last clinic visit. Continue po antibiotics and current wound care treatment.  Pt. Reports < wound site pain than on prev. Exam.    11/18/19: Patient returning to wound care clinic for same wound to LLE. Patient states that she has been in [place] the past month due to a death in the family. Patient also reports falling on Friday and has a large bruise to right leg. Patient c/o mild pain to right leg. Dr. Ibrahim assessed and recommends patient rest and elevate lower legs as much as possible for the next 2 days. Wound to LLE also causing some pain, pain 6/10. Periwound erythematous, tender, and warm to touch. Po cipro 500 mg bid prescribed. Patient instructed to begin as soon as possible. Continue with current wound care orders and patient to follow up in wound clinic in 2 weeks.    12/27/19: Patient Fu today with . States she fell last Wednesday 11/18/19 at home on an ipad cover and went  To the  ER in  [facility].  They told patient she had  Sprained her ankle and referred her to podiatry  who she followed up with on 12/20/19 and was placed in a walking boot. Patient states she feels " funny and the boot is hard to walk in and does not wear it."  Patient reports she is not wearing her tubi  either because she was" told to only wear it about 3 " "hours a day."  LLE large area of redness with multiple tiny spots draining clear yellow fluid, whole red area measuring 13.9x 13 x 0.1.  Culture obtained today in clinic. Order per  to apply Aquacel Extra Ag to any open area,  3 layer compression toe to knee LLE, Tubi  E to RLE. Patient requesting Dilaudid Po for pain.  She states she has a pain mgt Rx for Oxycodone 5mg she can not fill until 1/3/20 and " it does nothing for me,   And the percocet don't do much either.I stopped going to pain management because that's all they will give me. "  RX given to patient for Dilaudid 2 mg po  Q6 hrs prn pain.  Patient to follow up in 1 wk in clinic.   01/13/2020 Wound Clinic visit today to follow up on wound with venous dermatitis left lower leg. Left lower leg with edema, obviously larger than right lower leg. Dr. Ibrahim ordered stat Ultrasound left lower leg. Result was negative for DVT. Patient was notified of Ultrasound result by Dr. Ibrahim. Elevation of legs and feet when resting encouraged. Consumption of protein foods at least twice a day encouraged for growth of tissue to close wound..  Hx as above w/ > LLE pain than on prev. Exam.  LLE skin lesions sl. More numerous than last exam w/ mod. L calf tenderness, mild hyperemia, & + Marilu's sign.  LLE u/s today neg. For DVT.  01/20/2020: F/u with Dr. Ibrahim. LLE measured for compression stockings. New wound care orders given. Patient to follow up in wound care clinic in 1 week.  02/03/2020 Wound clinic visit today to evaluate and treat wounds left lower leg. Wounds small and scattered left lower leg.  Pt. States that LLE pain remains controlled w/ current Rx.  Review of Systems    Objective:    Wounds smaller & fewer in # w/o Marilu's sign or s/s of infection.  Physical Exam    Assessment:       1. Venous stasis dermatitis of left lower extremity           Wound 10/11/19 1035 Venous Ulcer lateral Leg #1 (Active)   10/11/19 1035    Pre-existing:  "   Primary Wound Type: Venous ulcer   Side: Left   Orientation: lateral   Location: Leg   Wound/PI Number (optional): #1   Ankle-Brachial Index:    Pulses: palpable DP/PT   Removal Indication and Assessment:    Wound Outcome:    (Retired) Wound Type:    (Retired) Wound Length (cm):    (Retired) Wound Width (cm):    (Retired) Depth (cm):    Wound Description (Comments):    Removal Indications:    Wound Image   2/3/2020  1:55 PM   Dressing Appearance Open to air 2/3/2020  1:55 PM   Drainage Amount None 2/3/2020  1:55 PM   Appearance Pink;Red;Tan 2/3/2020  1:55 PM   Tissue loss description Partial thickness 2/3/2020  1:55 PM   Black (%), Wound Tissue Color 20 % 2/3/2020  1:55 PM   Red (%), Wound Tissue Color 80 % 2/3/2020  1:55 PM   Yellow (%), Wound Tissue Color 0 % 2/3/2020  1:55 PM   Periwound Area Intact 2/3/2020  1:55 PM   Wound Edges Irregular 2/3/2020  1:55 PM   Wound Length (cm) 10.8 cm 2/3/2020  1:55 PM   Wound Width (cm) 8 cm 2/3/2020  1:55 PM   Wound Depth (cm) 0.1 cm 2/3/2020  1:55 PM   Wound Volume (cm^3) 8.64 cm^3 2/3/2020  1:55 PM   Wound Surface Area (cm^2) 86.4 cm^2 2/3/2020  1:55 PM   Care Cleansed with:;Sterile normal saline;Applied: 2/3/2020  1:55 PM   Dressing Applied 2/3/2020  1:55 PM   Periwound Care Dry periwound area maintained 2/3/2020  1:55 PM   Compression Tubular elasticized bandage 2/3/2020  1:55 PM   Dressing Change Due 02/10/20 2/3/2020  1:55 PM   Scattered small wounds left lateral lower leg. Wounds dry  red pink and tan in color.  Venous stasis dermatitis of left lower extremity  - Primary    Venous Ulcer L lateral leg #1  Cleanse wound with: NS  Lidocaine: PRN debridement/discomfort in clinic  Periwound care: Calmoseptine and betamethasone  Primary dressing: Aquacel Extra AG to any open areas  Edema control: Tubigrip E x2 LLE toe to knee. Tubigrip Ex1 RLE toe to knee. Elevate legs and feet when resting keep edema down.  Frequency: Weekly in clinic    Other orders: Rx given for  Oxycodone 10/325 mg q6h prn pain; 28 ordered.    Follow-up: 1 week with Dr. Ibrahim Friday 02/10/2020  Plan:       Follow-up: 1 week with Dr. Ibrahim Friday 02/10/2020.  F/u w/ Pain Management as per appt.

## 2020-02-10 ENCOUNTER — HOSPITAL ENCOUNTER (OUTPATIENT)
Dept: WOUND CARE | Facility: HOSPITAL | Age: 55
Discharge: HOME | End: 2020-02-10
Attending: EMERGENCY MEDICINE
Payer: MEDICARE

## 2020-02-10 VITALS — HEART RATE: 106 BPM | TEMPERATURE: 98 F | SYSTOLIC BLOOD PRESSURE: 158 MMHG | DIASTOLIC BLOOD PRESSURE: 91 MMHG

## 2020-02-10 DIAGNOSIS — I87.2 VENOUS STASIS DERMATITIS OF LEFT LOWER EXTREMITY: Primary | ICD-10-CM

## 2020-02-10 PROCEDURE — 99213 OFFICE O/P EST LOW 20 MIN: CPT

## 2020-02-10 NOTE — PROGRESS NOTES
"Subjective:       Patient ID: Danika Voss is a 54 y.o. female.    Chief Complaint: Non-healing Wound    10/11/19: Re-admit for venous stasis dermatitis to L lateral lower leg. Patient reports that the area began to swell with redness and clear weeping and burning sensation that is constant. She also states that she has been on antibiotics 3 times within the last 3 months, she thinks it was cipro. She has a scheduled appointment with the pain clinic on 10/16/19. Pulses palpable and present with doppler DP/PT. PO abx started today, patient verbalized understanding and agreed.     10/18/19: F/u with Dr. Ibrahim. Wound has decreased in size since last clinic visit. Continue po antibiotics and current wound care treatment.  Pt. Reports < wound site pain than on prev. Exam.    11/18/19: Patient returning to wound care clinic for same wound to LLE. Patient states that she has been in [place] the past month due to a death in the family. Patient also reports falling on Friday and has a large bruise to right leg. Patient c/o mild pain to right leg. Dr. Ibrahim assessed and recommends patient rest and elevate lower legs as much as possible for the next 2 days. Wound to LLE also causing some pain, pain 6/10. Periwound erythematous, tender, and warm to touch. Po cipro 500 mg bid prescribed. Patient instructed to begin as soon as possible. Continue with current wound care orders and patient to follow up in wound clinic in 2 weeks.    12/27/19: Patient Fu today with . States she fell last Wednesday 11/18/19 at home on an ipad cover and went  To [facility]  [facility].  They told patient she had  Sprained her ankle and referred her to podiatry  who she followed up with on 12/20/19 and was placed in a walking boot. Patient states she feels " funny and the boot is hard to walk in and does not wear it."  Patient reports she is not wearing her tubi  either because she was" told to only wear it about 3 hours a day."  " "LLE large area of redness with multiple tiny spots draining clear yellow fluid, whole red area measuring 13.9x 13 x 0.1.  Culture obtained today in clinic. Order per  to apply Aquacel Extra Ag to any open area,  3 layer compression toe to knee LLE, Tubi  E to RLE. Patient requesting Dilaudid Po for pain.  She states she has a pain mgt Rx for Oxycodone 5mg she can not fill until 1/3/20 and " it does nothing for me,   And the percocet don't do much either.I stopped going to pain management because that's all they will give me. "  RX given to patient for Dilaudid 2 mg po  Q6 hrs prn pain.  Patient to follow up in 1 wk in clinic.   01/13/2020 Wound Clinic visit today to follow up on wound with venous dermatitis left lower leg. Left lower leg with edema, obviously larger than right lower leg. Dr. Ibrahim ordered stat Ultrasound left lower leg. Result was negative for DVT. Patient was notified of Ultrasound result by Dr. Ibrahim. Elevation of legs and feet when resting encouraged. Consumption of protein foods at least twice a day encouraged for growth of tissue to close wound..  Hx as above w/ > LLE pain than on prev. Exam.  LLE skin lesions sl. More numerous than last exam w/ mod. L calf tenderness, mild hyperemia, & + Marilu's sign.  LLE u/s today neg. For DVT.  01/20/2020: F/u with Dr. Ibrahim. LLE measured for compression stockings. New wound care orders given. Patient to follow up in wound care clinic in 1 week.  02/03/2020 Wound clinic visit today to evaluate and treat wounds left lower leg. Wounds small and scattered left lower leg.  Pt. States that LLE pain remains controlled w/ current Rx.  Review of Systems    Objective:   Wounds smaller & fewer in # w/o Marilu's sign or s/s of infection.  02/10/2020: F/u with Dr. Ibrahim. Patient states that she fell at home yesterday on gravel and is having increased pain to LLE. Rx given for oxycodone 10-325mg. Patient made aware that Dr. Ibrahim will no " longer write rx for pain medication after today's visit. Patient verbalized understanding. Patient also has an appt with pain management on Wed 02/12/2020. Continue with topical treatment and follow up in 1 week at wound care clinic  Hx as above.  Pt. States that she has an appt. W/ Pain Management as above.  Review of Systems    Objective:    Wounds nearly healed w/o Marilu's sign or s/s of infection.  Physical Exam    Assessment:       1. Venous stasis dermatitis of left lower extremity           Wound 10/11/19 1035 Venous Ulcer lateral Leg #1 (Active)   10/11/19 1035    Pre-existing:    Primary Wound Type: Venous ulcer   Side: Left   Orientation: lateral   Location: Leg   Wound/PI Number (optional): #1   Ankle-Brachial Index:    Pulses: palpable DP/PT   Removal Indication and Assessment:    Wound Outcome:    (Retired) Wound Type:    (Retired) Wound Length (cm):    (Retired) Wound Width (cm):    (Retired) Depth (cm):    Wound Description (Comments):    Removal Indications:    Dressing Appearance Open to air 2/10/2020 11:00 AM   Drainage Amount None 2/10/2020 11:00 AM   Appearance Pink;Red;Dry;Epithelialization 2/10/2020 11:00 AM   Tissue loss description Partial thickness 2/10/2020 11:00 AM   Red (%), Wound Tissue Color 100 % 2/10/2020 11:00 AM   Periwound Area Pink;Redness;Dry 2/10/2020 11:00 AM   Wound Edges Undefined;Irregular 2/10/2020 11:00 AM   Wound Length (cm) 11 cm 2/10/2020 11:00 AM   Wound Width (cm) 9.4 cm 2/10/2020 11:00 AM   Wound Depth (cm) 0.1 cm 2/10/2020 11:00 AM   Wound Volume (cm^3) 10.34 cm^3 2/10/2020 11:00 AM   Wound Surface Area (cm^2) 103.4 cm^2 2/10/2020 11:00 AM   Care Cleansed with:;Sterile normal saline 2/10/2020 11:00 AM   Dressing Applied;Other (see comments);Tubular bandage 2/10/2020 11:00 AM   Periwound Care Topical treatment applied 2/10/2020 11:00 AM   Compression Tubular elasticized bandage 2/10/2020 11:00 AM   Dressing Change Due 02/17/20 2/10/2020 11:00 AM       Venous Ulcer L  lateral leg #1  Cleanse wound with: NS  Lidocaine: PRN debridement/discomfort in clinic  Primary dressing: Calmoseptine and betamethasone  Edema control: Tubigrip E to LLE toe to knee. Elevate legs and feet when resting keep edema down.  Frequency: Weekly in clinic      Plan:       Pt. Advised to f/u w/ Pain Management as per appt.; current Rx refilled x 10.  Wound care as ordered.         Follow up in about 1 week (around 2/17/2020) for Dr. Ibrahim.

## 2020-02-11 ENCOUNTER — TELEPHONE (OUTPATIENT)
Dept: PAIN MEDICINE | Facility: CLINIC | Age: 55
End: 2020-02-11

## 2020-02-11 ENCOUNTER — OFFICE VISIT (OUTPATIENT)
Dept: PAIN MEDICINE | Facility: CLINIC | Age: 55
End: 2020-02-11
Payer: MEDICARE

## 2020-02-11 VITALS
RESPIRATION RATE: 20 BRPM | HEIGHT: 70 IN | HEART RATE: 90 BPM | BODY MASS INDEX: 41.95 KG/M2 | OXYGEN SATURATION: 97 % | WEIGHT: 293 LBS

## 2020-02-11 DIAGNOSIS — G89.29 CHRONIC PAIN OF LEFT KNEE: Primary | ICD-10-CM

## 2020-02-11 DIAGNOSIS — M25.562 CHRONIC PAIN OF LEFT KNEE: Primary | ICD-10-CM

## 2020-02-11 DIAGNOSIS — G89.4 CHRONIC PAIN DISORDER: ICD-10-CM

## 2020-02-11 DIAGNOSIS — E66.01 MORBID OBESITY WITH BMI OF 40.0-44.9, ADULT: Chronic | ICD-10-CM

## 2020-02-11 DIAGNOSIS — Z96.652 H/O TOTAL KNEE REPLACEMENT, LEFT: ICD-10-CM

## 2020-02-11 PROCEDURE — 99213 OFFICE O/P EST LOW 20 MIN: CPT | Mod: PBBFAC,PN | Performed by: ANESTHESIOLOGY

## 2020-02-11 PROCEDURE — 99999 PR PBB SHADOW E&M-EST. PATIENT-LVL III: CPT | Mod: PBBFAC,,, | Performed by: ANESTHESIOLOGY

## 2020-02-11 PROCEDURE — 99999 PR PBB SHADOW E&M-EST. PATIENT-LVL III: ICD-10-PCS | Mod: PBBFAC,,, | Performed by: ANESTHESIOLOGY

## 2020-02-11 RX ORDER — TRAMADOL HYDROCHLORIDE 50 MG/1
50 TABLET ORAL EVERY 8 HOURS PRN
Qty: 15 TABLET | Refills: 0 | Status: SHIPPED | OUTPATIENT
Start: 2020-02-11 | End: 2020-02-17

## 2020-02-11 NOTE — TELEPHONE ENCOUNTER
----- Message from Rola Childs sent at 2/11/2020  3:49 PM CST -----  Contact: self  Patient is requesting a call back concerning her medication for traMADol (ULTRAM) 50 mg tab and the Pharmacy needs Clarification for the medication. Please call

## 2020-02-11 NOTE — LETTER
February 11, 2020      Rosario Lewis, DO  [address]  [address]   [facility] [address]           [facility]  [address]  [facility] [address]  Phone: [phone]  Fax: [phone]          Patient: Danika Voss   MR Number: [id number]   YOB: 1965   Date of Visit: 2/11/2020       Dear Dr. Rosario Lewis:    Thank you for referring Danika Voss to me for evaluation. Attached you will find relevant portions of my assessment and plan of care.    If you have questions, please do not hesitate to call me. I look forward to following Danika Voss along with you.    Sincerely,    Jeremias Tavera III, MD    Enclosure  CC:  No Recipients    If you would like to receive this communication electronically, please contact [email or web address] or [phone] to request more information on [employer] Link access.    For providers and/or their staff who would like to refer a patient to [facility], please contact us through our one-stop-shop provider referral line, [facility], at [phone].    If you feel you have received this communication in error or would no longer like to receive these types of communications, please e-mail [email or web address]

## 2020-02-11 NOTE — TELEPHONE ENCOUNTER
Spoke with patient and she states that [facility] will not fill her prescription for Tramadol 50mg because of her other medications.     Called [facility] and spoke with Robin and he states that her account is flagged for going to multiple providers for pain medications.     Spoke with Dr. Tavera and she informed the patient to hold the prescription until tomorrow. Spoke with Robin and informed him of this information. Robin informed me that the patient took her prescription to try and fill at another location.
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
## 2020-02-12 DIAGNOSIS — M25.562 CHRONIC PAIN OF LEFT KNEE: Primary | ICD-10-CM

## 2020-02-12 DIAGNOSIS — Z96.652 H/O TOTAL KNEE REPLACEMENT, LEFT: ICD-10-CM

## 2020-02-12 DIAGNOSIS — G89.29 CHRONIC PAIN OF LEFT KNEE: Primary | ICD-10-CM

## 2020-02-17 DIAGNOSIS — G47.09 OTHER INSOMNIA: ICD-10-CM

## 2020-02-17 RX ORDER — TRAZODONE HYDROCHLORIDE 50 MG/1
50 TABLET ORAL NIGHTLY PRN
Qty: 30 TABLET | Refills: 2 | Status: SHIPPED | OUTPATIENT
Start: 2020-02-17 | End: 2020-04-10

## 2020-02-18 DIAGNOSIS — J31.0 CHRONIC RHINITIS: ICD-10-CM

## 2020-02-18 RX ORDER — MONTELUKAST SODIUM 10 MG/1
TABLET ORAL
Qty: 30 TABLET | Refills: 2 | Status: SHIPPED | OUTPATIENT
Start: 2020-02-18 | End: 2020-03-23

## 2020-02-24 ENCOUNTER — TELEPHONE (OUTPATIENT)
Dept: FAMILY MEDICINE | Facility: CLINIC | Age: 55
End: 2020-02-24

## 2020-02-24 DIAGNOSIS — M47.896 OTHER SPONDYLOSIS, LUMBAR REGION: ICD-10-CM

## 2020-02-24 DIAGNOSIS — G89.4 CHRONIC PAIN DISORDER: Primary | Chronic | ICD-10-CM

## 2020-02-24 NOTE — TELEPHONE ENCOUNTER
Patient is requesting  a referral for [facility]. Please advise.     She is aware that Dr. Lewis I out until Wednesday and this will be handled when she returns.

## 2020-02-26 NOTE — TELEPHONE ENCOUNTER
Referral placed. Please fax to [facility] per pt request.   Dr. Rosario Lewis D.O.   [facility]

## 2020-02-28 ENCOUNTER — TELEPHONE (OUTPATIENT)
Dept: FAMILY MEDICINE | Facility: CLINIC | Age: 55
End: 2020-02-28

## 2020-02-28 NOTE — TELEPHONE ENCOUNTER
----- Message from Anisha Spicer sent at 2/28/2020  1:09 PM CST -----  Contact: Self [phone]  Patient is calling to let  know she will no longer need services.

## 2020-02-28 NOTE — TELEPHONE ENCOUNTER
"Called and spoke to patient and informed that due to liver functions Dr. Lewis could not increase her dosage to 6 pills a day. Patient states that she needs to take the medication 6 times. Informed that she will need to speak to her pain doctor in regards to an alternative that won't affect her liver functions. Patient states "she shouldn't have never stopped me from taking 6"   "

## 2020-02-28 NOTE — TELEPHONE ENCOUNTER
----- Message from Norma George sent at 2/28/2020 12:13 PM CST -----  Contact: self [phone]  Type:  RX Refill Request    Who Called: Danika  Refill or New Rx:refill  RX Name and Strength:gabapentin (NEURONTIN) 600 MG tablet  How is the patient currently taking it? (ex. 1XDay):she would like it upped to 6 times a day  Is this a 30 day or 90 day RX:30 day  Preferred Pharmacy with phone number:[id number] [place] [facility] [address] [facility]  Local or Mail Order:local  Ordering Provider:mali  Would the patient rather a call back or a response via [facility]? callback  Best Call Back Number:[phone]  Additional Information:

## 2020-03-02 ENCOUNTER — TELEPHONE (OUTPATIENT)
Dept: FAMILY MEDICINE | Facility: CLINIC | Age: 55
End: 2020-03-02

## 2020-03-02 NOTE — TELEPHONE ENCOUNTER
Spoke with pt, pt stated she was giving us the number for the pain management she wanted to go to.

## 2020-03-02 NOTE — TELEPHONE ENCOUNTER
----- Message from Lenin León sent at 3/2/2020  8:10 AM CST -----  Contact: pt   Pt would like a call back regarding  Needing referral for pain management sent to [facility] pain [phone]    Pt can be reached at [phone]

## 2020-03-18 DIAGNOSIS — F41.1 GENERALIZED ANXIETY DISORDER: Primary | ICD-10-CM

## 2020-03-18 DIAGNOSIS — I10 ESSENTIAL HYPERTENSION: Chronic | ICD-10-CM

## 2020-03-18 RX ORDER — DULOXETIN HYDROCHLORIDE 60 MG/1
CAPSULE, DELAYED RELEASE ORAL
Qty: 90 CAPSULE | Refills: 2 | Status: ON HOLD | OUTPATIENT
Start: 2020-03-18 | End: 2020-10-22 | Stop reason: HOSPADM

## 2020-03-18 RX ORDER — CARVEDILOL 3.12 MG/1
3.12 TABLET ORAL 2 TIMES DAILY
Qty: 180 TABLET | Refills: 2 | Status: SHIPPED | OUTPATIENT
Start: 2020-03-18 | End: 2020-05-26

## 2020-03-20 ENCOUNTER — TELEPHONE (OUTPATIENT)
Dept: FAMILY MEDICINE | Facility: CLINIC | Age: 55
End: 2020-03-20

## 2020-03-20 NOTE — TELEPHONE ENCOUNTER
----- Message from Monse Quinones sent at 3/20/2020 11:21 AM CDT -----  Contact: [phone]/self  Type:  Needs Medical Advice    Who Called: pt  Advice Regarding: clearance to have her tooth pulled, her bp is elevated  Would the patient rather a call back or a response via [facility]? call  Best Call Back Number: [phone]  Additional Information: n/a

## 2020-03-23 ENCOUNTER — OFFICE VISIT (OUTPATIENT)
Dept: FAMILY MEDICINE | Facility: CLINIC | Age: 55
End: 2020-03-23
Payer: MEDICARE

## 2020-03-23 VITALS
HEART RATE: 93 BPM | RESPIRATION RATE: 18 BRPM | SYSTOLIC BLOOD PRESSURE: 138 MMHG | TEMPERATURE: 98 F | BODY MASS INDEX: 41.95 KG/M2 | OXYGEN SATURATION: 98 % | WEIGHT: 293 LBS | HEIGHT: 70 IN | DIASTOLIC BLOOD PRESSURE: 86 MMHG

## 2020-03-23 DIAGNOSIS — F33.1 MODERATE EPISODE OF RECURRENT MAJOR DEPRESSIVE DISORDER: ICD-10-CM

## 2020-03-23 DIAGNOSIS — Z11.4 SCREENING FOR HIV (HUMAN IMMUNODEFICIENCY VIRUS): ICD-10-CM

## 2020-03-23 DIAGNOSIS — F41.1 GENERALIZED ANXIETY DISORDER: ICD-10-CM

## 2020-03-23 DIAGNOSIS — I10 ESSENTIAL HYPERTENSION: Primary | Chronic | ICD-10-CM

## 2020-03-23 DIAGNOSIS — Z12.31 ENCOUNTER FOR SCREENING MAMMOGRAM FOR BREAST CANCER: ICD-10-CM

## 2020-03-23 PROBLEM — L97.929 VENOUS ULCER OF LEFT LEG: Status: RESOLVED | Noted: 2019-09-04 | Resolved: 2020-03-23

## 2020-03-23 PROBLEM — I83.029 VENOUS ULCER OF LEFT LEG: Status: RESOLVED | Noted: 2019-09-04 | Resolved: 2020-03-23

## 2020-03-23 PROCEDURE — 99999 PR PBB SHADOW E&M-EST. PATIENT-LVL V: ICD-10-PCS | Mod: PBBFAC,HCNC,, | Performed by: FAMILY MEDICINE

## 2020-03-23 PROCEDURE — 3008F BODY MASS INDEX DOCD: CPT | Mod: HCNC,CPTII,S$GLB, | Performed by: FAMILY MEDICINE

## 2020-03-23 PROCEDURE — 3075F PR MOST RECENT SYSTOLIC BLOOD PRESS GE 130-139MM HG: ICD-10-PCS | Mod: HCNC,CPTII,S$GLB, | Performed by: FAMILY MEDICINE

## 2020-03-23 PROCEDURE — 99214 PR OFFICE/OUTPT VISIT, EST, LEVL IV, 30-39 MIN: ICD-10-PCS | Mod: HCNC,S$GLB,, | Performed by: FAMILY MEDICINE

## 2020-03-23 PROCEDURE — 3079F DIAST BP 80-89 MM HG: CPT | Mod: HCNC,CPTII,S$GLB, | Performed by: FAMILY MEDICINE

## 2020-03-23 PROCEDURE — 3079F PR MOST RECENT DIASTOLIC BLOOD PRESSURE 80-89 MM HG: ICD-10-PCS | Mod: HCNC,CPTII,S$GLB, | Performed by: FAMILY MEDICINE

## 2020-03-23 PROCEDURE — 3075F SYST BP GE 130 - 139MM HG: CPT | Mod: HCNC,CPTII,S$GLB, | Performed by: FAMILY MEDICINE

## 2020-03-23 PROCEDURE — 99999 PR PBB SHADOW E&M-EST. PATIENT-LVL V: CPT | Mod: PBBFAC,HCNC,, | Performed by: FAMILY MEDICINE

## 2020-03-23 PROCEDURE — 99214 OFFICE O/P EST MOD 30 MIN: CPT | Mod: HCNC,S$GLB,, | Performed by: FAMILY MEDICINE

## 2020-03-23 PROCEDURE — 3008F PR BODY MASS INDEX (BMI) DOCUMENTED: ICD-10-PCS | Mod: HCNC,CPTII,S$GLB, | Performed by: FAMILY MEDICINE

## 2020-03-23 RX ORDER — VALSARTAN AND HYDROCHLOROTHIAZIDE 160; 12.5 MG/1; MG/1
1 TABLET, FILM COATED ORAL DAILY
Qty: 90 TABLET | Refills: 1 | Status: SHIPPED | OUTPATIENT
Start: 2020-03-23 | End: 2020-08-18 | Stop reason: SDUPTHER

## 2020-03-23 RX ORDER — DILTIAZEM HYDROCHLORIDE 240 MG/1
240 CAPSULE, COATED, EXTENDED RELEASE ORAL DAILY
Qty: 90 CAPSULE | Refills: 1 | Status: SHIPPED | OUTPATIENT
Start: 2020-03-23 | End: 2020-08-18 | Stop reason: SDUPTHER

## 2020-03-23 NOTE — ASSESSMENT & PLAN NOTE
Lab Results   Component Value Date    TSH 0.543 06/27/2019     - well controlled  - continue current medications  - recommend repeat labs prior to next appt

## 2020-03-23 NOTE — ASSESSMENT & PLAN NOTE
- increased symptoms due to pain  - denies suicidal thoughts  - encouraged to patient to notify me of any questions or concerns
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
## 2020-03-23 NOTE — PATIENT INSTRUCTIONS
1. Okay to take Carvedilol 3.125 mg 2-3 tabs prior to procedure  2. Central Scheduling [phone]

## 2020-03-23 NOTE — ASSESSMENT & PLAN NOTE
- may be reactive since BP well controlled at visit today  - she did not take carvedilol this AM and okay to take 2-3 tabs prior to procedure otherwise well controlled  - okay to proceed with dental procedure without restrictions  - no acute cardiopulmonary issues  - continue same BP medications

## 2020-03-23 NOTE — ASSESSMENT & PLAN NOTE
- worsening due to dental issues  - encouraged to patient to notify me of any questions or concerns

## 2020-04-10 DIAGNOSIS — G47.09 OTHER INSOMNIA: ICD-10-CM

## 2020-04-10 RX ORDER — TRAZODONE HYDROCHLORIDE 50 MG/1
50 TABLET ORAL NIGHTLY PRN
Qty: 30 TABLET | Refills: 2 | Status: SHIPPED | OUTPATIENT
Start: 2020-04-10 | End: 2020-07-06

## 2020-04-14 NOTE — TELEPHONE ENCOUNTER
Check CBC   Dr. Gregory,  This is a patient of Dr. Davila.  She is calling for a refill on her Levothyroxine.  HOWEVER, According to patient records - patient has not been prescribed this medication since Jan. 2016 and only for 10 tablets by Dr. Esteves. I had Sonni contact the patient and the Patient states her levothyroxine is now 100mcg and she was receiving it from a doctor in [place]. The patient has since seen Dr. Davila 5 times since February 2018 and levothyroxine was never addressed on any of these visit stating patient is still on medication - so on med rec for 5 visits this is not addressed -     So do I fill this medication at the dose that patient is requesting, or do I make this wait until Dr. Davila returns since there is questions regarding medication and dose?

## 2020-04-17 ENCOUNTER — HOSPITAL ENCOUNTER (OUTPATIENT)
Dept: WOUND CARE | Facility: HOSPITAL | Age: 55
Discharge: HOME | End: 2020-04-17
Attending: EMERGENCY MEDICINE
Payer: MEDICARE

## 2020-04-17 VITALS
TEMPERATURE: 98 F | SYSTOLIC BLOOD PRESSURE: 159 MMHG | DIASTOLIC BLOOD PRESSURE: 82 MMHG | HEART RATE: 77 BPM | HEIGHT: 70 IN | BODY MASS INDEX: 41.95 KG/M2 | WEIGHT: 293 LBS

## 2020-04-17 DIAGNOSIS — I87.2 VENOUS STASIS DERMATITIS OF LEFT LOWER EXTREMITY: Primary | ICD-10-CM

## 2020-04-17 PROCEDURE — 99203 OFFICE O/P NEW LOW 30 MIN: CPT | Mod: HCNC

## 2020-04-17 NOTE — PROGRESS NOTES
Subjective:       Patient ID: Danika Voss is a 54 y.o. female.    Chief Complaint: Wound Consult (left leg)    Client returns with dry, scaly, itchy skin to lower left leg. Seen by Dr. Ibrahim. Rx for betamethasone and hydroxazine provided. Referred tp Dr. Metcalf, dermatologist.   Hx as above.  Review of Systems    Objective:    Erythematous erosions noted over L leg w/o Marilu's sign, edema, or s/s of infection.  Physical Exam    Assessment:       No diagnosis found.       Wound 04/17/20 0830 Abrasion(s) Left lower Leg #1 (Active)   04/17/20 0830    Pre-existing: Yes   Primary Wound Type: Abrasion(s)   Side: Left   Orientation: lower   Location: Leg   Wound/PI Number (optional): #1   Ankle-Brachial Index:    Pulses:    Removal Indication and Assessment:    Wound Outcome:    (Retired) Wound Type:    (Retired) Wound Length (cm):    (Retired) Wound Width (cm):    (Retired) Depth (cm):    Wound Description (Comments):    Removal Indications:    Wound Image   4/17/2020 11:07 AM   Dressing Appearance Open to air 4/17/2020 11:07 AM   Drainage Amount None 4/17/2020 11:07 AM   Appearance Pink;Red;Dry 4/17/2020 11:07 AM   Tissue loss description Not applicable 4/17/2020 11:07 AM   Red (%), Wound Tissue Color 100 % 4/17/2020 11:07 AM   Periwound Area Dry;Hemosiderin Staining;Other (see comments) 4/17/2020 11:07 AM   Wound Edges Undefined 4/17/2020 10:15 AM   Wound Length (cm) 15 cm 4/17/2020 11:07 AM   Wound Width (cm) 4 cm 4/17/2020 11:07 AM   Wound Depth (cm) 0 cm 4/17/2020 11:07 AM   Wound Volume (cm^3) 0 cm^3 4/17/2020 11:07 AM   Wound Surface Area (cm^2) 60 cm^2 4/17/2020 11:07 AM   Care Cleansed with:;Soap and water 4/17/2020 11:07 AM   Dressing Applied;Other (see comments) 4/17/2020 11:07 AM   Periwound Care Dry periwound area maintained 4/17/2020 11:07 AM   Dressing Change Due 04/18/20 4/17/2020 11:07 AM       Betamethasone to area twice daily.    Plan:            Referred to Dr. Metcalf.

## 2020-04-21 ENCOUNTER — TELEPHONE (OUTPATIENT)
Dept: FAMILY MEDICINE | Facility: CLINIC | Age: 55
End: 2020-04-21

## 2020-04-21 NOTE — TELEPHONE ENCOUNTER
----- Message from Marisela Figueroa RN sent at 2/26/2020  1:01 PM CST -----  Spoke with patient about procedure.She stated that she has had this procedure done before and it did not work.She will call to cancel.Please call surgery if surgery is going to be canceled...Thanks

## 2020-05-01 DIAGNOSIS — G89.4 CHRONIC PAIN DISORDER: Chronic | ICD-10-CM

## 2020-05-01 RX ORDER — GABAPENTIN 600 MG/1
600 TABLET ORAL 4 TIMES DAILY
Qty: 360 TABLET | Refills: 1 | Status: ON HOLD | OUTPATIENT
Start: 2020-05-01 | End: 2020-10-22 | Stop reason: HOSPADM

## 2020-05-26 ENCOUNTER — TELEPHONE (OUTPATIENT)
Dept: FAMILY MEDICINE | Facility: CLINIC | Age: 55
End: 2020-05-26

## 2020-05-26 DIAGNOSIS — I10 ESSENTIAL HYPERTENSION: Chronic | ICD-10-CM

## 2020-05-26 RX ORDER — CARVEDILOL 12.5 MG/1
12.5 TABLET ORAL 2 TIMES DAILY
Qty: 180 TABLET | Refills: 1 | Status: SHIPPED | OUTPATIENT
Start: 2020-05-26 | End: 2020-11-18

## 2020-05-26 NOTE — TELEPHONE ENCOUNTER
She does not need to take that much. I will change her dose.  1. She was on Coreg 3.125 mg with 3 in AM (9.375 mg) and 2 in PM (6.25 mg in PM); which is not how I prescribed it  2. I recommend that she change to 6.25 mg twice a day since she is doing that  3. I sent Coreg 6.25 mg once pill twice a day. If we need to go up later, we can go up to 12.5 mg twice a day.   Rx sent  Dr. Rosario Lweis D.O.   Family Medicine

## 2020-05-26 NOTE — TELEPHONE ENCOUNTER
----- Message from Ming Marcell sent at 5/26/2020  4:11 PM CDT -----  Contact: [phone] / self   Patient would like a refill for the medication carvediloL (COREG) 3.125 MG tablet, Pharmacy is [facility] [place], [place]. Patient would like an increased dosage states she is completely out. Please Advise.

## 2020-05-26 NOTE — TELEPHONE ENCOUNTER
Spoke with pt, stated pt has refills at the pharmacy she could call them. Pt stated they will not refill them since it is not time. Asked pt how she is taking it she stated Dr. Lewis told me to take 5 pills a day 2 in morning and 3 at night.     Please advise.

## 2020-06-10 ENCOUNTER — PATIENT OUTREACH (OUTPATIENT)
Dept: ADMINISTRATIVE | Facility: HOSPITAL | Age: 55
End: 2020-06-10

## 2020-07-02 ENCOUNTER — TELEPHONE (OUTPATIENT)
Dept: FAMILY MEDICINE | Facility: CLINIC | Age: 55
End: 2020-07-02

## 2020-07-02 NOTE — TELEPHONE ENCOUNTER
----- Message from Rosario Lewis DO sent at 7/2/2020  8:02 AM CDT -----  Please call patient. She cancelled visit 6/24/2020 to review her labs. Labs normal expect her liver tests continue to worsen. I highly recommend that she see her Hepatologist as we have discussed in the past

## 2020-07-08 ENCOUNTER — TELEPHONE (OUTPATIENT)
Dept: FAMILY MEDICINE | Facility: CLINIC | Age: 55
End: 2020-07-08

## 2020-07-08 NOTE — TELEPHONE ENCOUNTER
----- Message from Aniceto Banks sent at 7/8/2020  1:47 PM CDT -----  Type:  Needs Medical Advice    Who Called: self  Reason:returning call from rodolfo  Would the patient rather a call back or a response via [facility]? callback  Best Call Back Number: [phone]  Additional Information: none

## 2020-07-24 ENCOUNTER — TELEPHONE (OUTPATIENT)
Dept: ORTHOPEDICS | Facility: CLINIC | Age: 55
End: 2020-07-24

## 2020-07-27 ENCOUNTER — LAB VISIT (OUTPATIENT)
Dept: LAB | Facility: HOSPITAL | Age: 55
End: 2020-07-27
Payer: MEDICARE

## 2020-07-27 ENCOUNTER — OFFICE VISIT (OUTPATIENT)
Dept: HEPATOLOGY | Facility: CLINIC | Age: 55
End: 2020-07-27
Payer: MEDICARE

## 2020-07-27 ENCOUNTER — TELEPHONE (OUTPATIENT)
Dept: HEPATOLOGY | Facility: CLINIC | Age: 55
End: 2020-07-27

## 2020-07-27 VITALS
SYSTOLIC BLOOD PRESSURE: 148 MMHG | RESPIRATION RATE: 18 BRPM | WEIGHT: 293 LBS | HEIGHT: 70 IN | HEART RATE: 75 BPM | BODY MASS INDEX: 41.95 KG/M2 | DIASTOLIC BLOOD PRESSURE: 80 MMHG | OXYGEN SATURATION: 98 % | TEMPERATURE: 97 F

## 2020-07-27 DIAGNOSIS — R74.8 ELEVATED LIVER ENZYMES: ICD-10-CM

## 2020-07-27 DIAGNOSIS — K74.60 HEPATIC CIRRHOSIS, UNSPECIFIED HEPATIC CIRRHOSIS TYPE, UNSPECIFIED WHETHER ASCITES PRESENT: Primary | ICD-10-CM

## 2020-07-27 DIAGNOSIS — Z23 NEED FOR HEPATITIS A AND B VACCINATION: ICD-10-CM

## 2020-07-27 DIAGNOSIS — Z86.19 HISTORY OF HEPATITIS C: ICD-10-CM

## 2020-07-27 DIAGNOSIS — K76.0 FATTY LIVER: ICD-10-CM

## 2020-07-27 DIAGNOSIS — K74.60 HEPATIC CIRRHOSIS, UNSPECIFIED HEPATIC CIRRHOSIS TYPE, UNSPECIFIED WHETHER ASCITES PRESENT: ICD-10-CM

## 2020-07-27 LAB
A1AT SERPL-MCNC: 141 MG/DL (ref 100–190)
AFP SERPL-MCNC: 7.1 NG/ML (ref 0–8.4)
ALBUMIN SERPL BCP-MCNC: 3.2 G/DL (ref 3.5–5.2)
ALBUMIN SERPL BCP-MCNC: 3.2 G/DL (ref 3.5–5.2)
ALP SERPL-CCNC: 113 U/L (ref 55–135)
ALP SERPL-CCNC: 113 U/L (ref 55–135)
ALT SERPL W/O P-5'-P-CCNC: 197 U/L (ref 10–44)
ALT SERPL W/O P-5'-P-CCNC: 197 U/L (ref 10–44)
ANION GAP SERPL CALC-SCNC: 4 MMOL/L (ref 8–16)
AST SERPL-CCNC: 245 U/L (ref 10–40)
AST SERPL-CCNC: 245 U/L (ref 10–40)
BASOPHILS # BLD AUTO: 0.08 K/UL (ref 0–0.2)
BASOPHILS NFR BLD: 1.4 % (ref 0–1.9)
BILIRUB DIRECT SERPL-MCNC: 0.7 MG/DL (ref 0.1–0.3)
BILIRUB SERPL-MCNC: 1.3 MG/DL (ref 0.1–1)
BILIRUB SERPL-MCNC: 1.3 MG/DL (ref 0.1–1)
BUN SERPL-MCNC: 9 MG/DL (ref 6–20)
CALCIUM SERPL-MCNC: 9.6 MG/DL (ref 8.7–10.5)
CERULOPLASMIN SERPL-MCNC: 30 MG/DL (ref 15–45)
CHLORIDE SERPL-SCNC: 106 MMOL/L (ref 95–110)
CK SERPL-CCNC: 55 U/L (ref 20–180)
CO2 SERPL-SCNC: 28 MMOL/L (ref 23–29)
CREAT SERPL-MCNC: 0.7 MG/DL (ref 0.5–1.4)
DIFFERENTIAL METHOD: ABNORMAL
EOSINOPHIL # BLD AUTO: 0.4 K/UL (ref 0–0.5)
EOSINOPHIL NFR BLD: 7.5 % (ref 0–8)
ERYTHROCYTE [DISTWIDTH] IN BLOOD BY AUTOMATED COUNT: 13.9 % (ref 11.5–14.5)
EST. GFR  (AFRICAN AMERICAN): >60 ML/MIN/1.73 M^2
EST. GFR  (NON AFRICAN AMERICAN): >60 ML/MIN/1.73 M^2
FERRITIN SERPL-MCNC: 338 NG/ML (ref 20–300)
GLUCOSE SERPL-MCNC: 82 MG/DL (ref 70–110)
HCT VFR BLD AUTO: 42.3 % (ref 37–48.5)
HGB BLD-MCNC: 13.5 G/DL (ref 12–16)
IGG SERPL-MCNC: 2215 MG/DL (ref 650–1600)
IMM GRANULOCYTES # BLD AUTO: 0.01 K/UL (ref 0–0.04)
IMM GRANULOCYTES NFR BLD AUTO: 0.2 % (ref 0–0.5)
INR PPP: 1.1 (ref 0.8–1.2)
LYMPHOCYTES # BLD AUTO: 2.3 K/UL (ref 1–4.8)
LYMPHOCYTES NFR BLD: 39.5 % (ref 18–48)
MCH RBC QN AUTO: 33 PG (ref 27–31)
MCHC RBC AUTO-ENTMCNC: 31.9 G/DL (ref 32–36)
MCV RBC AUTO: 103 FL (ref 82–98)
MONOCYTES # BLD AUTO: 0.5 K/UL (ref 0.3–1)
MONOCYTES NFR BLD: 9.3 % (ref 4–15)
NEUTROPHILS # BLD AUTO: 2.4 K/UL (ref 1.8–7.7)
NEUTROPHILS NFR BLD: 42.1 % (ref 38–73)
NRBC BLD-RTO: 0 /100 WBC
PLATELET # BLD AUTO: 127 K/UL (ref 150–350)
PMV BLD AUTO: 11.9 FL (ref 9.2–12.9)
POTASSIUM SERPL-SCNC: 4 MMOL/L (ref 3.5–5.1)
PROT SERPL-MCNC: 7.7 G/DL (ref 6–8.4)
PROT SERPL-MCNC: 7.7 G/DL (ref 6–8.4)
PROTHROMBIN TIME: 12.1 SEC (ref 9–12.5)
RBC # BLD AUTO: 4.09 M/UL (ref 4–5.4)
SODIUM SERPL-SCNC: 138 MMOL/L (ref 136–145)
WBC # BLD AUTO: 5.7 K/UL (ref 3.9–12.7)

## 2020-07-27 PROCEDURE — 99205 OFFICE O/P NEW HI 60 MIN: CPT | Mod: HCNC,S$GLB,, | Performed by: NURSE PRACTITIONER

## 2020-07-27 PROCEDURE — 80053 COMPREHEN METABOLIC PANEL: CPT | Mod: HCNC

## 2020-07-27 PROCEDURE — 82728 ASSAY OF FERRITIN: CPT | Mod: HCNC

## 2020-07-27 PROCEDURE — 99499 RISK ADDL DX/OHS AUDIT: ICD-10-PCS | Mod: HCNC,S$GLB,, | Performed by: NURSE PRACTITIONER

## 2020-07-27 PROCEDURE — 86790 VIRUS ANTIBODY NOS: CPT | Mod: HCNC

## 2020-07-27 PROCEDURE — 3079F DIAST BP 80-89 MM HG: CPT | Mod: HCNC,CPTII,S$GLB, | Performed by: NURSE PRACTITIONER

## 2020-07-27 PROCEDURE — 3079F PR MOST RECENT DIASTOLIC BLOOD PRESSURE 80-89 MM HG: ICD-10-PCS | Mod: HCNC,CPTII,S$GLB, | Performed by: NURSE PRACTITIONER

## 2020-07-27 PROCEDURE — 87522 HEPATITIS C REVRS TRNSCRPJ: CPT | Mod: HCNC

## 2020-07-27 PROCEDURE — 82390 ASSAY OF CERULOPLASMIN: CPT | Mod: HCNC

## 2020-07-27 PROCEDURE — 3008F BODY MASS INDEX DOCD: CPT | Mod: HCNC,CPTII,S$GLB, | Performed by: NURSE PRACTITIONER

## 2020-07-27 PROCEDURE — 85610 PROTHROMBIN TIME: CPT | Mod: HCNC

## 2020-07-27 PROCEDURE — 86256 FLUORESCENT ANTIBODY TITER: CPT | Mod: 91,HCNC

## 2020-07-27 PROCEDURE — 36415 COLL VENOUS BLD VENIPUNCTURE: CPT | Mod: HCNC

## 2020-07-27 PROCEDURE — 99499 UNLISTED E&M SERVICE: CPT | Mod: HCNC,S$GLB,, | Performed by: NURSE PRACTITIONER

## 2020-07-27 PROCEDURE — 80321 ALCOHOLS BIOMARKERS 1OR 2: CPT | Mod: HCNC

## 2020-07-27 PROCEDURE — 82550 ASSAY OF CK (CPK): CPT | Mod: HCNC

## 2020-07-27 PROCEDURE — 99999 PR PBB SHADOW E&M-EST. PATIENT-LVL V: CPT | Mod: PBBFAC,HCNC,, | Performed by: NURSE PRACTITIONER

## 2020-07-27 PROCEDURE — 3008F PR BODY MASS INDEX (BMI) DOCUMENTED: ICD-10-PCS | Mod: HCNC,CPTII,S$GLB, | Performed by: NURSE PRACTITIONER

## 2020-07-27 PROCEDURE — 99999 PR PBB SHADOW E&M-EST. PATIENT-LVL V: ICD-10-PCS | Mod: PBBFAC,HCNC,, | Performed by: NURSE PRACTITIONER

## 2020-07-27 PROCEDURE — 85025 COMPLETE CBC W/AUTO DIFF WBC: CPT | Mod: HCNC

## 2020-07-27 PROCEDURE — 3077F SYST BP >= 140 MM HG: CPT | Mod: HCNC,CPTII,S$GLB, | Performed by: NURSE PRACTITIONER

## 2020-07-27 PROCEDURE — 99205 PR OFFICE/OUTPT VISIT, NEW, LEVL V, 60-74 MIN: ICD-10-PCS | Mod: HCNC,S$GLB,, | Performed by: NURSE PRACTITIONER

## 2020-07-27 PROCEDURE — 82105 ALPHA-FETOPROTEIN SERUM: CPT | Mod: HCNC

## 2020-07-27 PROCEDURE — 82784 ASSAY IGA/IGD/IGG/IGM EACH: CPT | Mod: HCNC

## 2020-07-27 PROCEDURE — 82103 ALPHA-1-ANTITRYPSIN TOTAL: CPT | Mod: HCNC

## 2020-07-27 PROCEDURE — 86235 NUCLEAR ANTIGEN ANTIBODY: CPT | Mod: HCNC

## 2020-07-27 PROCEDURE — 3077F PR MOST RECENT SYSTOLIC BLOOD PRESSURE >= 140 MM HG: ICD-10-PCS | Mod: HCNC,CPTII,S$GLB, | Performed by: NURSE PRACTITIONER

## 2020-07-27 PROCEDURE — 86038 ANTINUCLEAR ANTIBODIES: CPT | Mod: HCNC

## 2020-07-27 NOTE — TELEPHONE ENCOUNTER
----- Message from Rosario Stahl MA sent at 7/27/2020  4:13 PM CDT -----  There is a case request for an EGD for Dr. Angel at the [place] location. Dr. Angel does not come to [place]. Was it meant specifically for Dr. Angel? If so, it needs to be changed to the [facility] [place]. Thanks

## 2020-07-27 NOTE — PATIENT INSTRUCTIONS
- Labs today to monitor liver function and rule out other causes of chronic liver disease.  - Recommend complete abstinence from alcohol.   - Recommend vaccination for Hepatitis A and Hepatitis B.  - Liver cancer surveillance every 6 months, with limited abdominal USN with AFP measurement, due now.  - EGD for variceal surveillance, due now.  - Avoid non-steroidal anti-inflammatory drugs (NSAIDs) such as ibuprofen, Motrin, naprosyn, Alleve due to the risk of kidney damage  - Okay to take acetaminophen (Tylenol), no more than 2000 mg per day.  - Recommend low sodium (salt) diet - aim for 2 gram per day or less.  - Recommend high protein diet: 130 grams per day to prevent muscle mass loss. Recommended at least 1 protein shake daily using Premier Protein shakes    - Avoid raw seafoods or shellfish.  - To prevent the spread of Hepatitis C - Do not share razors, toothbrushes or eating utensils.

## 2020-07-27 NOTE — PROGRESS NOTES
[facility] VISIT NEW PT NOTE    REFERRING PROVIDER:  Dr. Rosario Lewis    CHIEF COMPLAINT: Cirrhosis    HPI: Ms. Voss is a 54 y.o. White female with PMH noted below, presenting for evaluation of Cirrhosis and elevated liver enzymes. She has HCV genotype 3, and has never received treatment. She has a prior history of cocaine use and heavy alcohol intake, but has been sober for the past 10 years. She states that she believes that she contracted HCV over a decade ago from a needle stick when changing the bed sheets for her son who was using IV heroin at the time. Last imaging (CT) in 6/2019 showed nodular liver, suggesting cirrhosis, with mild periportal lymphadenopathy, and moderate splenomegaly. Last LFTs on 6/18 showed a T.Bili of 1.1, ALT of 183, AST of 239. Platelets are low at 123,000 when last checked in 12/2019.   Her risk factors for the development of NAFLD/BURGOS include HTN and morbid obesity. She also has a history of Hypothyroidism. EGD in 7/2018 showed esophageal mucosal changes suspicious for short-segment Schafer's esophagus, erythematous mucosa in the antrum, and a normal duodenum. Biopsies were taken and showed no evidence of Schafer's, and the endoscopic evaluation showed only small patches of possible Schafer's. HP was negative as well, and there were no signs of portal hypertension. The recommendation was to repeat EGD in 2 years, which is due now. Today in clinic she is well appearing and denies jaundice, dark urine, hematemesis, or periods of confusion suggestive of hepatic encephalopathy. ROS as below.      Review of patient's allergies indicates:   Allergen Reactions    Fentanyl Other (See Comments)     Fentanyl patches burn skin  Burns her skin     Nsaids (non-steroidal anti-inflammatory drug) Other (See Comments)     gastritis  Stomach ulcers     Ketorolac Nausea And Vomiting and Rash    Lisinopril Other (See Comments)     cough  cough     Current Outpatient Medications on  File Prior to Visit   Medication Sig Dispense Refill    amitriptyline (ELAVIL) 25 MG tablet Take 25 mg by mouth once daily.  1    betamethasone valerate 0.1% (VALISONE) 0.1 % Crea Apply topically to the affected area(s) twice daily 15 g 0    carvediloL (COREG) 12.5 MG tablet Take 1 tablet (12.5 mg total) by mouth 2 (two) times daily. 180 tablet 1    diltiaZEM (CARDIZEM CD) 240 MG 24 hr capsule Take 1 capsule (240 mg total) by mouth once daily. 90 capsule 1    DULoxetine (CYMBALTA) 60 MG capsule TAKE 1 CAPSULE BY MOUTH EVERY DAY 90 capsule 2    gabapentin (NEURONTIN) 600 MG tablet TAKE 1 TABLET (600 MG TOTAL) BY MOUTH 4 (FOUR) TIMES DAILY. 360 tablet 1    hydrOXYzine (ATARAX) 50 MG tablet Take 1 tablet (50 mg total) by mouth 4 (four) times daily as needed for Itching. 30 tablet 1    hydrOXYzine (ATARAX) 50 MG tablet Take 1 tablet (50 mg total) by mouth every 6 (six) hours as needed for itching 30 tablet 0    levothyroxine (SYNTHROID) 100 MCG tablet TAKE 1 TABLET BY MOUTH EVERY DAY 90 tablet 1    oxyCODONE (ROXICODONE) 10 mg Tab immediate release tablet Take 10 mg by mouth 2 (two) times daily.  0    oxyCODONE-acetaminophen (PERCOCET)  mg per tablet Take 1 tablet by mouth every 6 (six) hours as needed for Pain. 28 tabs      oxyCODONE-acetaminophen (PERCOCET)  mg per tablet TAKE 1 TABLET BY MOUTH EVERY 6 HOURS AS NEEDED FOR PAIN 8 tablet 0    pantoprazole (PROTONIX) 40 MG tablet TAKE 1 TABLET BY MOUTH EVERY DAY 30 tablet 2    traZODone (DESYREL) 50 MG tablet TAKE 1 TABLET (50 MG TOTAL) BY MOUTH NIGHTLY AS NEEDED FOR INSOMNIA. 90 tablet 0    valsartan-hydrochlorothiazide (DIOVAN-HCT) 160-12.5 mg per tablet Take 1 tablet by mouth once daily. 90 tablet 1    [DISCONTINUED] flecainide (TAMBOCOR) 50 MG Tab Take 100 mg by mouth 2 (two) times daily.      albuterol 90 mcg/actuation inhaler Inhale 2 puffs into the lungs every 6 (six) hours as needed for Wheezing. Rescue 18 g 1     No current  facility-administered medications on file prior to visit.      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    PSHX:  has a past surgical history that includes Cholecystectomy (3/2010); Total knee arthroplasty (Bilateral, right 6/2014, left 9/30/2014); Hysterectomy; Cardiac surgery (4/7/2010); Tonsillectomy; shoulder cyst removal (Left, 2014); Total knee arthroplasty (Left, 7/21/2015); Abscess drainage (Left, 8/6/2015); Peripherally inserted central catheter insertion (Right, 8/6/2015); Esophagogastroduodenoscopy (N/A, 7/17/2018); and Colonoscopy (N/A, 7/27/2018).    FAMILY HISTORY: Son has a history of fatty liver disease.    SOCIAL HISTORY:   Social History     Tobacco Use   Smoking Status Never Smoker   Smokeless Tobacco Never Used     Social History     Substance and Sexual Activity   Alcohol Use Not Currently    Comment: Prior history of heavy alcohol intake, quit cold turkey 10 years ago.     Social History     Substance and Sexual Activity   Drug Use No     ROS:   GENERAL: Denies fever, chills, weight loss/gain, + fatigue.  HEENT: Denies headaches, dizziness, vision/hearing changes.  CARDIOVASCULAR: Denies chest pain, palpitations, or + LE edema  RESPIRATORY: Denies dyspnea, cough.  GI: + abdominal pain and bloating and rectal bleeding - hx of internal hemorrhoids seen on TC in 2018.  : Denies dysuria, hematuria.  SKIN: Denies rash, itching.   NEURO: Denies confusion, memory loss, or mood changes.  PSYCH: Denies depression or anxiety.  HEME/LYMPH: + easy bruising or bleeding.    PHYSICAL EXAM:   Friendly White female, in no acute distress; alert and oriented to person, place and time  VITALS: BP  "(!) 148/80 (BP Location: Left arm, Patient Position: Sitting, BP Method: Large (Automatic))   Pulse 75   Temp 97 °F (36.1 °C) (Oral)   Resp 18   Ht 5' 10" (1.778 m)   Wt (!) 152.7 kg (336 lb 10.3 oz)   SpO2 98%   BMI 48.30 kg/m²   HENT: Normocephalic, without obvious abnormality.   EYES: Sclerae anicteric. No conjunctival pallor.   NECK: Supple. No masses or cervical adenopathy.  CARDIOVASCULAR: Regular rate and rhythm. No murmurs.  RESPIRATORY: Normal respiratory effort. BBS CTA. No wheezes or crackles.  GI: Soft, obese abdomen. No masses palpable. No obvious ascites. + generalized tenderness upon palpation of entire abdomen.  EXTREMITIES:  No clubbing, cyanosis or edema.  SKIN: Warm and dry. No jaundice. +Telangectasias noted to upper chest wall. Open sores noted to LLE that are oozing serous fluid - will follow up with PCP for further evaluation/treatment/referrals.  NEURO:  Normal gait. No asterixis.  PSYCH:  Memory intact. Thought and speech pattern appropriate. Behavior normal. No depression or anxiety noted.    DIAGNOSTIC STUDIES:    EGD 7/17/2018:    Findings:        The esophagus and gastroesophageal junction were examined with white        light and narrow band imaging (NBI). There were esophageal mucosal        changes suspicious for short-segment Schafer's esophagus. These        changes involved the mucosa extending to the Z-line. Scattered        islands of salmon-colored mucosa were present. The maximum        longitudinal extent of these esophageal mucosal changes was 1 cm in        length. Mucosa was biopsied with a cold forceps for histology in a        targeted manner in the lower third of the esophagus. One specimen        bottle was sent to pathology. Verification of patient identification        for the specimen was done by the physician, nurse and technician.        Estimated blood loss was minimal. No varices were noted.        Moderately erythematous mucosa was found in the gastric " antrum.        Biopsies were taken with a cold forceps for histology. Verification        of patient identification for the specimen was done by the        physician, nurse and technician. Estimated blood loss was minimal.        No changes of portal hypertension or varices noted on this exam.        The examined duodenum was normal.     Impression:   - Esophageal mucosal changes suspicious for                         short-segment Schafer's esophagus. Biopsied.                         - Erythematous mucosa in the antrum. Biopsied.                         - Normal examined duodenum.     COLONOSCOPY 7/27/2018:    Findings:        The perianal and digital rectal examinations were normal.        Internal hemorrhoids were found during retroflexion. The hemorrhoids        were small and Grade I (internal hemorrhoids that do not prolapse).        A small amount of semi-liquid stool was found in the ascending colon        and in the cecum, interfering with visualization. Lavage of the area        was performed using a large amount, resulting in clearance with        adequate visualization.     Impression:   - Internal hemorrhoids.                         - No specimens collected.     US ABDOMEN COMPLETE 4/16/2018:    FINDINGS:  The visualized portion of pancreas, aorta, and IVC are unremarkable.  The gallbladder is surgically absent.  The liver measures 17 cm and has a nonspecific coarse echotexture.  The right kidney measures 13 cm and is unremarkable.  There is no biliary ductal dilatation.  The common duct measures 0.5 cm.  The spleen measures 12.5 cm and is unremarkable.  The left kidney measures 13.1 cm and is unremarkable.     IMPRESSION:      Nonspecific coarse hepatic echotexture.        CT ABDOMEN PELVIS WITH CONTRAST 6/27/2019:     FINDINGS:  Small fat containing umbilical hernia.  No fluid collections.  Scattered colonic diverticulum.  No pericolonic fat stranding/inflammatory change.  The appendix and terminal  ileum are unremarkable.  There is mild periportal/pericaval lymphadenopathy.  Mild scattered calcified atherosclerotic disease.  No dilated loops of small bowel.  No free air.     Nodular liver suggesting cirrhosis.  Main portal veins are patent.  No liver mass.  Prior cholecystectomy.  Common bile duct within normal limits.  No pancreatic ductal dilatation or masses. There is moderate splenomegaly.  The adrenal glands are unremarkable.  No hydronephrosis or renal masses.  No marrow replacement process.  Prior hysterectomy noted.  Bladder and adnexa are grossly unremarkable.     Impression:     No clear etiology for abdominal pain identified.     Nodular liver, suggesting cirrhosis.     Mild periportal lymphadenopathy.     Moderate splenomegaly.     Prior cholecystectomy and hysterectomy.     EDUCATION:    Cirrhosis Counseling  - strict abstinence of alcohol use (includes beer, wine, and/or liquor)  - avoid non-steroidal anti-inflammatory drugs (NSAIDs) such as ibuprofen, Motrin, naprosyn, Alleve due to the risk of kidney damage  - can take acetaminophen (Tylenol), no more than 2000 mg per day  - low sodium (salt) 2 gram per day diet  - high protein diet: 130 grams per day to prevent muscle mass loss. Recommended at least 1 protein shake daily using Premier Protein shakes.   - resistance exercises for muscle strength  - avoid raw seafoods due to the risk of fatal Vibrio vulnificus infection  - ultrasound of the liver every 6 months for liver cancer screening  - Upper endoscopy every 1-2 years to screen for varices in the stomach and esophagus    ASSESSMENT/PLAN:  54 y.o. White female with laboratory and sonographic findings of cirrhosis and elevated liver enzymes, secondary to Chronic HCV (genotype 3, treatment naive), fatty liver disease, and prior alcohol abuse. We will obtain labs today to monitor her liver function, and rule out other causes of chronic liver disease. I recommend continued complete abstinence  from alcohol and drugs, and vaccination for HAV/HBV, if not immune. The disease process and manifestations of cirrhosis were discussed. We discussed implications of a cirrhosis diagnosis with HCC screenings and EGD and why it is important for us to properly monitor for potential complications. We discussed the increased risk of hepatocellular carcinoma due to cirrhosis. Continued screening every six months with ultrasound and AFP is recommended, and was discussed with the patient. I have placed a referral for EGD for variceal surveillance, which is due now. Once labs result, we will refer patient to HCV clinic, as she is eager and willing to pursue treatment.    Follow up in about 6 months (around 1/27/2021)., sooner if needed.    Thank you for allowing me to participate in the care of Danika Voss       Hepatology Nurse Practitioner  [facility]  7/27/2020 @ 1530    CC'ed note to:   Rosario Lewis, DO Rosario Lewis DO

## 2020-07-27 NOTE — TELEPHONE ENCOUNTER
Contacted patient and informed her of message from EGD, patient stated she will contact the department back and give clarity on what she would like to do.     Call:       MICHAELA Jean Staff   Caller: Unspecified (Today,  4:13 PM)             There is a case request for an EGD for Dr. Angel at the [place] location. Dr. Angel does not come to [place]. Was it meant specifically for Dr. Angel? If so, it needs to be changed to the [facility]. Thanks

## 2020-07-27 NOTE — LETTER
July 27, 2020      Rosario Lewis, DO  [address]   [address]   [place] [address]           [facility]atology  [address]  [facility] [address]  Phone: [phone]  Fax: [phone]          Patient: Danika Voss   MR Number: [id number]   YOB: 1965   Date of Visit: 7/27/2020       Dear Dr. Rosario Lewis:    Thank you for referring Danika Voss to me for evaluation. Attached you will find relevant portions of my assessment and plan of care.    If you have questions, please do not hesitate to call me. I look forward to following Danika Voss along with you.    Sincerely,    Marcie Aleman, DEYVI    Enclosure  CC:  No Recipients    If you would like to receive this communication electronically, please contact [email or web address] or [phone] to request more information on [facility] access.    For providers and/or their staff who would like to refer a patient to [facility], please contact us through our one-stop-shop provider referral line, [facility], at [phone].    If you feel you have received this communication in error or would no longer like to receive these types of communications, please e-mail [email or web address]

## 2020-07-28 LAB — HEPATITIS A ANTIBODY, IGG: POSITIVE

## 2020-07-29 ENCOUNTER — TELEPHONE (OUTPATIENT)
Dept: GASTROENTEROLOGY | Facility: CLINIC | Age: 55
End: 2020-07-29

## 2020-07-29 ENCOUNTER — TELEPHONE (OUTPATIENT)
Dept: ORTHOPEDICS | Facility: CLINIC | Age: 55
End: 2020-07-29

## 2020-07-29 ENCOUNTER — IMMUNIZATION (OUTPATIENT)
Dept: PHARMACY | Facility: CLINIC | Age: 55
End: 2020-07-29
Payer: MEDICARE

## 2020-07-29 DIAGNOSIS — Z01.818 PREOP EXAMINATION: Primary | ICD-10-CM

## 2020-07-29 NOTE — TELEPHONE ENCOUNTER
----- Message from Polina Davila sent at 7/29/2020 11:29 AM CDT -----  Regarding: Call back  Type:  Patient Returning Call    Who Called: patient  Who Left Message for Patient: Suyapa  Does the patient know what this is regarding?: yes  Would the patient rather a call back or a response via [facility]?  Call back  Best Call Back Number: [phone]  Additional Information:  please call today

## 2020-07-29 NOTE — TELEPHONE ENCOUNTER
Patient called to schedule a repeat EGD. Patient scheduled on 8/20/20.            EGD Prep Instructions    [facility]  [address] in [facility] [phone]  Endoscopy Lab [phone]    You are scheduled for an EGD with Dr. Sena on 8/20/20 at [facility].  You will enter through the [facility] Entrance and check in at Same Day Surgery.    Nothing to eat or drink after midnight before the procedure.  You MAY brush your teeth.    You MAY take your blood pressure, heart, and seizure medication on the morning of the procedure, with a SIP of water.  Hold ALL other medications until after the procedure.    If you are on blood thinners THAT YOU HAVE BEEN INSTRUCTED TO HOLD BY YOUR DOCTOR FOR THIS PROCEDURE, then do NOT take this the morning of your EGD.  Do NOT stop these medications on your own, they must be approved to be held by your doctor.  Your EGD can NOT be done if you are on these medications.  Examples of blood thinners include: Coumadin, Aggrenox, Plavix, Pradaxa, Reapro, Pletal, Xarelto, Ticagrelor, Brilinta, Eliquis, and high dose aspirin (325 mg).  You do not have to stop baby aspirin 81 mg.    You will receive a call 2-3 days before your EGD to tell you the time to arrive.  If you have not received a call by the day before your procedure, call the Endoscopy Lab at [phone].

## 2020-07-29 NOTE — TELEPHONE ENCOUNTER
Spoke with pt. Re: appt. Advised dr. Fuchs does not have availability this week. Pt. States she will keep appt. With dr. Fuentes on 8/6

## 2020-07-30 LAB
ANA SER QL IF: NORMAL
MITOCHONDRIA AB TITR SER IF: NORMAL {TITER}
SMOOTH MUSCLE AB TITR SER IF: ABNORMAL {TITER}

## 2020-07-31 LAB
HCV RNA SERPL NAA+PROBE-LOG IU: 5.44 LOG (10) IU/ML
HCV RNA SERPL QL NAA+PROBE: DETECTED IU/ML
HCV RNA SPEC NAA+PROBE-ACNC: ABNORMAL IU/ML

## 2020-08-03 ENCOUNTER — TELEPHONE (OUTPATIENT)
Dept: HEPATOLOGY | Facility: CLINIC | Age: 55
End: 2020-08-03

## 2020-08-03 DIAGNOSIS — Z23 NEED FOR VACCINATION AGAINST HEPATITIS B VIRUS: Primary | ICD-10-CM

## 2020-08-03 LAB — PHOSPHATIDYLETHANOL (PETH): 138 NG/ML

## 2020-08-03 NOTE — TELEPHONE ENCOUNTER
----- Message from Marcie Aleman NP sent at 8/3/2020 12:08 PM CDT -----  Good Afternoon Codie,    I saw patient in clinic last month for HCV and Alcohol related Cirrhosis. She has GT 3, and is treatment naive. Please contact her to schedule an appointment with Gris to discuss treatment.    Thank You,     Marcie

## 2020-08-03 NOTE — TELEPHONE ENCOUNTER
I spoke with patient.  Virtual visit with PA Scheuermann scheduled 8/13/20; reminder notice mailed.

## 2020-08-10 ENCOUNTER — PATIENT OUTREACH (OUTPATIENT)
Dept: ADMINISTRATIVE | Facility: OTHER | Age: 55
End: 2020-08-10

## 2020-08-10 ENCOUNTER — TELEPHONE (OUTPATIENT)
Dept: ORTHOPEDICS | Facility: CLINIC | Age: 55
End: 2020-08-10

## 2020-08-10 NOTE — TELEPHONE ENCOUNTER
Advised pt per [facility] COVID policy visitors are not allowed at outpatient appointment. Pt states she will make other arrangements for her grandson. Advised pt that if she needs to reschedule her appointment we are happy to work with her on a time that better fits her schedule.     Jessica Robles  Orthopedic Clinical Assistant to Dr. Marcie Fuentes

## 2020-08-10 NOTE — TELEPHONE ENCOUNTER
----- Message from Mariam Davies sent at 8/10/2020 12:14 PM CDT -----  Regarding: Question regarding Appointment  Type  Questions Regarding  Appointment Request     Name of Caller:Patient has appointment for 8/12/2020 patient need to speak with nurse.   Patient states want to know if she can bring her 5 year old grandson.  When is the first available appointment?  Symptoms:  Best Call Back Number:[phone]  Additional Information:

## 2020-08-11 NOTE — PROGRESS NOTES
Care Everywhere: updated  Immunization: updated  Health Maintenance: updated  Media Review: reviewed for outside mammogram report  Legacy Review:   Order placed:   Upcoming appts:

## 2020-08-12 NOTE — PROGRESS NOTES
Subjective:      Patient ID: Danika Voss is a 54 y.o. female.    Chief Complaint: No chief complaint on file.      HPI  ([place])    She fell on 7/11/20 injuring the right shoulder. Now with constant aching in right shoulder that is worse with overhead motions and at night. She is left hand dominant. No alleviating factors. She rates her pain as an 8 on a scale of 1-10. As above, pain is aching in nature. She has limited ROM of the arm and cannot lift anything due to pain. No numbness or tingling in right arm.     She is on oxycodone 10mg for chronic pain from outside pain management ([facility]). No PT, injections, or surgery on her shoulder. No previous shoulder issues.       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    MRSA, Peptostreptococcus    NSAID induced gastritis 9/8/2015    Obesity     Parkinson disease 11/17/2015    Venous ulcer of left leg 9/4/2019         Current Outpatient Medications:     betamethasone valerate 0.1% (VALISONE) 0.1 % Crea, Apply topically to the affected area(s) twice daily, Disp: 15 g, Rfl: 0    carvediloL (COREG) 12.5 MG tablet, Take 1 tablet (12.5 mg total) by mouth 2 (two) times daily., Disp: 180 tablet, Rfl: 1    diltiaZEM (CARDIZEM CD) 240 MG 24 hr capsule, Take 1 capsule (240 mg total) by mouth once daily., Disp: 90 capsule, Rfl: 1    DULoxetine (CYMBALTA) 60 MG capsule, TAKE 1 CAPSULE BY MOUTH EVERY DAY, Disp: 90 capsule, Rfl: 2    gabapentin (NEURONTIN) 600 MG tablet, TAKE 1 TABLET (600 MG TOTAL) BY MOUTH 4 (FOUR) TIMES DAILY., Disp: 360 tablet, Rfl: 1    levothyroxine (SYNTHROID) 100 MCG tablet, TAKE 1 TABLET BY MOUTH EVERY DAY, Disp: 90 tablet, Rfl: 1    oxyCODONE (OXY-IR) 5 mg Cap, Take 5 mg by mouth every 4  (four) hours as needed for Pain., Disp: , Rfl:     pantoprazole (PROTONIX) 40 MG tablet, TAKE 1 TABLET BY MOUTH EVERY DAY, Disp: 30 tablet, Rfl: 2    traZODone (DESYREL) 50 MG tablet, TAKE 1 TABLET (50 MG TOTAL) BY MOUTH NIGHTLY AS NEEDED FOR INSOMNIA., Disp: 90 tablet, Rfl: 0    valsartan-hydrochlorothiazide (DIOVAN-HCT) 160-12.5 mg per tablet, Take 1 tablet by mouth once daily., Disp: 90 tablet, Rfl: 1    albuterol 90 mcg/actuation inhaler, Inhale 2 puffs into the lungs every 6 (six) hours as needed for Wheezing. Rescue, Disp: 18 g, Rfl: 1    amitriptyline (ELAVIL) 25 MG tablet, Take 25 mg by mouth once daily., Disp: , Rfl: 1    hydrOXYzine (ATARAX) 50 MG tablet, Take 1 tablet (50 mg total) by mouth 4 (four) times daily as needed for Itching. (Patient not taking: Reported on 8/13/2020), Disp: 30 tablet, Rfl: 1    hydrOXYzine (ATARAX) 50 MG tablet, Take 1 tablet (50 mg total) by mouth every 6 (six) hours as needed for itching (Patient not taking: Reported on 8/13/2020), Disp: 30 tablet, Rfl: 0    oxyCODONE (ROXICODONE) 10 mg Tab immediate release tablet, Take 10 mg by mouth 2 (two) times daily., Disp: , Rfl: 0    oxyCODONE-acetaminophen (PERCOCET)  mg per tablet, Take 1 tablet by mouth every 6 (six) hours as needed for Pain. 28 tabs, Disp: , Rfl:     oxyCODONE-acetaminophen (PERCOCET)  mg per tablet, TAKE 1 TABLET BY MOUTH EVERY 6 HOURS AS NEEDED FOR PAIN (Patient not taking: Reported on 8/13/2020), Disp: 8 tablet, Rfl: 0    Review of patient's allergies indicates:   Allergen Reactions    Fentanyl Other (See Comments)     Fentanyl patches burn skin  Burns her skin     Nsaids (non-steroidal anti-inflammatory drug) Other (See Comments)     gastritis  Stomach ulcers     Ketorolac Nausea And Vomiting and Rash    Lisinopril Other (See Comments)     cough  cough       Review of Systems   Constitution: Negative for chills, fever, night sweats and weight gain.   Gastrointestinal: Negative for  bowel incontinence, nausea and vomiting.   Genitourinary: Negative for bladder incontinence.   Neurological: Negative for disturbances in coordination and loss of balance.          Objective:        BP (!) 150/100   Pulse 74   Resp 20   Wt (!) 150.1 kg (331 lb)   BMI 47.49 kg/m²     Ortho/SPM Exam    Observation:    CERVICAL SPINE  No posterior cervical tenderness. Reasonable ROM of cervical spine without pain.     BILATERAL SHOULDERS:  No ecchymosis, edema, or erythema throughout the shoulder girdle.  No sternal, clavicular, or acromial deformities bilaterally.  No atrophy of the pectorals, deltoids, supraspinatus, infraspinatus, or biceps bilaterally.  No asymmetry of shoulders bilaterally.    ROM OF BOTH SHOULDERS:  Active flexion to 160° on left and 90° on right.   Active abduction to 160° on left and 80° on right.      Strength Testing of both UEs:  Deltoid - 5/5 on left and 5/5 on right  Biceps - 5/5 on left and 5/5 on right  Triceps - 5/5 on left and 5/5 on right  Wrist extension - 5/5 on left and 5/5 on right  Wrist flexion - 5/5 on left and 5/5 on right   - 5/5 on left and 5/5 on right    RIGHT SHOULDER EXAM:  Tenderness:  No tenderness at the SC or AC joint  No tenderness over the clavicle   No tenderness over biceps tendon or bicipital groove  Mild tenderness over subacromial space    Special Tests: exam is limited due to limited ROM  Empty can test - positive with weakness  Full can test - positive with weakness  Resisted internal rotation - negative  Resisted external rotation - negative    Neer's test - positive  Hawkin's-Robert test - positive    Speed's test - negative  Yergason's test - negative    Sulcus sign - none  AP load and shift laxity - none    LEFT SHOULDER EXAM:  Tenderness:  No tenderness at the SC or AC joint  No tenderness over the clavicle   No tenderness over biceps tendon or bicipital groove  No tenderness over subacromial space    Special Tests:  Empty can test -  negative  Full can test - negative  Resisted internal rotation - negative  Resisted external rotation - negative    Neer's test - negative  Hawkin's-Robert test - negative    Speed's test - negative  Yergason's test - negative    Sulcus sign - none  AP load and shift laxity - none    Neurovascular Exam Bilateral UEs:  2+ radial pulses BL  Sensation intact to light touch in the distal median, radial, and ulnar nerve distributions bilaterally.  Capillary refill intact <2 seconds in all digits bilaterally      XRAY INTERPRETATION:   X-rays of right shoulder dated 7/21/20 are personally reviewed and show no fracture, dislocation, or bony erosion.         Assessment:       Encounter Diagnoses   Name Primary?    Acute pain of right shoulder Yes    Fall, initial encounter           Plan:       Diagnoses and all orders for this visit:    Acute pain of right shoulder  -     Ambulatory referral/consult to Orthopedics  -     Ambulatory referral/consult to Physical/Occupational Therapy; Future  -     Large Joint Aspiration/Injection    Fall, initial encounter        She fell on 7/11/20 injuring the right shoulder. Now with constant aching in right shoulder that is worse with overhead motions and at night. She is left hand dominant. Right shoulder XRs look good. Exam shows significant decreased ROM of right shoulder with pain. Treatment options reviewed with patient along with above right shoulder xrays. Following plan made:     - RIGHT shoulder injection done without complication. See procedure note.   - PT orders for right shoulder sent to [facility].  - No NSAIDs due to history of GI ulcers.   - Continue chronic oxycodone from pain management ([facility]).    - If no improvement with above, consider MRI of right shoulder.    Follow up in about 3 months (around 11/13/2020).

## 2020-08-13 ENCOUNTER — OFFICE VISIT (OUTPATIENT)
Dept: ORTHOPEDICS | Facility: CLINIC | Age: 55
End: 2020-08-13
Payer: MEDICARE

## 2020-08-13 VITALS
RESPIRATION RATE: 20 BRPM | HEART RATE: 74 BPM | DIASTOLIC BLOOD PRESSURE: 100 MMHG | BODY MASS INDEX: 47.49 KG/M2 | WEIGHT: 293 LBS | SYSTOLIC BLOOD PRESSURE: 150 MMHG
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
## 2020-08-13 DIAGNOSIS — M25.511 ACUTE PAIN OF RIGHT SHOULDER: Primary | ICD-10-CM

## 2020-08-13 PROCEDURE — 3077F PR MOST RECENT SYSTOLIC BLOOD PRESSURE >= 140 MM HG: ICD-10-PCS | Mod: HCNC,CPTII,S$GLB, | Performed by: PHYSICIAN ASSISTANT

## 2020-08-13 PROCEDURE — 3077F SYST BP >= 140 MM HG: CPT | Mod: HCNC,CPTII,S$GLB, | Performed by: PHYSICIAN ASSISTANT

## 2020-08-13 PROCEDURE — 3080F DIAST BP >= 90 MM HG: CPT | Mod: HCNC,CPTII,S$GLB, | Performed by: PHYSICIAN ASSISTANT

## 2020-08-13 PROCEDURE — 99214 OFFICE O/P EST MOD 30 MIN: CPT | Mod: 25,HCNC,S$GLB, | Performed by: PHYSICIAN ASSISTANT

## 2020-08-13 PROCEDURE — 20610 PR DRAIN/INJECT LARGE JOINT/BURSA: ICD-10-PCS | Mod: HCNC,RT,S$GLB, | Performed by: PHYSICIAN ASSISTANT

## 2020-08-13 PROCEDURE — 3080F PR MOST RECENT DIASTOLIC BLOOD PRESSURE >= 90 MM HG: ICD-10-PCS | Mod: HCNC,CPTII,S$GLB, | Performed by: PHYSICIAN ASSISTANT

## 2020-08-13 PROCEDURE — 3008F BODY MASS INDEX DOCD: CPT | Mod: HCNC,CPTII,S$GLB, | Performed by: PHYSICIAN ASSISTANT

## 2020-08-13 PROCEDURE — 3008F PR BODY MASS INDEX (BMI) DOCUMENTED: ICD-10-PCS | Mod: HCNC,CPTII,S$GLB, | Performed by: PHYSICIAN ASSISTANT

## 2020-08-13 PROCEDURE — 99214 PR OFFICE/OUTPT VISIT, EST, LEVL IV, 30-39 MIN: ICD-10-PCS | Mod: 25,HCNC,S$GLB, | Performed by: PHYSICIAN ASSISTANT

## 2020-08-13 PROCEDURE — 99999 PR PBB SHADOW E&M-EST. PATIENT-LVL V: CPT | Mod: PBBFAC,HCNC,, | Performed by: PHYSICIAN ASSISTANT

## 2020-08-13 PROCEDURE — 99999 PR PBB SHADOW E&M-EST. PATIENT-LVL V: ICD-10-PCS | Mod: PBBFAC,HCNC,, | Performed by: PHYSICIAN ASSISTANT

## 2020-08-13 PROCEDURE — 20610 DRAIN/INJ JOINT/BURSA W/O US: CPT | Mod: HCNC,RT,S$GLB, | Performed by: PHYSICIAN ASSISTANT

## 2020-08-13 RX ORDER — OXYCODONE HYDROCHLORIDE 5 MG/1
5 CAPSULE ORAL EVERY 4 HOURS PRN
COMMUNITY
End: 2020-08-19 | Stop reason: ALTCHOICE

## 2020-08-13 RX ORDER — TRIAMCINOLONE ACETONIDE 40 MG/ML
40 INJECTION, SUSPENSION INTRA-ARTICULAR; INTRAMUSCULAR
Status: DISCONTINUED | OUTPATIENT
Start: 2020-08-13 | End: 2020-08-13 | Stop reason: HOSPADM

## 2020-08-13 RX ADMIN — TRIAMCINOLONE ACETONIDE 40 MG: 40 INJECTION, SUSPENSION INTRA-ARTICULAR; INTRAMUSCULAR at 08:08

## 2020-08-13 NOTE — PROCEDURES
Large Joint Aspiration/Injection    Date/Time: 8/13/2020 8:30 AM  Performed by: Leola Isidro PA-C  Authorized by: Leola Isidro PA-C     Consent Done?:  Yes (Verbal)  Timeout: prior to procedure the correct patient, procedure, and site was verified    Medications:  40 mg triamcinolone acetonide 40 mg/mL     PROCEDURE NOTE FOR RIGHT SHOULDER SUBACROMIAL BURSA INJECTION:    I have explained the risks, benefits, and alternatives of the procedure in detail.  The patient voices understanding and all questions have been answered.  The patient agrees to proceed as planned.    After a sterile prep of the skin using chloraprep one step, the area was sprayed with local topical anesthetic and then cleaned with alcohol. A subacromial injection was performed in the RIGHT shoulder with a combination of 1 cc of 1% lidocaine and 40mg triamcinolone.     The patient is cautioned that immediate relief of pain is secondary to the local anesthetic and will be temporary. After the anesthetic wears off there may be a increase in pain that may last for a few hours or a few days and they should use ice to help alleviate this this pain.     If patient is diabetic, post injection elevation of blood sugar was discussed. Patient is to check blood sugar regularly and call PCP with any issues.     Patient tolerated the procedure well.

## 2020-08-13 NOTE — PATIENT INSTRUCTIONS
It was nice to meet you today! I am sorry that you are hurting so much.     Your right shoulder xrays look good. You likely have some bursitis (inflammation) in the shoulder and this is likely what is causing your pain.     The injection that I did today should give you some good relief of pain. It is normal to have some increased soreness over the next few days after an injection. Put ice on it and elevate. This will get better.    I sent physical therapy orders to [facility]. They should call you to set up, but if not you can call [phone].     If no improvement with above, we can consider an MRI of your right shoulder.     I will see you back in 3 months, but please stay in touch and call me if you need anything. You can also send me a message in [facility].     Leola   [phone]

## 2020-08-17 ENCOUNTER — LAB VISIT (OUTPATIENT)
Dept: FAMILY MEDICINE | Facility: CLINIC | Age: 55
End: 2020-08-17
Payer: MEDICARE

## 2020-08-17 DIAGNOSIS — Z01.818 PREOP EXAMINATION: ICD-10-CM

## 2020-08-17 PROCEDURE — U0003 INFECTIOUS AGENT DETECTION BY NUCLEIC ACID (DNA OR RNA); SEVERE ACUTE RESPIRATORY SYNDROME CORONAVIRUS 2 (SARS-COV-2) (CORONAVIRUS DISEASE [COVID-19]), AMPLIFIED PROBE TECHNIQUE, MAKING USE OF HIGH THROUGHPUT TECHNOLOGIES AS DESCRIBED BY CMS-2020-01-R: HCPCS | Mod: HCNC

## 2020-08-18 LAB — SARS-COV-2 RNA RESP QL NAA+PROBE: NOT DETECTED
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
## 2020-08-19 ENCOUNTER — OUTPATIENT CASE MANAGEMENT (OUTPATIENT)
Dept: ADMINISTRATIVE | Facility: OTHER | Age: 55
End: 2020-08-19

## 2020-08-19 ENCOUNTER — OFFICE VISIT (OUTPATIENT)
Dept: FAMILY MEDICINE | Facility: CLINIC | Age: 55
End: 2020-08-19
Payer: MEDICARE

## 2020-08-19 VITALS
HEART RATE: 75 BPM | TEMPERATURE: 99 F | OXYGEN SATURATION: 97 % | HEIGHT: 70 IN | DIASTOLIC BLOOD PRESSURE: 78 MMHG | BODY MASS INDEX: 41.95 KG/M2 | RESPIRATION RATE: 18 BRPM | WEIGHT: 293 LBS | SYSTOLIC BLOOD PRESSURE: 138 MMHG

## 2020-08-19 DIAGNOSIS — I87.2 VENOUS STASIS DERMATITIS OF BOTH LOWER EXTREMITIES: ICD-10-CM

## 2020-08-19 DIAGNOSIS — F11.20 UNCOMPLICATED OPIOID DEPENDENCE: ICD-10-CM

## 2020-08-19 DIAGNOSIS — G89.4 CHRONIC PAIN DISORDER: Chronic | ICD-10-CM

## 2020-08-19 DIAGNOSIS — Z12.31 ENCOUNTER FOR SCREENING MAMMOGRAM FOR BREAST CANCER: ICD-10-CM

## 2020-08-19 DIAGNOSIS — E66.01 MORBID OBESITY WITH BMI OF 40.0-44.9, ADULT: Chronic | ICD-10-CM

## 2020-08-19 DIAGNOSIS — F33.1 MODERATE EPISODE OF RECURRENT MAJOR DEPRESSIVE DISORDER: Primary | ICD-10-CM

## 2020-08-19 DIAGNOSIS — I10 ESSENTIAL HYPERTENSION: Chronic | ICD-10-CM

## 2020-08-19 DIAGNOSIS — F41.1 GENERALIZED ANXIETY DISORDER: ICD-10-CM

## 2020-08-19 DIAGNOSIS — E03.4 HYPOTHYROIDISM DUE TO ACQUIRED ATROPHY OF THYROID: Chronic | ICD-10-CM

## 2020-08-19 PROCEDURE — 3075F PR MOST RECENT SYSTOLIC BLOOD PRESS GE 130-139MM HG: ICD-10-PCS | Mod: HCNC,CPTII,S$GLB, | Performed by: FAMILY MEDICINE

## 2020-08-19 PROCEDURE — 99999 PR PBB SHADOW E&M-EST. PATIENT-LVL V: ICD-10-PCS | Mod: PBBFAC,HCNC,, | Performed by: FAMILY MEDICINE

## 2020-08-19 PROCEDURE — 3008F BODY MASS INDEX DOCD: CPT | Mod: HCNC,CPTII,S$GLB, | Performed by: FAMILY MEDICINE

## 2020-08-19 PROCEDURE — 99499 UNLISTED E&M SERVICE: CPT | Mod: HCNC,S$GLB,, | Performed by: FAMILY MEDICINE

## 2020-08-19 PROCEDURE — 99214 OFFICE O/P EST MOD 30 MIN: CPT | Mod: HCNC,S$GLB,, | Performed by: FAMILY MEDICINE

## 2020-08-19 PROCEDURE — 3078F PR MOST RECENT DIASTOLIC BLOOD PRESSURE < 80 MM HG: ICD-10-PCS | Mod: HCNC,CPTII,S$GLB, | Performed by: FAMILY MEDICINE

## 2020-08-19 PROCEDURE — 3078F DIAST BP <80 MM HG: CPT | Mod: HCNC,CPTII,S$GLB, | Performed by: FAMILY MEDICINE

## 2020-08-19 PROCEDURE — 99214 PR OFFICE/OUTPT VISIT, EST, LEVL IV, 30-39 MIN: ICD-10-PCS | Mod: HCNC,S$GLB,, | Performed by: FAMILY MEDICINE

## 2020-08-19 PROCEDURE — 99999 PR PBB SHADOW E&M-EST. PATIENT-LVL V: CPT | Mod: PBBFAC,HCNC,, | Performed by: FAMILY MEDICINE

## 2020-08-19 PROCEDURE — 3075F SYST BP GE 130 - 139MM HG: CPT | Mod: HCNC,CPTII,S$GLB, | Performed by: FAMILY MEDICINE

## 2020-08-19 PROCEDURE — 3008F PR BODY MASS INDEX (BMI) DOCUMENTED: ICD-10-PCS | Mod: HCNC,CPTII,S$GLB, | Performed by: FAMILY MEDICINE

## 2020-08-19 PROCEDURE — 99499 RISK ADDL DX/OHS AUDIT: ICD-10-PCS | Mod: HCNC,S$GLB,, | Performed by: FAMILY MEDICINE

## 2020-08-19 RX ORDER — HYDROXYZINE HYDROCHLORIDE 50 MG/1
50 TABLET, FILM COATED ORAL 3 TIMES DAILY PRN
Qty: 90 TABLET | Refills: 2 | Status: ON HOLD | OUTPATIENT
Start: 2020-08-19 | End: 2020-10-22 | Stop reason: HOSPADM

## 2020-08-19 RX ORDER — DILTIAZEM HYDROCHLORIDE 240 MG/1
240 CAPSULE, COATED, EXTENDED RELEASE ORAL DAILY
Qty: 90 CAPSULE | Refills: 3 | Status: SHIPPED | OUTPATIENT
Start: 2020-08-19

## 2020-08-19 RX ORDER — MIRTAZAPINE 7.5 MG/1
7.5 TABLET, FILM COATED ORAL NIGHTLY
Qty: 30 TABLET | Refills: 1 | Status: SHIPPED | OUTPATIENT
Start: 2020-08-19 | End: 2020-09-11

## 2020-08-19 RX ORDER — BETAMETHASONE VALERATE 1.2 MG/G
CREAM TOPICAL
Qty: 45 G | Refills: 5 | Status: ON HOLD | OUTPATIENT
Start: 2020-08-19 | End: 2020-10-22 | Stop reason: HOSPADM

## 2020-08-19 RX ORDER — AMITRIPTYLINE HYDROCHLORIDE 25 MG/1
25 TABLET, FILM COATED ORAL DAILY
Qty: 90 TABLET | Refills: 0 | Status: CANCELLED | OUTPATIENT
Start: 2020-08-19 | End: 2020-11-17

## 2020-08-19 RX ORDER — ALBUTEROL SULFATE 90 UG/1
2 AEROSOL, METERED RESPIRATORY (INHALATION) EVERY 6 HOURS PRN
Qty: 18 G | Refills: 1 | Status: SHIPPED | OUTPATIENT
Start: 2020-08-19 | End: 2020-08-19

## 2020-08-19 RX ORDER — VALSARTAN AND HYDROCHLOROTHIAZIDE 160; 12.5 MG/1; MG/1
1 TABLET, FILM COATED ORAL DAILY
Qty: 90 TABLET | Refills: 3 | Status: SHIPPED | OUTPATIENT
Start: 2020-08-19

## 2020-08-19 NOTE — ASSESSMENT & PLAN NOTE
- high risk due to chronic pain and opioids  - denies suicidal thoughts  - recommend see Psychiatry none that she has new insurance (was previously on Medicaid and was unable to find a provider)  - continue Cymbalta 60 mg daily and add Remeron 7.5 mg nightly  - referral to Psychiatry  - counseling regarding new medication including expected results, potential side effects, and appropriate use. Questions elicited and answered  - questions elicited and answered  - encouraged to patient to notify me of any questions or concerns

## 2020-08-19 NOTE — ASSESSMENT & PLAN NOTE
Lab Results   Component Value Date    TSH 2.340 06/18/2020     - well controlled  - continue current medication

## 2020-08-19 NOTE — LETTER
August 21, 2020    Danika Voss  [address] [place] [address]             [facility] [address] [place] [address] Dear Ms Voss    My name is Amber and I work with [facility] Outpatient Case Management Department. We received a referral to call you to discuss your medical history.These services are free of charge and are offered to [facility] patients who have recently been discharged from any of our facilities or who have complex medical conditions that may require the skill of a nurse to assist with management.             I attempted to reach you by telephone, but I was unsuccessful. Please call our department so that we can go over some questions with you regarding your health.    The Outpatient Case Management Department can be reached at [phone] from 8:00AM to 4:30 PM on Monday thru Friday. [facility] also has a program where a nurse is available 24/7 to answer questions or provide medical advice, their number is [phone].    Thank You,      Amber Venegas RN  Outpatient Care Management  [phone]

## 2020-08-20 PROBLEM — K22.70 BARRETT'S ESOPHAGUS: Status: ACTIVE | Noted: 2020-08-20

## 2020-08-27 ENCOUNTER — TELEPHONE (OUTPATIENT)
Dept: HEPATOLOGY | Facility: CLINIC | Age: 55
End: 2020-08-27

## 2020-09-01 NOTE — PROGRESS NOTES
Outpatient Care Management  Patient Not Qualified    Patient: Danika Voss  MRN:  [id number]  Date of Service:  9/1/2020  Completed by:  Amber Venegas RN    Chief Complaint   Patient presents with    OPCM Chart Review     8/19/20    OPCM RN First Assessment Attempt     8/20/20    OPCM RN Second Assessment Attempt     8/21/20    OPCM RN Third Assessment Attempt     9/1/20    Case Closure     9/1/30       Patient Summary     Program:  OPCM High Risk  Qualification Status:  No  Reason Not Qualified:  Unable to reach

## 2020-09-11 DIAGNOSIS — F33.1 MODERATE EPISODE OF RECURRENT MAJOR DEPRESSIVE DISORDER: ICD-10-CM

## 2020-09-11 RX ORDER — MIRTAZAPINE 7.5 MG/1
7.5 TABLET, FILM COATED ORAL NIGHTLY
Qty: 30 TABLET | Refills: 3 | Status: ON HOLD | OUTPATIENT
Start: 2020-09-11 | End: 2020-10-22 | Stop reason: HOSPADM

## 2020-09-29 ENCOUNTER — PATIENT MESSAGE (OUTPATIENT)
Dept: OTHER | Facility: OTHER | Age: 55
End: 2020-09-29

## 2020-10-06 ENCOUNTER — TELEPHONE (OUTPATIENT)
Dept: FAMILY MEDICINE | Facility: CLINIC | Age: 55
End: 2020-10-06

## 2020-10-06 NOTE — TELEPHONE ENCOUNTER
"Called and spoke with patient. Feeling increasingly depressed and tearful. Denies thoughts of self harm or harm to others but admits to "wanting to not be here." Passive thoughts. No weapons in the house. Son lives close and she reports that she will notify him. Recommend increase Remeron to 15 mg at bedtime. Recommend be seen and she will see me tomorrow 10/7/2020 at 10:30 AM. Recommend to ER if any thoughts of self harm. She is agreeable with plan.   Dr. Rosario Lewis D.O.   Family Medicine    "

## 2020-10-06 NOTE — TELEPHONE ENCOUNTER
Spoke to pt on phone. Pt states she is very depressed and has been for the last 3 days. Pt states she does not know what is causing her depression but she feels like no one cares and she is always in pain. Pt states she would like to speak to Dr Lewis to help her feel better.

## 2020-10-06 NOTE — TELEPHONE ENCOUNTER
----- Message from Julieta Cordova sent at 10/6/2020  1:44 PM CDT -----  Contact: Pt  Pt called stating she's very depressed and have been for the past 3 day. Pt stated this very overwhelming and has never felt this way before. Pt asked for a call back

## 2020-10-07 ENCOUNTER — TELEPHONE (OUTPATIENT)
Dept: FAMILY MEDICINE | Facility: CLINIC | Age: 55
End: 2020-10-07

## 2020-10-07 ENCOUNTER — OFFICE VISIT (OUTPATIENT)
Dept: FAMILY MEDICINE | Facility: CLINIC | Age: 55
End: 2020-10-07
Payer: MEDICARE

## 2020-10-07 DIAGNOSIS — F33.1 MODERATE EPISODE OF RECURRENT MAJOR DEPRESSIVE DISORDER: ICD-10-CM

## 2020-10-07 DIAGNOSIS — F41.1 GENERALIZED ANXIETY DISORDER: Primary | ICD-10-CM

## 2020-10-07 PROCEDURE — 99214 PR OFFICE/OUTPT VISIT, EST, LEVL IV, 30-39 MIN: ICD-10-PCS | Mod: HCNC,95,, | Performed by: FAMILY MEDICINE

## 2020-10-07 PROCEDURE — 99214 OFFICE O/P EST MOD 30 MIN: CPT | Mod: HCNC,95,, | Performed by: FAMILY MEDICINE

## 2020-10-07 RX ORDER — OXYCODONE HYDROCHLORIDE 10 MG/1
10 TABLET ORAL DAILY
COMMUNITY
Start: 2020-09-30 | End: 2020-12-10

## 2020-10-07 NOTE — ASSESSMENT & PLAN NOTE
- high risk due to chronic pain and opioids  - + suicidal thoughts  - recommend to ER and pt agreeable and son nearby to assist  - call and discussed with ER Dr. Huber to expect patient

## 2020-10-07 NOTE — TELEPHONE ENCOUNTER
----- Message from Katelyn Gomez sent at 10/7/2020  9:59 AM CDT -----  Contact: self [phone]  Patient is calling to reschedule her appointment because she said she don't have gas. Please call
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
## 2020-10-09 ENCOUNTER — TELEPHONE (OUTPATIENT)
Dept: ORTHOPEDICS | Facility: CLINIC | Age: 55
End: 2020-10-09

## 2020-10-09 NOTE — TELEPHONE ENCOUNTER
Spoke to patient, she had a fall and broke her ankle, when she was at the ER her hand wasn't bothering her, it is now swelling and painful, advised pt to go to the ER since the provider is gone for the day, patient jarett, I made her a follow up for her wrist with Leola Isidro next week and a follow up with Dr. Good for her ankle.

## 2020-10-09 NOTE — TELEPHONE ENCOUNTER
----- Message from Brittany Piña sent at 10/9/2020  9:46 AM CDT -----  Contact: SELF [phone]  Patient would like to speak with you about being seen today for a fall and has severe pain in her arm and wrist and already went to the ER. Please advise

## 2020-10-12 ENCOUNTER — PATIENT OUTREACH (OUTPATIENT)
Dept: ADMINISTRATIVE | Facility: OTHER | Age: 55
End: 2020-10-12

## 2020-10-12 ENCOUNTER — OFFICE VISIT (OUTPATIENT)
Dept: PODIATRY | Facility: CLINIC | Age: 55
End: 2020-10-12
Payer: MEDICARE

## 2020-10-12 VITALS
WEIGHT: 293 LBS | SYSTOLIC BLOOD PRESSURE: 120 MMHG | BODY MASS INDEX: 41.95 KG/M2 | RESPIRATION RATE: 16 BRPM | HEART RATE: 102 BPM | HEIGHT: 70 IN | DIASTOLIC BLOOD PRESSURE: 82 MMHG

## 2020-10-12 DIAGNOSIS — S92.515A CLOSED NONDISPLACED FRACTURE OF PROXIMAL PHALANX OF LESSER TOE OF LEFT FOOT, INITIAL ENCOUNTER: ICD-10-CM

## 2020-10-12 DIAGNOSIS — M79.671 RIGHT FOOT PAIN: ICD-10-CM

## 2020-10-12 DIAGNOSIS — S82.52XA CLOSED AVULSION FRACTURE OF MEDIAL MALLEOLUS OF LEFT TIBIA, INITIAL ENCOUNTER: Primary | ICD-10-CM

## 2020-10-12 DIAGNOSIS — M25.531 RIGHT WRIST PAIN: Primary | ICD-10-CM

## 2020-10-12 DIAGNOSIS — R58 ECCHYMOSIS: ICD-10-CM

## 2020-10-12 PROCEDURE — 3079F PR MOST RECENT DIASTOLIC BLOOD PRESSURE 80-89 MM HG: ICD-10-PCS | Mod: HCNC,CPTII,S$GLB, | Performed by: PODIATRIST

## 2020-10-12 PROCEDURE — 3074F PR MOST RECENT SYSTOLIC BLOOD PRESSURE < 130 MM HG: ICD-10-PCS | Mod: HCNC,CPTII,S$GLB, | Performed by: PODIATRIST

## 2020-10-12 PROCEDURE — 3008F BODY MASS INDEX DOCD: CPT | Mod: HCNC,CPTII,S$GLB, | Performed by: PODIATRIST

## 2020-10-12 PROCEDURE — 99999 PR PBB SHADOW E&M-EST. PATIENT-LVL IV: CPT | Mod: PBBFAC,HCNC,, | Performed by: PODIATRIST

## 2020-10-12 PROCEDURE — 99213 PR OFFICE/OUTPT VISIT, EST, LEVL III, 20-29 MIN: ICD-10-PCS | Mod: HCNC,S$GLB,, | Performed by: PODIATRIST

## 2020-10-12 PROCEDURE — 99999 PR PBB SHADOW E&M-EST. PATIENT-LVL IV: ICD-10-PCS | Mod: PBBFAC,HCNC,, | Performed by: PODIATRIST

## 2020-10-12 PROCEDURE — 99213 OFFICE O/P EST LOW 20 MIN: CPT | Mod: HCNC,S$GLB,, | Performed by: PODIATRIST

## 2020-10-12 PROCEDURE — 3008F PR BODY MASS INDEX (BMI) DOCUMENTED: ICD-10-PCS | Mod: HCNC,CPTII,S$GLB, | Performed by: PODIATRIST

## 2020-10-12 PROCEDURE — 3079F DIAST BP 80-89 MM HG: CPT | Mod: HCNC,CPTII,S$GLB, | Performed by: PODIATRIST

## 2020-10-12 PROCEDURE — 3074F SYST BP LT 130 MM HG: CPT | Mod: HCNC,CPTII,S$GLB, | Performed by: PODIATRIST

## 2020-10-12 RX ORDER — ALBUTEROL SULFATE 90 UG/1
2 AEROSOL, METERED RESPIRATORY (INHALATION) EVERY 6 HOURS PRN
COMMUNITY
Start: 2020-09-11
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
## 2020-10-12 NOTE — PROGRESS NOTES
Health Maintenance Due   Topic Date Due    Shingles Vaccine (1 of 2) 09/25/2014    Mammogram  03/06/2020    Influenza Vaccine (1) 08/01/2020     Updates were requested from care everywhere.  Chart was reviewed for overdue Proactive [facility] Encounters ([facility]) topics (CRS, Breast Cancer Screening, Eye exam)  Health Maintenance has been updated.  LINKS immunization registry triggered.  Immunizations were reconciled.

## 2020-10-12 NOTE — PROGRESS NOTES
Subjective:      Patient ID: Danika Voss is a 55 y.o. female.    Chief Complaint: No chief complaint on file.      HPI  (French)    I last saw her on 8/13/20 for her right shoulder. Given injection and sent to PT. She was not able to go to PT.     Seen in ED on 10/8/20 after a fall in her driveway- diagnosed with left ankle fracture and left toe fractures. Seeing podiatry for this.     Seen back in ED on 10/9/20 with right wrist pain. Question of distal radial shaft fracture on XR. Here for follow up.     She complains of right shoulder and wrist pain. She is left hand dominant. No improvement with previous right shoulder injection. She did not go to PT. Right shoulder has been worse since fall- she can barely lift the arm. Also with right radial sided wrist pain. Worse with using the wrist. She rates her pain as an 8 on a scale of 1-10. No alleviating factors. She also has chronic swelling of left leg- is seeing Dr. Good as above for ankle/toe fractures.       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    MRSA, Peptostreptococcus    NSAID induced gastritis 9/8/2015    Obesity     Parkinson disease 11/17/2015    Venous ulcer of left leg 9/4/2019         Current Outpatient Medications:     albuterol (PROVENTIL/VENTOLIN HFA) 90 mcg/actuation inhaler, Inhale 2 puffs into the lungs every 6 (six) hours as needed., Disp: , Rfl:     betamethasone valerate 0.1% (VALISONE) 0.1 % Crea, Apply topically to the affected area(s) twice daily (Patient not taking: Reported on 10/7/2020), Disp: 45 g, Rfl: 5    carvediloL (COREG) 12.5 MG tablet, Take 1 tablet (12.5 mg total) by mouth 2 (two) times daily., Disp: 180 tablet, Rfl: 1    diltiaZEM (CARDIZEM CD) 240 MG 24 hr  capsule, Take 1 capsule (240 mg total) by mouth once daily., Disp: 90 capsule, Rfl: 3    DULoxetine (CYMBALTA) 60 MG capsule, TAKE 1 CAPSULE BY MOUTH EVERY DAY, Disp: 90 capsule, Rfl: 2    gabapentin (NEURONTIN) 600 MG tablet, TAKE 1 TABLET (600 MG TOTAL) BY MOUTH 4 (FOUR) TIMES DAILY., Disp: 360 tablet, Rfl: 1    hydrOXYzine (ATARAX) 50 MG tablet, Take 1 tablet (50 mg total) by mouth 3 (three) times daily as needed for Itching., Disp: 90 tablet, Rfl: 2    levothyroxine (SYNTHROID) 100 MCG tablet, TAKE 1 TABLET BY MOUTH EVERY DAY, Disp: 90 tablet, Rfl: 1    mirtazapine (REMERON) 7.5 MG Tab, TAKE 1 TABLET (7.5 MG TOTAL) BY MOUTH EVERY EVENING. (Patient taking differently: Take 15 mg by mouth every evening. ), Disp: 30 tablet, Rfl: 3    oxyCODONE (ROXICODONE) 10 mg Tab immediate release tablet, Take 10 mg by mouth once daily., Disp: , Rfl:     pantoprazole (PROTONIX) 40 MG tablet, TAKE 1 TABLET BY MOUTH EVERY DAY (Patient taking differently: Take 40 mg by mouth daily as needed. ), Disp: 30 tablet, Rfl: 2    traZODone (DESYREL) 50 MG tablet, TAKE 1 TABLET (50 MG TOTAL) BY MOUTH NIGHTLY AS NEEDED FOR INSOMNIA. (Patient not taking: Reported on 10/12/2020), Disp: 90 tablet, Rfl: 1    valsartan-hydrochlorothiazide (DIOVAN-HCT) 160-12.5 mg per tablet, Take 1 tablet by mouth once daily. (Patient not taking: Reported on 10/12/2020), Disp: 90 tablet, Rfl: 3    Review of patient's allergies indicates:   Allergen Reactions    Fentanyl Other (See Comments)     Fentanyl patches burn skin  Burns her skin     Nsaids (non-steroidal anti-inflammatory drug) Other (See Comments)     gastritis  Stomach ulcers     Ketorolac Nausea And Vomiting and Rash    Lisinopril Other (See Comments)     cough  cough       Review of Systems   Constitution: Negative for chills, fever, night sweats and weight gain.   Gastrointestinal: Negative for bowel incontinence, nausea and vomiting.   Genitourinary: Negative for bladder incontinence.    Neurological: Negative for disturbances in coordination and loss of balance.         Objective:        There were no vitals taken for this visit.    Ortho/SPM Exam    RIGHT Hand/Wrist Examination:    Observation/Inspection:  Swelling  none    Deformity  none  Discoloration  none     Scars   none    Atrophy  none    HAND/WRIST EXAMINATION:  Finkelstein's Test   positive  WHAT Test    positive  Snuff box tenderness   Neg  Hook of Hamate Tenderness  Neg  CMC grind    Neg    Neurovascular Exam:  Digits WWP, brisk CR < 3s throughout  NVI motor/LTS to M/R/U nerves, radial pulse 2+    ROM hand/elbow full, painless    Reasonable ROM of wrist with pain. She has tenderness along radial aspect of wrist. She is able to make a fist and has full extension of fingers.       LEFT Hand/Wrist Examination:    Observation/Inspection:  Swelling  none    Deformity  none  Discoloration  none     Scars   none    Atrophy  none    HAND/WRIST EXAMINATION:  Finkelstein's Test   Neg  WHAT Test    Neg  Snuff box tenderness   Neg  Hook of Hamate Tenderness  Neg  CMC grind    Neg    Neurovascular Exam:  Digits WWP, brisk CR < 3s throughout  NVI motor/LTS to M/R/U nerves, radial pulse 2+    ROM hand/wrist/elbow full, painless    RIGHT SHOULDER EXAM:  She has diffuse tenderness of shoulder/proximal humerus.     Limited ROM of right shoulder with active flexion to 75 degrees and active abduction to 60 degrees.      Special Tests: exam is limited due to limited ROM  Empty can test - positive with weakness  Full can test - positive with weakness  Resisted internal rotation - negative  Resisted external rotation - negative     Neer's test - positive  Hawkin's-Robert test - positive     Speed's test - negative  Yergason's test - negative     Sulcus sign - none  AP load and shift laxity - none      XRAY INTERPRETATION:   X-rays of right wrist dated 10/13/20 are personally reviewed and show no fracture, dislocation, or bony erosion.     X-rays of right  wrist dated 10/9/20 are personally reviewed and show slight cortical bulging along at the distal radial shaft per radiology.         Assessment:       Encounter Diagnoses   Name Primary?    Fall, initial encounter Yes    Right wrist pain     Right shoulder pain, unspecified chronicity           Plan:       Diagnoses and all orders for this visit:    Fall, initial encounter  -     X-ray Shoulder 2 or More Views Right; Future    Right wrist pain    Right shoulder pain, unspecified chronicity  -     X-ray Shoulder 2 or More Views Right; Future        Fall in her driveway on 10/8/20- diagnosed with left ankle fracture and left toe fractures. Seeing podiatry for this. Also with worsening right shoulder pain (no improvement with injection, did not go to PT) and right wrist pain. No fracture seen on wrist XRs. She has diffuse pain from fall. Treatment options reviewed with patient along with above right wrist xrays. Following plan made:     - XRs of right shoulder on her way out. Will call with results.   - If no fracture on right shoulder XR, consider MRI to evaluate for rotator cuff tear.   - Continue to follow with Dr. Good for left ankle/toes.   - Right wrist brace for comfort for next 1-2 weeks (has from ED). Remove for ROM. May have component of dequervain's- brace should help this as well.   - Continue prn oxycodone from other providers.   - Will call her with XR results and further plan.     Follow up if symptoms worsen or fail to improve.       ADDENDUM 10/13/20:   X-rays of right shoulder dated 10/13/20 and done on her way out of clinic are personally reviewed and show degeneration at AC joint. No fracture or dislocation.     Recommend MRI of right shoulder to evaluate for rotator cuff tear. No help with previous injection. Pain and ROM are worse since fall. Will call patient with results and further recommendations. Patient advised.     ADDENDUM 11/4/20:   MRI of right shoulder dated 11/3/20 is personally  reviewed and shows:   1. Supraspinatus and infraspinatus tendinopathy  2. Mild joint effusion  3.  Well-defined rounded lesion which has sclerotic borders as seen on shoulder radiograph performed 10/13/2020 near the insertion of the infraspinatus on the greater tuberosity which has a nonaggressive appearance.     Likely will offer her repeat injection and send her to PT, but will review MRI with Dr. Fuchs and let her know his recommendations.     ADDENDUM 11/5/20:   Above MRI reviewed with Dr. Fuchs. Lesion likely is calcific tendonitis. He agrees with above plan. Patient advised.

## 2020-10-13 ENCOUNTER — OFFICE VISIT (OUTPATIENT)
Dept: ORTHOPEDICS | Facility: CLINIC | Age: 55
End: 2020-10-13
Payer: MEDICARE

## 2020-10-13 ENCOUNTER — TELEPHONE (OUTPATIENT)
Dept: PODIATRY | Facility: CLINIC | Age: 55
End: 2020-10-13

## 2020-10-13 ENCOUNTER — TELEPHONE (OUTPATIENT)
Dept: FAMILY MEDICINE | Facility: CLINIC | Age: 55
End: 2020-10-13

## 2020-10-13 DIAGNOSIS — M25.511 RIGHT SHOULDER PAIN, UNSPECIFIED CHRONICITY: ICD-10-CM
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
## 2020-10-13 DIAGNOSIS — M25.531 RIGHT WRIST PAIN: ICD-10-CM

## 2020-10-13 PROCEDURE — 99999 PR PBB SHADOW E&M-EST. PATIENT-LVL III: ICD-10-PCS | Mod: PBBFAC,HCNC,, | Performed by: PHYSICIAN ASSISTANT

## 2020-10-13 PROCEDURE — 99214 OFFICE O/P EST MOD 30 MIN: CPT | Mod: HCNC,ICN,S$GLB, | Performed by: PHYSICIAN ASSISTANT

## 2020-10-13 PROCEDURE — 99214 PR OFFICE/OUTPT VISIT, EST, LEVL IV, 30-39 MIN: ICD-10-PCS | Mod: HCNC,ICN,S$GLB, | Performed by: PHYSICIAN ASSISTANT

## 2020-10-13 PROCEDURE — 99999 PR PBB SHADOW E&M-EST. PATIENT-LVL III: CPT | Mod: PBBFAC,HCNC,, | Performed by: PHYSICIAN ASSISTANT

## 2020-10-13 NOTE — TELEPHONE ENCOUNTER
----- Message from Leola Isidro PA-C sent at 10/13/2020 12:53 PM CDT -----  Please let her know that her right shoulder XRs show no fractures.     I want to get an MRI of her right shoulder. I put order in, please schedule it. We will call her with results and further recommendations.     Thanks.

## 2020-10-13 NOTE — TELEPHONE ENCOUNTER
Legs, ankle and feet have been having maria teresa horses constantly and the pain is somewhat unbearable. She stated she cannot take Tylenol or NSAIDs. Wants to know what she can do to help relieve the pain.

## 2020-10-13 NOTE — TELEPHONE ENCOUNTER
10/13/20, Spoke with Ms Voss, she is aware of her results and will have her MRI completed on Saturday.

## 2020-10-13 NOTE — TELEPHONE ENCOUNTER
----- Message from Coleen Carreno sent at 10/13/2020 10:58 AM CDT -----  Regarding: Returning Call  Contact: Self/ [phone]  Patient called in returning your call. Please advise.

## 2020-10-13 NOTE — TELEPHONE ENCOUNTER
----- Message from Carole Zabala LPN sent at 10/13/2020  9:59 AM CDT -----    ----- Message -----  From: Leola Isidro PA-C  Sent: 10/13/2020   9:52 AM CDT  To: Latisha Josefina Staff    She is having a lot of pain and muscles spasms in the calf of the left leg- Latisha is seeing her for left ankle/toe fractures. Can you please call her?     Thank you!    Leola

## 2020-10-15 ENCOUNTER — TELEPHONE (OUTPATIENT)
Dept: PODIATRY | Facility: CLINIC | Age: 55
End: 2020-10-15

## 2020-10-15 NOTE — TELEPHONE ENCOUNTER
Patient is having bad muscle spasm and charley horses while wearing the boot. Wants to know if we can put her in a cast.

## 2020-10-15 NOTE — TELEPHONE ENCOUNTER
----- Message from Chikis Rutherford sent at 10/15/2020  9:04 AM CDT -----  Contact: self/ [phone]  Would like to get medical advice.  Symptoms (please be specific):  maria teresa horses, muscle spasms. Left leg.. patient would like to know if she can have a cast on instead of the boot, please call and advise.   How long has patient had these symptoms:    Pharmacy name and phone # (copy from chart):    Comments:

## 2020-10-19 ENCOUNTER — TELEPHONE (OUTPATIENT)
Dept: FAMILY MEDICINE | Facility: CLINIC | Age: 55
End: 2020-10-19

## 2020-10-19 NOTE — TELEPHONE ENCOUNTER
Spoke to pt. She is going to the emergency room here at [facility]. She said she spoke to Dr. Lewis Friday and was advised to go to ER and didn't and she regrets it now. She is going for Depression and Body pain.

## 2020-10-19 NOTE — TELEPHONE ENCOUNTER
----- Message from Monse Quinones sent at 10/19/2020  7:33 AM CDT -----  Type:  Needs Medical Advice    Who Called: pt  Advice Regarding: depressed, pain all over body, wants to go to ER  Would the patient rather a call back or a response via [facility]? call  Best Call Back Number:   Additional Information: n/a

## 2020-10-19 NOTE — TELEPHONE ENCOUNTER
Spoke to pt. She is going to the emergency room here at [facility]. She said she spoke to Dr. Lewis Friday and was advised to go to ER and didn't and she regrets it now. She is going for Depression and Body pain.

## 2020-10-19 NOTE — TELEPHONE ENCOUNTER
----- Message from Aniceto Banks sent at 10/19/2020  8:48 AM CDT -----  Type:  Needs Medical Advice    Who Called: self  Reason:Requesting callback. She feels she needs to go to ER  Would the patient rather a call back or a response via [facility]? call  Best Call Back Number: [phone]  Additional Information: none

## 2020-10-20 PROBLEM — F32.A DEPRESSION WITH SUICIDAL IDEATION: Status: ACTIVE | Noted: 2020-10-20

## 2020-10-20 PROBLEM — R45.851 DEPRESSION WITH SUICIDAL IDEATION: Status: ACTIVE | Noted: 2020-10-20

## 2020-10-23 ENCOUNTER — TELEPHONE (OUTPATIENT)
Dept: ORTHOPEDICS | Facility: CLINIC | Age: 55
End: 2020-10-23

## 2020-10-23 DIAGNOSIS — F33.2 SEVERE EPISODE OF RECURRENT MAJOR DEPRESSIVE DISORDER, WITHOUT PSYCHOTIC FEATURES: Primary | ICD-10-CM

## 2020-10-23 NOTE — TELEPHONE ENCOUNTER
----- Message from Aniceto Banks sent at 10/23/2020 12:56 PM CDT -----  Type:  Needs Medical Advice    Who Called: self  Reason:MRI appointment   Would the patient rather a call back or a response via [facility]? Call  Best Call Back Number: [phone]  Additional Information:None

## 2020-10-23 NOTE — TELEPHONE ENCOUNTER
Patient wanted to r/s her MRI at [place] instead of [place], also wanted to let us know she just got out of a [facility], said she was depressed due to all of the pain she is in.

## 2020-11-05 ENCOUNTER — TELEPHONE (OUTPATIENT)
Dept: ORTHOPEDICS | Facility: CLINIC | Age: 55
End: 2020-11-05

## 2020-11-05 DIAGNOSIS — M25.511 ACUTE PAIN OF RIGHT SHOULDER: Primary | ICD-10-CM

## 2020-11-05 DIAGNOSIS — M75.31 CALCIFIC TENDONITIS OF RIGHT SHOULDER: ICD-10-CM

## 2020-11-05 NOTE — TELEPHONE ENCOUNTER
----- Message from Aniceto Banks sent at 11/5/2020 12:05 PM CST -----  Type:  Needs Medical Advice    Who Called: Self  Reason:Have you got her MRI results yet because she is in a lot of pain?  Would the patient rather a call back or a response via [facility]? call  Best Call Back Number: [phone]  Additional Information: none

## 2020-11-06 NOTE — TELEPHONE ENCOUNTER
----- Message from Radha Gregg LPN sent at 11/5/2020  2:53 PM CST -----  Results given, appt scheduled for her to come in for an injection, she does want PT  ----- Message -----  From: Leola Isidro PA-C  Sent: 11/5/2020  10:36 AM CST  To: Sanna Bhagat Staff    Please let her know that MRI show inflammation in the shoulder along with possible calcific tendonitis (calcium deposits in the tendon that body can absorb on its own). I reviewed MRI with Dr. Fuchs, he recommends repeat shoulder injection and PT. Let me know if I need to do PT orders.     Thanks.   ----- Message -----  From: Syed Fuchs Jr., MD  Sent: 11/4/2020  12:20 PM CST  To: Leola Isidro PA-C    Yes Could be calcific tendonitis which is painful  Agree with plan Thx  ----- Message -----  From: Leola Isidro PA-C  Sent: 11/4/2020  10:05 AM CST  To: Syed Fuchs Jr., MD    MRI of right shoulder dated 11/3/20 shows:   There is a well-defined rounded lesion which has sclerotic borders as seen on shoulder radiograph performed 10/13/2020 near the insertion of the infraspinatus on the greater tuberosity which has a nonaggressive appearance.    Also with supraspinatus and infraspinatus tendinopathy and mild joint effusion.    She has injection on 8/13/20 and it was helping until fall on 10/8/20. Now with worsening right shoulder pain and limited ROM.     Would that lesion be causing any pain/symptoms? I was going to offer her a repeat injection and send her to PT. That sound okay?     Thanks.

## 2020-11-10 ENCOUNTER — OFFICE VISIT (OUTPATIENT)
Dept: FAMILY MEDICINE | Facility: CLINIC | Age: 55
End: 2020-11-10
Payer: MEDICARE

## 2020-11-10 VITALS
OXYGEN SATURATION: 98 % | SYSTOLIC BLOOD PRESSURE: 134 MMHG | RESPIRATION RATE: 18 BRPM | TEMPERATURE: 97 F | HEART RATE: 76 BPM | WEIGHT: 293 LBS | BODY MASS INDEX: 41.95 KG/M2 | HEIGHT: 70 IN | DIASTOLIC BLOOD PRESSURE: 80 MMHG

## 2020-11-10 DIAGNOSIS — M25.561 CHRONIC PAIN OF RIGHT KNEE: ICD-10-CM

## 2020-11-10 DIAGNOSIS — G89.29 CHRONIC PAIN OF RIGHT KNEE: ICD-10-CM

## 2020-11-10 DIAGNOSIS — R42 DIZZINESS: Primary | ICD-10-CM

## 2020-11-10 PROCEDURE — 3008F PR BODY MASS INDEX (BMI) DOCUMENTED: ICD-10-PCS | Mod: CPTII,S$GLB,, | Performed by: INTERNAL MEDICINE

## 2020-11-10 PROCEDURE — 3075F PR MOST RECENT SYSTOLIC BLOOD PRESS GE 130-139MM HG: ICD-10-PCS | Mod: CPTII,S$GLB,, | Performed by: INTERNAL MEDICINE

## 2020-11-10 PROCEDURE — 3079F DIAST BP 80-89 MM HG: CPT | Mod: CPTII,S$GLB,, | Performed by: INTERNAL MEDICINE

## 2020-11-10 PROCEDURE — 99214 PR OFFICE/OUTPT VISIT, EST, LEVL IV, 30-39 MIN: ICD-10-PCS | Mod: S$GLB,,, | Performed by: INTERNAL MEDICINE

## 2020-11-10 PROCEDURE — 3075F SYST BP GE 130 - 139MM HG: CPT | Mod: CPTII,S$GLB,, | Performed by: INTERNAL MEDICINE

## 2020-11-10 PROCEDURE — 99999 PR PBB SHADOW E&M-EST. PATIENT-LVL IV: ICD-10-PCS | Mod: PBBFAC,,, | Performed by: INTERNAL MEDICINE

## 2020-11-10 PROCEDURE — 3079F PR MOST RECENT DIASTOLIC BLOOD PRESSURE 80-89 MM HG: ICD-10-PCS | Mod: CPTII,S$GLB,, | Performed by: INTERNAL MEDICINE

## 2020-11-10 PROCEDURE — 3008F BODY MASS INDEX DOCD: CPT | Mod: CPTII,S$GLB,, | Performed by: INTERNAL MEDICINE

## 2020-11-10 PROCEDURE — 99999 PR PBB SHADOW E&M-EST. PATIENT-LVL IV: CPT | Mod: PBBFAC,,, | Performed by: INTERNAL MEDICINE

## 2020-11-10 PROCEDURE — 99214 OFFICE O/P EST MOD 30 MIN: CPT | Mod: S$GLB,,, | Performed by: INTERNAL MEDICINE
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
## 2020-11-12 ENCOUNTER — PATIENT OUTREACH (OUTPATIENT)
Dept: ADMINISTRATIVE | Facility: OTHER | Age: 55
End: 2020-11-12

## 2020-11-12 NOTE — PROGRESS NOTES
Health Maintenance Due   Topic Date Due    Shingles Vaccine (1 of 2) 09/25/2014    Mammogram  03/06/2020     Updates were requested from care everywhere.  Chart was reviewed for overdue [facility]active [facility] Encounters ([facility]) topics (CRS, Breast Cancer Screening, Eye exam)  Health Maintenance has been updated.  LINKS immunization registry triggered.  Immunizations were reconciled.

## 2020-11-18 ENCOUNTER — TELEPHONE (OUTPATIENT)
Dept: PODIATRY | Facility: CLINIC | Age: 55
End: 2020-11-18

## 2020-11-18 DIAGNOSIS — I10 ESSENTIAL HYPERTENSION: Chronic | ICD-10-CM

## 2020-11-18 RX ORDER — CARVEDILOL 12.5 MG/1
12.5 TABLET ORAL 2 TIMES DAILY
Qty: 180 TABLET | Refills: 1 | Status: SHIPPED | OUTPATIENT
Start: 2020-11-18 | End: 2021-02-15 | Stop reason: SDUPTHER

## 2020-11-18 NOTE — TELEPHONE ENCOUNTER
Recommend be seen and okay attached my schedule for tomorrow.  Okay to use same day  Dr. Rosario Lewis D.O.   Family Medicine

## 2020-11-18 NOTE — TELEPHONE ENCOUNTER
----- Message from Aylin Manning MA sent at 11/18/2020  2:16 PM CST -----  Contact: patient  [phone]    ----- Message -----  From: Jose Cooper  Sent: 11/18/2020   2:03 PM CST  To: Joshua Ponce Staff    Patient requesting a prescription for water pills she states she's swelling in her stomach and legs   PATIENT REFUSE TO GO TO THE ER OR TALK TO THE NURSE ON CALL SHE WOULD ONLY LIKE TO SPEAK WITH DR JALLOH   Please advise

## 2020-11-18 NOTE — TELEPHONE ENCOUNTER
LVM for pt to call back to reschedule appt. Also stated that she can use the [facility] [employer] [facility] to reschedule the appointment if it is after work hours

## 2020-11-19 ENCOUNTER — OFFICE VISIT (OUTPATIENT)
Dept: FAMILY MEDICINE | Facility: CLINIC | Age: 55
End: 2020-11-19
Payer: MEDICARE

## 2020-11-19 VITALS
DIASTOLIC BLOOD PRESSURE: 72 MMHG | WEIGHT: 293 LBS | TEMPERATURE: 98 F | BODY MASS INDEX: 41.95 KG/M2 | OXYGEN SATURATION: 98 % | HEART RATE: 83 BPM | HEIGHT: 70 IN | SYSTOLIC BLOOD PRESSURE: 138 MMHG | RESPIRATION RATE: 18 BRPM

## 2020-11-19 DIAGNOSIS — I10 ESSENTIAL HYPERTENSION: Chronic | ICD-10-CM

## 2020-11-19 DIAGNOSIS — F41.1 GENERALIZED ANXIETY DISORDER: ICD-10-CM

## 2020-11-19 DIAGNOSIS — R60.0 LOWER EXTREMITY EDEMA: Primary | ICD-10-CM

## 2020-11-19 DIAGNOSIS — F33.1 MODERATE EPISODE OF RECURRENT MAJOR DEPRESSIVE DISORDER: ICD-10-CM

## 2020-11-19 PROBLEM — R45.851 DEPRESSION WITH SUICIDAL IDEATION: Status: RESOLVED | Noted: 2020-10-20 | Resolved: 2020-11-19

## 2020-11-19 PROBLEM — F32.A DEPRESSION WITH SUICIDAL IDEATION: Status: RESOLVED | Noted: 2020-10-20 | Resolved: 2020-11-19

## 2020-11-19 PROBLEM — R42 DIZZINESS: Status: RESOLVED | Noted: 2020-11-10 | Resolved: 2020-11-19

## 2020-11-19 PROCEDURE — 3075F SYST BP GE 130 - 139MM HG: CPT | Mod: HCNC,CPTII,S$GLB, | Performed by: FAMILY MEDICINE

## 2020-11-19 PROCEDURE — 3078F PR MOST RECENT DIASTOLIC BLOOD PRESSURE < 80 MM HG: ICD-10-PCS | Mod: HCNC,CPTII,S$GLB, | Performed by: FAMILY MEDICINE

## 2020-11-19 PROCEDURE — 3078F DIAST BP <80 MM HG: CPT | Mod: HCNC,CPTII,S$GLB, | Performed by: FAMILY MEDICINE

## 2020-11-19 PROCEDURE — 3075F PR MOST RECENT SYSTOLIC BLOOD PRESS GE 130-139MM HG: ICD-10-PCS | Mod: HCNC,CPTII,S$GLB, | Performed by: FAMILY MEDICINE

## 2020-11-19 PROCEDURE — 99214 PR OFFICE/OUTPT VISIT, EST, LEVL IV, 30-39 MIN: ICD-10-PCS | Mod: 25,HCNC,S$GLB, | Performed by: FAMILY MEDICINE

## 2020-11-19 PROCEDURE — G0008 ADMIN INFLUENZA VIRUS VAC: HCPCS | Mod: HCNC,S$GLB,, | Performed by: FAMILY MEDICINE

## 2020-11-19 PROCEDURE — 90686 IIV4 VACC NO PRSV 0.5 ML IM: CPT | Mod: HCNC,S$GLB,, | Performed by: FAMILY MEDICINE

## 2020-11-19 PROCEDURE — 90686 FLU VACCINE (QUAD) GREATER THAN OR EQUAL TO 3YO PRESERVATIVE FREE IM: ICD-10-PCS | Mod: HCNC,S$GLB,, | Performed by: FAMILY MEDICINE

## 2020-11-19 PROCEDURE — 1125F AMNT PAIN NOTED PAIN PRSNT: CPT | Mod: HCNC,S$GLB,, | Performed by: FAMILY MEDICINE

## 2020-11-19 PROCEDURE — 99999 PR PBB SHADOW E&M-EST. PATIENT-LVL IV: ICD-10-PCS | Mod: PBBFAC,HCNC,, | Performed by: FAMILY MEDICINE

## 2020-11-19 PROCEDURE — 1125F PR PAIN SEVERITY QUANTIFIED, PAIN PRESENT: ICD-10-PCS | Mod: HCNC,S$GLB,, | Performed by: FAMILY MEDICINE

## 2020-11-19 PROCEDURE — 99499 UNLISTED E&M SERVICE: CPT | Mod: HCNC,S$GLB,, | Performed by: FAMILY MEDICINE

## 2020-11-19 PROCEDURE — 3008F BODY MASS INDEX DOCD: CPT | Mod: HCNC,CPTII,S$GLB, | Performed by: FAMILY MEDICINE

## 2020-11-19 PROCEDURE — 99214 OFFICE O/P EST MOD 30 MIN: CPT | Mod: 25,HCNC,S$GLB, | Performed by: FAMILY MEDICINE

## 2020-11-19 PROCEDURE — 99999 PR PBB SHADOW E&M-EST. PATIENT-LVL IV: CPT | Mod: PBBFAC,HCNC,, | Performed by: FAMILY MEDICINE

## 2020-11-19 PROCEDURE — 99499 RISK ADDL DX/OHS AUDIT: ICD-10-PCS | Mod: S$GLB,,, | Performed by: FAMILY MEDICINE

## 2020-11-19 PROCEDURE — G0008 FLU VACCINE (QUAD) GREATER THAN OR EQUAL TO 3YO PRESERVATIVE FREE IM: ICD-10-PCS | Mod: HCNC,S$GLB,, | Performed by: FAMILY MEDICINE

## 2020-11-19 PROCEDURE — 3008F PR BODY MASS INDEX (BMI) DOCUMENTED: ICD-10-PCS | Mod: HCNC,CPTII,S$GLB, | Performed by: FAMILY MEDICINE

## 2020-11-19 RX ORDER — FUROSEMIDE 40 MG/1
40 TABLET ORAL DAILY PRN
Qty: 30 TABLET | Refills: 0 | Status: CANCELLED | OUTPATIENT
Start: 2020-11-19

## 2020-11-19 RX ORDER — FUROSEMIDE 20 MG/1
20 TABLET ORAL DAILY
Qty: 30 TABLET | Refills: 0 | Status: SHIPPED | OUTPATIENT
Start: 2020-11-19 | End: 2020-12-11

## 2020-11-19 NOTE — PROGRESS NOTES
Pt feels like the weight gain is caused by her 2 new medications, lexapro and Elavil. She was Rx these when she spent 3 days in the U in [facility] in October.    Nervous because when her   he was swollen and she is now scared.

## 2020-11-19 NOTE — PROGRESS NOTES
Patient, Danika Voss (MRN #[id number]), presented with a recent Platelet count less than 150 K/uL consistent with the definition of thrombocytopenia (ICD10 - D69.6).    Platelets   Date Value Ref Range Status   10/19/2020 119 (L) 150 - 350 K/uL Final     The patient's thrombocytopenia was monitored, evaluated, addressed and/or treated. This addendum to the medical record is made on 11/19/2020.
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
## 2020-12-07 NOTE — TELEPHONE ENCOUNTER
12/1 and 12/27 got gabapentin 600 mg 168 tablets patient is not due for refill until 1/22   Conjuntivae and eyelids appear normal

## 2020-12-10 ENCOUNTER — TELEPHONE (OUTPATIENT)
Dept: FAMILY MEDICINE | Facility: CLINIC | Age: 55
End: 2020-12-10

## 2020-12-10 NOTE — TELEPHONE ENCOUNTER
Spoke with patient her leg is red, swollen, leaking puss like liquid from her leg, in bad pain,cant walk on it....assured her the ER is safe and they take all precautions with all of there patients. And that with her leg being as bad as she is describing the ER is the best place for her to go so that she can get the proper treatment for it. She sated she was very appreciative that I called and spoke with her and she is going to the ER now. I also let her know what Dr. Lewis would be able to see everything the ER does.

## 2020-12-10 NOTE — TELEPHONE ENCOUNTER
----- Message from Shilpa Dey sent at 12/10/2020 11:04 AM CST -----  Type:  Needs Medical Advice    Who Called: Danika   Symptoms (please be specific): very infected left leg and swollen around ankle and leaking puss  How long has patient had these symptoms:  about 1 wk   Would the patient rather a call back or a response via [facility]?  Call back   Best Call Back Number: [phone]   Additional Information: Pt says she went to Urgent Care and they told her they could not do anything for and she would have to go to the ER and pt is afraid to go to the ER.  Pt is very upset, says she is in a lot of pain .

## 2020-12-11 ENCOUNTER — PATIENT MESSAGE (OUTPATIENT)
Dept: OTHER | Facility: OTHER | Age: 55
End: 2020-12-11

## 2020-12-28 ENCOUNTER — TELEPHONE (OUTPATIENT)
Dept: FAMILY MEDICINE | Facility: CLINIC | Age: 55
End: 2020-12-28

## 2020-12-28 NOTE — TELEPHONE ENCOUNTER
----- Message from Shilpa Dey sent at 12/28/2020  8:41 AM CST -----  Type:  Patient  Call    Who Called: Danika   [place] Call Back Number:[phone]  Additional Information: Pt is at the Dentist office and a copy of her Covid testing faxed to the Dentist so they can see her.   Fax #  [facility]   [phone]
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
## 2020-12-31 ENCOUNTER — OFFICE VISIT (OUTPATIENT)
Dept: FAMILY MEDICINE | Facility: CLINIC | Age: 55
End: 2020-12-31
Payer: MEDICARE

## 2020-12-31 VITALS
SYSTOLIC BLOOD PRESSURE: 122 MMHG | HEART RATE: 85 BPM | HEIGHT: 70 IN | DIASTOLIC BLOOD PRESSURE: 80 MMHG | WEIGHT: 293 LBS | BODY MASS INDEX: 41.95 KG/M2 | OXYGEN SATURATION: 97 % | RESPIRATION RATE: 18 BRPM | TEMPERATURE: 98 F

## 2020-12-31 DIAGNOSIS — I10 ESSENTIAL HYPERTENSION: Primary | Chronic | ICD-10-CM

## 2020-12-31 DIAGNOSIS — E03.4 HYPOTHYROIDISM DUE TO ACQUIRED ATROPHY OF THYROID: Chronic | ICD-10-CM

## 2020-12-31 DIAGNOSIS — B34.9 VIRAL ILLNESS: ICD-10-CM

## 2020-12-31 DIAGNOSIS — F33.42 RECURRENT MAJOR DEPRESSIVE DISORDER, IN FULL REMISSION: ICD-10-CM

## 2020-12-31 DIAGNOSIS — L97.921 ULCER OF LEFT LOWER EXTREMITY, LIMITED TO BREAKDOWN OF SKIN: ICD-10-CM

## 2020-12-31 DIAGNOSIS — L29.9 PRURITUS: ICD-10-CM

## 2020-12-31 DIAGNOSIS — R05.9 COUGH: ICD-10-CM

## 2020-12-31 DIAGNOSIS — F41.1 GENERALIZED ANXIETY DISORDER: ICD-10-CM

## 2020-12-31 PROCEDURE — 3008F PR BODY MASS INDEX (BMI) DOCUMENTED: ICD-10-PCS | Mod: HCNC,CPTII,S$GLB, | Performed by: FAMILY MEDICINE

## 2020-12-31 PROCEDURE — 99999 PR PBB SHADOW E&M-EST. PATIENT-LVL IV: CPT | Mod: PBBFAC,HCNC,, | Performed by: FAMILY MEDICINE

## 2020-12-31 PROCEDURE — 3079F PR MOST RECENT DIASTOLIC BLOOD PRESSURE 80-89 MM HG: ICD-10-PCS | Mod: HCNC,CPTII,S$GLB, | Performed by: FAMILY MEDICINE

## 2020-12-31 PROCEDURE — 99214 PR OFFICE/OUTPT VISIT, EST, LEVL IV, 30-39 MIN: ICD-10-PCS | Mod: HCNC,S$GLB,, | Performed by: FAMILY MEDICINE

## 2020-12-31 PROCEDURE — 3074F SYST BP LT 130 MM HG: CPT | Mod: HCNC,CPTII,S$GLB, | Performed by: FAMILY MEDICINE

## 2020-12-31 PROCEDURE — 99499 UNLISTED E&M SERVICE: CPT | Mod: S$GLB,,, | Performed by: FAMILY MEDICINE

## 2020-12-31 PROCEDURE — 99214 OFFICE O/P EST MOD 30 MIN: CPT | Mod: HCNC,S$GLB,, | Performed by: FAMILY MEDICINE

## 2020-12-31 PROCEDURE — 3008F BODY MASS INDEX DOCD: CPT | Mod: HCNC,CPTII,S$GLB, | Performed by: FAMILY MEDICINE

## 2020-12-31 PROCEDURE — 3079F DIAST BP 80-89 MM HG: CPT | Mod: HCNC,CPTII,S$GLB, | Performed by: FAMILY MEDICINE

## 2020-12-31 PROCEDURE — 99999 PR PBB SHADOW E&M-EST. PATIENT-LVL IV: ICD-10-PCS | Mod: PBBFAC,HCNC,, | Performed by: FAMILY MEDICINE

## 2020-12-31 PROCEDURE — 1125F PR PAIN SEVERITY QUANTIFIED, PAIN PRESENT: ICD-10-PCS | Mod: HCNC,S$GLB,, | Performed by: FAMILY MEDICINE

## 2020-12-31 PROCEDURE — U0003 INFECTIOUS AGENT DETECTION BY NUCLEIC ACID (DNA OR RNA); SEVERE ACUTE RESPIRATORY SYNDROME CORONAVIRUS 2 (SARS-COV-2) (CORONAVIRUS DISEASE [COVID-19]), AMPLIFIED PROBE TECHNIQUE, MAKING USE OF HIGH THROUGHPUT TECHNOLOGIES AS DESCRIBED BY CMS-2020-01-R: HCPCS | Mod: HCNC

## 2020-12-31 PROCEDURE — 1125F AMNT PAIN NOTED PAIN PRSNT: CPT | Mod: HCNC,S$GLB,, | Performed by: FAMILY MEDICINE

## 2020-12-31 PROCEDURE — 99499 RISK ADDL DX/OHS AUDIT: ICD-10-PCS | Mod: S$GLB,,, | Performed by: FAMILY MEDICINE

## 2020-12-31 PROCEDURE — 3074F PR MOST RECENT SYSTOLIC BLOOD PRESSURE < 130 MM HG: ICD-10-PCS | Mod: HCNC,CPTII,S$GLB, | Performed by: FAMILY MEDICINE

## 2020-12-31 RX ORDER — AMITRIPTYLINE HYDROCHLORIDE 50 MG/1
50 TABLET, FILM COATED ORAL NIGHTLY
Qty: 90 TABLET | Refills: 0 | Status: SHIPPED | OUTPATIENT
Start: 2020-12-31 | End: 2021-03-24

## 2020-12-31 RX ORDER — AMITRIPTYLINE HYDROCHLORIDE 50 MG/1
50 TABLET, FILM COATED ORAL NIGHTLY
Qty: 30 TABLET | Refills: 0 | Status: CANCELLED | OUTPATIENT
Start: 2020-12-31 | End: 2021-01-30

## 2020-12-31 RX ORDER — HYDROXYZINE HYDROCHLORIDE 25 MG/1
25 TABLET, FILM COATED ORAL 3 TIMES DAILY PRN
Qty: 90 TABLET | Refills: 5 | Status: SHIPPED | OUTPATIENT
Start: 2020-12-31

## 2020-12-31 NOTE — PATIENT INSTRUCTIONS
You can call central scheduling at [phone] or go [email or web address] to schedule an appointment for your Dermatology appointment

## 2021-01-01 LAB — SARS-COV-2 RNA RESP QL NAA+PROBE: NOT DETECTED

## 2021-01-04 ENCOUNTER — TELEPHONE (OUTPATIENT)
Dept: FAMILY MEDICINE | Facility: CLINIC | Age: 56
End: 2021-01-04

## 2021-01-04 ENCOUNTER — NURSE TRIAGE (OUTPATIENT)
Dept: ADMINISTRATIVE | Facility: CLINIC | Age: 56
End: 2021-01-04

## 2021-01-05 ENCOUNTER — TELEPHONE (OUTPATIENT)
Dept: FAMILY MEDICINE | Facility: CLINIC | Age: 56
End: 2021-01-05

## 2021-01-11 ENCOUNTER — TELEPHONE (OUTPATIENT)
Dept: HEPATOLOGY | Facility: CLINIC | Age: 56
End: 2021-01-11

## 2021-01-25 ENCOUNTER — PATIENT OUTREACH (OUTPATIENT)
Dept: ADMINISTRATIVE | Facility: OTHER | Age: 56
End: 2021-01-25

## 2021-01-25 ENCOUNTER — TELEPHONE (OUTPATIENT)
Dept: FAMILY MEDICINE | Facility: CLINIC | Age: 56
End: 2021-01-25

## 2021-01-27 ENCOUNTER — TELEPHONE (OUTPATIENT)
Dept: FAMILY MEDICINE | Facility: CLINIC | Age: 56
End: 2021-01-27

## 2021-01-27 ENCOUNTER — TELEPHONE (OUTPATIENT)
Dept: HEPATOLOGY | Facility: CLINIC | Age: 56
End: 2021-01-27

## 2021-02-15 DIAGNOSIS — I10 ESSENTIAL HYPERTENSION: Chronic | ICD-10-CM

## 2021-02-15 DIAGNOSIS — E03.4 ATROPHY OF THYROID (ACQUIRED): ICD-10-CM

## 2021-02-15 RX ORDER — GABAPENTIN 300 MG/1
600 CAPSULE ORAL 3 TIMES DAILY
Qty: 180 CAPSULE | Refills: 0 | Status: SHIPPED | OUTPATIENT
Start: 2021-02-15 | End: 2021-03-17

## 2021-02-15 RX ORDER — TRAMADOL HYDROCHLORIDE 50 MG/1
50 TABLET ORAL
Qty: 18 TABLET | Refills: 0 | Status: CANCELLED | OUTPATIENT
Start: 2021-02-15

## 2021-02-15 RX ORDER — LEVOTHYROXINE SODIUM 100 UG/1
100 TABLET ORAL DAILY
Qty: 90 TABLET | Refills: 3 | Status: SHIPPED | OUTPATIENT
Start: 2021-02-15

## 2021-02-15 RX ORDER — CARVEDILOL 12.5 MG/1
12.5 TABLET ORAL 2 TIMES DAILY
Qty: 180 TABLET | Refills: 1 | Status: SHIPPED | OUTPATIENT
Start: 2021-02-15 | End: 2021-08-10

## 2021-02-17 ENCOUNTER — TELEPHONE (OUTPATIENT)
Dept: FAMILY MEDICINE | Facility: CLINIC | Age: 56
End: 2021-02-17

## 2021-02-23 PROBLEM — B34.9 VIRAL ILLNESS: Status: RESOLVED | Noted: 2020-12-31 | Resolved: 2021-02-23

## 2021-02-23 PROBLEM — R05.9 COUGH: Status: RESOLVED | Noted: 2020-12-31 | Resolved: 2021-02-23

## 2021-12-16 ENCOUNTER — PATIENT MESSAGE (OUTPATIENT)
Dept: ADMINISTRATIVE | Facility: HOSPITAL | Age: 56
End: 2021-12-16
Payer: MEDICARE

## 2022-01-10 ENCOUNTER — PATIENT MESSAGE (OUTPATIENT)
Dept: ADMINISTRATIVE | Facility: HOSPITAL | Age: 57
End: 2022-01-10
Payer: MEDICARE

## 2022-05-31 ENCOUNTER — PATIENT MESSAGE (OUTPATIENT)
Dept: ADMINISTRATIVE | Facility: HOSPITAL | Age: 57
End: 2022-05-31
Payer: MEDICARE

## 2022-06-10 ENCOUNTER — PATIENT OUTREACH (OUTPATIENT)
Dept: ADMINISTRATIVE | Facility: HOSPITAL | Age: 57
End: 2022-06-10
Payer: MEDICARE

## 2022-06-10 DIAGNOSIS — Z12.31 ENCOUNTER FOR SCREENING MAMMOGRAM FOR MALIGNANT NEOPLASM OF BREAST: Primary | ICD-10-CM

## 2023-06-16 ENCOUNTER — PATIENT MESSAGE (OUTPATIENT)
Dept: PODIATRY | Facility: CLINIC | Age: 58
End: 2023-06-16
Payer: MEDICARE